# Patient Record
Sex: MALE | Race: WHITE | NOT HISPANIC OR LATINO | Employment: OTHER | URBAN - METROPOLITAN AREA
[De-identification: names, ages, dates, MRNs, and addresses within clinical notes are randomized per-mention and may not be internally consistent; named-entity substitution may affect disease eponyms.]

---

## 2017-01-17 ENCOUNTER — OFFICE VISIT (OUTPATIENT)
Dept: CARDIOLOGY | Facility: MEDICAL CENTER | Age: 67
End: 2017-01-17
Payer: COMMERCIAL

## 2017-01-17 VITALS
SYSTOLIC BLOOD PRESSURE: 120 MMHG | WEIGHT: 278 LBS | HEART RATE: 58 BPM | OXYGEN SATURATION: 96 % | HEIGHT: 73 IN | DIASTOLIC BLOOD PRESSURE: 68 MMHG | BODY MASS INDEX: 36.84 KG/M2

## 2017-01-17 DIAGNOSIS — I48.0 PAROXYSMAL ATRIAL FIBRILLATION (HCC): ICD-10-CM

## 2017-01-17 DIAGNOSIS — I11.9 BENIGN HYPERTENSIVE HEART DISEASE WITHOUT HEART FAILURE: ICD-10-CM

## 2017-01-17 DIAGNOSIS — Z79.01 CHRONIC ANTICOAGULATION: ICD-10-CM

## 2017-01-17 PROCEDURE — 99213 OFFICE O/P EST LOW 20 MIN: CPT | Performed by: INTERNAL MEDICINE

## 2017-01-17 ASSESSMENT — ENCOUNTER SYMPTOMS
WHEEZING: 0
PND: 0
BRUISES/BLEEDS EASILY: 0
COUGH: 0
MYALGIAS: 0
FALLS: 0
ORTHOPNEA: 0

## 2017-01-17 NOTE — MR AVS SNAPSHOT
"Warren Templeton   2017 8:40 AM   Office Visit   MRN: [id number]    Department:  [facility]   Dept Phone:  [phone]    Description:  Male : 1950   Provider:  Sonu Rodriguez M.D.           Allergies as of 2017     No Known Allergies      You were diagnosed with     Paroxysmal atrial fibrillation (CMS-[facility])   [493605]       Chronic anticoagulation   [610342]       Benign hypertensive heart disease without heart failure   [402.10.ICD-9-CM]         Vital Signs     Blood Pressure Pulse Height Weight Body Mass Index Oxygen Saturation    120/68 mmHg 58 1.854 m (6' 1\") 126.1 kg (278 lb) 36.69 kg/m2 96%    Smoking Status                   Never Smoker            Basic Information     Date Of Birth Sex Race Ethnicity Preferred Language    1950 Male White Non- English      Your appointments     Mar 29, 2017  2:40 PM   FOLLOW UP with Sonu Reddy M.D.   [facility] (--)    [address] [facility] [address]  [facility] [address] [phone]              Problem List              ICD-10-CM Priority Class Noted - Resolved    Benign hypertensive heart disease without heart failure I11.9   2007 - Present    Diastolic dysfunction I51.9   2012 - Present    Hypercholesterolemia E78.00   2007 - Present    MR (mitral regurgitation) I34.0   2012 - Present    Osteoarthritis M19.90   2012 - Present    RLS (restless legs syndrome) G25.81   2012 - Present    Other chest pain R07.89   2007 - Present    Atrial fibrillation (CMS-[facility]) I48.91   2016 - Present    Sleep apnea G47.30   2016 - Present    High risk medication use Z79.899   2016 - Present    Chronic anticoagulation Z79.01   2016 - Present      Health Maintenance        Date Due Completion Dates    IMM DTaP/Tdap/Td Vaccine (1 - Tdap) 3/1/1971 ---    COLONOSCOPY 3/1/2002 ---    IMM ZOSTER VACCINE 3/1/2012 ---    IMM INFLUENZA (1) 2016 ---         "   Current Immunizations     Pneumococcal Vaccine (UF)Historical Data 10/20/2015      Below and/or attached are the medications your provider expects you to take. Review all of your home medications and newly ordered medications with your provider and/or pharmacist. Follow medication instructions as directed by your provider and/or pharmacist. Please keep your medication list with you and share with your provider. Update the information when medications are discontinued, doses are changed, or new medications (including over-the-counter products) are added; and carry medication information at all times in the event of emergency situations     Allergies:  No Known Allergies          Medications  Valid as of: January 17, 2017 -  9:11 AM    Generic Name Brand Name Tablet Size Instructions for use    AmLODIPine Besylate (Tab) NORVASC 10 MG Take 10 mg by mouth every day.        Amoxicillin (Cap) AMOXIL 500 MG Take 2,000 mg by mouth Once. Prior to dental work.  Per pt, did not end up getting dental work, has to wait.        Apixaban (Tab) ELIQUIS 5mg Take 1 Tab by mouth 2 Times a Day.        Ascorbic Acid (Tab) ascorbic acid 500 MG Take 500 mg by mouth 2 Times a Day.        Baclofen (Tab) LIORESAL 10 MG Take 10 mg by mouth 4 times a day as needed (pain).        Calcium Citrate   Take 2 Tabs by mouth 2 Times a Day.        Cyanocobalamin (Tab) VITAMIN B-12 500 MCG Take 500 mcg by mouth every day.        Metoprolol Succinate (TABLET SR 24 HR) TOPROL  MG Take 1 Tab by mouth every day.        Naproxen (Tab) NAPROSYN 500 MG Take 500 mg by mouth 2 times a day, with meals.        Omega-3 Fatty Acids (Cap) fish oil 1000 MG Take 1,000 mg by mouth 2 Times a Day.        Omeprazole (CAPSULE DELAYED RELEASE) PRILOSEC 20 MG Take 20 mg by mouth 2 times a day as needed (with naproxen or prn).        Prenatal Vit-Fe Fumarate-FA   Take 1 tablet by mouth 2 Times a Day.        TraMADol HCl (Tab) ULTRAM 50 MG Take  mg by mouth every  four hours as needed for Severe Pain.        Valsartan (Tab) DIOVAN 160 MG Take 160 mg by mouth 2 times a day.        .                 Medicines prescribed today were sent to:     [facility]/PH[facility]Y #[id number] [place], [place] - [address] [address]    Phone: [phone] Fax: [phone]    Open 24 Hours?: No      Medication refill instructions:       If your prescription bottle indicates you have medication refills left, it is not necessary to call your provider’s office. Please contact your pharmacy and they will refill your medication.    If your prescription bottle indicates you do not have any refills left, you may request refills at any time through one of the following ways: The online [employer] system (except Urgent Care), by calling your provider’s office, or by asking your pharmacy to contact your provider’s office with a refill request. Medication refills are processed only during regular business hours and may not be available until the next business day. Your provider may request additional information or to have a follow-up visit with you prior to refilling your medication.   *Please Note: Medication refills are assigned a new Rx number when refilled electronically. Your pharmacy may indicate that no refills were authorized even though a new prescription for the same medication is available at the pharmacy. Please request the medicine by name with the pharmacy before contacting your provider for a refill.        Your To Do List     Future Labs/Procedures Complete By Expires    HOLTER MONITOR STUDY  2/1/2017 1/17/2018         [employer] Status: Patient Declined

## 2017-01-17 NOTE — Clinical Note
[facility]   [address]  [place] [address]  Phone: [phone]  Fax: [phone]              Warren Templeton  1950    Encounter Date: 1/17/2017    Sonu Rodriguez M.D.          PROGRESS NOTE:  Subjective:   Warren Templeton is a 66 y.o. male who presents today for follow-up of his hypertensive cardiovascular disease and arrhythmia. He's been doing very well since his last visit with Dr. Reddy. He is due to set up the Holter.    Past Medical History   Diagnosis Date   • Benign hypertensive heart disease without heart failure      Disabling from his career as a , occupation related.    • Pure hypercholesterolemia      [facility]   • [facility] pain, unspecified      [facility]   • Diastolic dysfunction    • MR (mitral regurgitation)    • Osteoarthritis    • RLS (restless legs syndrome)      Past Surgical History   Procedure Laterality Date   • Other orthopedic surgery  2007 note;     Bilateral, with musculoskelatal limitations   • Gastric bypass laparoscopic       With Daron-en-Y     Family History   Problem Relation Age of Onset   • Hypertension Mother    • Cancer Father      Esophageal   • Cancer Brother      Brain   • Hypertension Brother      History   Smoking status   • Never Smoker    Smokeless tobacco   • Never Used     No Known Allergies  Outpatient Encounter Prescriptions as of 1/17/2017   Medication Sig Dispense Refill   • metoprolol SR (TOPROL XL) 100 MG TABLET SR 24 HR Take 1 Tab by mouth every day. 30 Tab 5   • baclofen (LIORESAL) 10 MG Tab Take 10 mg by mouth 4 times a day as needed (pain).     • amoxicillin (AMOXIL) 500 MG Cap Take 2,000 mg by mouth Once. Prior to dental work.  Per pt, did not end up getting dental work, has to wait.     • apixaban (ELIQUIS) 5mg Tab Take 1 Tab by mouth 2 Times a Day. 42 Tab 0   • Prenatal Vit-Fe Fumarate-FA (RE PRENATAL MULTIVITAMIN/IRON  "PO) Take 1 tablet by mouth 2 Times a Day.     • Omega-3 Fatty Acids (FISH OIL) 1000 MG Cap capsule Take 1,000 mg by mouth 2 Times a Day.     • cyanocobalamin (VITAMIN B-12) 500 MCG TABS Take 500 mcg by mouth every day.     • omeprazole (PRILOSEC) 20 MG CPDR Take 20 mg by mouth 2 times a day as needed (with naproxen or prn).     • naproxen (NAPROSYN) 500 MG TABS Take 500 mg by mouth 2 times a day, with meals.     • tramadol (ULTRAM) 50 MG TABS Take  mg by mouth every four hours as needed for Severe Pain.     • ascorbic acid (ASCORBIC ACID) 500 MG TABS Take 500 mg by mouth 2 Times a Day.     • Calcium Citrate (CITRACAL PO) Take 2 Tabs by mouth 2 Times a Day.     • valsartan (DIOVAN) 160 MG TABS Take 160 mg by mouth 2 times a day.     • amlodipine (NORVASC) 10 MG TABS Take 10 mg by mouth every day.       No facility-administered encounter medications on file as of 1/17/2017.     Review of Systems   HENT: Negative for nosebleeds.    Respiratory: Negative for cough and wheezing.    Cardiovascular: Negative for orthopnea and PND.   Musculoskeletal: Negative for myalgias and falls.   Endo/Heme/Allergies: Does not bruise/bleed easily.        Objective:   /68 mmHg  Pulse 58  Ht 1.854 m (6' 1\")  Wt 126.1 kg (278 lb)  BMI 36.69 kg/m2  SpO2 96%    Physical Exam   Constitutional: He is oriented to person, place, and time. He appears well-developed and well-nourished.   Eyes: No scleral icterus.   Neck: No JVD present.   Cardiovascular: Normal rate, regular rhythm, normal heart sounds and intact distal pulses.  Exam reveals no gallop.    No murmur heard.  Pulmonary/Chest: Effort normal and breath sounds normal.   Musculoskeletal: He exhibits no edema.   Neurological: He is alert and oriented to person, place, and time.   Psychiatric: He has a normal mood and affect. Thought content normal.       Assessment:     1. Paroxysmal atrial fibrillation (CMS-[facility])  HOLTER MONITOR STUDY   2. Chronic anticoagulation     3. " Benign hypertensive heart disease without heart failure       The above assessed cardiovascular problems are clinically stable.  Medical Decision Making:  Today's Assessment / Status / Plan:     Obtain the Holter before his next visit with Dr. Reddy. Follow with me after that visit. [facility] and [facility]'ll [facility] us copies.  Continued primary follow-up with Dr. Argueta.      No Recipients

## 2017-01-17 NOTE — PROGRESS NOTES
Subjective:   Warren Templeton is a 66 y.o. male who presents today for follow-up of his hypertensive cardiovascular disease and arrhythmia. He's been doing very well since his last visit with Dr. Reddy. He is due to set up the Holter.    Past Medical History   Diagnosis Date   • Benign hypertensive heart disease without heart failure      Disabling from his career as a , occupation related.    • Pure hypercholesterolemia      [facility]   • Chest pain, unspecified      ~2000 with negative cardiac cath by [facility]   • Diastolic dysfunction    • MR (mitral regurgitation)    • Osteoarthritis    • RLS (restless legs syndrome)      Past Surgical History   Procedure Laterality Date   • Other orthopedic surgery  2007 note;     Bilateral, with musculoskelatal limitations   • Gastric bypass laparoscopic       With Daron-en-Y     Family History   Problem Relation Age of Onset   • Hypertension Mother    • Cancer Father      Esophageal   • Cancer Brother      Brain   • Hypertension Brother      History   Smoking status   • Never Smoker    Smokeless tobacco   • Never Used     No Known Allergies  Outpatient Encounter Prescriptions as of 1/17/2017   Medication Sig Dispense Refill   • metoprolol SR (TOPROL XL) 100 MG TABLET SR 24 HR Take 1 Tab by mouth every day. 30 Tab 5   • baclofen (LIORESAL) 10 MG Tab Take 10 mg by mouth 4 times a day as needed (pain).     • amoxicillin (AMOXIL) 500 MG Cap Take 2,000 mg by mouth Once. Prior to dental work.  Per pt, did not end up getting dental work, has to wait.     • apixaban (ELIQUIS) 5mg Tab Take 1 Tab by mouth 2 Times a Day. 42 Tab 0   • Prenatal Vit-Fe Fumarate-FA (RE PRENATAL MULTIVITAMIN/IRON PO) Take 1 tablet by mouth 2 Times a Day.     • Omega-3 Fatty Acids (FISH OIL) 1000 MG Cap capsule Take 1,000 mg by mouth 2 Times a Day.     • cyanocobalamin (VITAMIN B-12) 500 MCG TABS Take 500 mcg by mouth every day.     • omeprazole (PRILOSEC) 20 MG CPDR Take 20 mg by mouth 2  "times a day as needed (with naproxen or prn).     • naproxen (NAPROSYN) 500 MG TABS Take 500 mg by mouth 2 times a day, with meals.     • tramadol (ULTRAM) 50 MG TABS Take  mg by mouth every four hours as needed for Severe Pain.     • ascorbic acid (ASCORBIC ACID) 500 MG TABS Take 500 mg by mouth 2 Times a Day.     • Calcium Citrate (CITRACAL PO) Take 2 Tabs by mouth 2 Times a Day.     • valsartan (DIOVAN) 160 MG TABS Take 160 mg by mouth 2 times a day.     • amlodipine (NORVASC) 10 MG TABS Take 10 mg by mouth every day.       No facility-administered encounter medications on file as of 1/17/2017.     Review of Systems   HENT: Negative for nosebleeds.    Respiratory: Negative for cough and wheezing.    Cardiovascular: Negative for orthopnea and PND.   Musculoskeletal: Negative for myalgias and falls.   Endo/Heme/Allergies: Does not bruise/bleed easily.        Objective:   /68 mmHg  Pulse 58  Ht 1.854 m (6' 1\")  Wt 126.1 kg (278 lb)  BMI 36.69 kg/m2  SpO2 96%    Physical Exam   Constitutional: He is oriented to person, place, and time. He appears well-developed and well-nourished.   Eyes: No scleral icterus.   Neck: No JVD present.   Cardiovascular: Normal rate, regular rhythm, normal heart sounds and intact distal pulses.  Exam reveals no gallop.    No murmur heard.  Pulmonary/Chest: Effort normal and breath sounds normal.   Musculoskeletal: He exhibits no edema.   Neurological: He is alert and oriented to person, place, and time.   Psychiatric: He has a normal mood and affect. Thought content normal.       Assessment:     1. Paroxysmal atrial fibrillation (CMS-[facility])  HOLTER MONITOR STUDY   2. Chronic anticoagulation     3. Benign hypertensive heart disease without heart failure       The above assessed cardiovascular problems are clinically stable.  Medical Decision Making:  Today's Assessment / Status / Plan:     Obtain the Holter before his next visit with Dr. Reddy. Follow with me after that " visit. [facility]'ll [facility] us copies.  Continued primary follow-up with Dr. Argueta.

## 2017-02-27 DIAGNOSIS — I48.0 PAF (PAROXYSMAL ATRIAL FIBRILLATION) (HCC): ICD-10-CM

## 2017-02-28 DIAGNOSIS — I48.0 PAROXYSMAL ATRIAL FIBRILLATION (HCC): ICD-10-CM

## 2017-02-28 RX ORDER — APIXABAN 5 MG/1
TABLET, FILM COATED ORAL
Qty: 60 TAB | Refills: 11 | Status: SHIPPED | OUTPATIENT
Start: 2017-02-28 | End: 2017-03-28

## 2017-03-28 DIAGNOSIS — I10 ESSENTIAL HYPERTENSION: ICD-10-CM

## 2017-03-28 DIAGNOSIS — I48.0 PAF (PAROXYSMAL ATRIAL FIBRILLATION) (HCC): ICD-10-CM

## 2017-03-28 RX ORDER — METOPROLOL SUCCINATE 100 MG/1
100 TABLET, EXTENDED RELEASE ORAL DAILY
Qty: 30 TAB | Refills: 11 | Status: CANCELLED | OUTPATIENT
Start: 2017-03-28

## 2017-03-29 ENCOUNTER — OFFICE VISIT (OUTPATIENT)
Dept: CARDIOLOGY | Facility: MEDICAL CENTER | Age: 67
End: 2017-03-29
Payer: COMMERCIAL

## 2017-03-29 VITALS
BODY MASS INDEX: 36.18 KG/M2 | HEART RATE: 63 BPM | WEIGHT: 273 LBS | DIASTOLIC BLOOD PRESSURE: 70 MMHG | OXYGEN SATURATION: 94 % | HEIGHT: 73 IN | SYSTOLIC BLOOD PRESSURE: 124 MMHG

## 2017-03-29 DIAGNOSIS — I48.0 PAF (PAROXYSMAL ATRIAL FIBRILLATION) (HCC): ICD-10-CM

## 2017-03-29 LAB — EKG IMPRESSION: NORMAL

## 2017-03-29 PROCEDURE — 99214 OFFICE O/P EST MOD 30 MIN: CPT | Performed by: INTERNAL MEDICINE

## 2017-03-29 PROCEDURE — 93000 ELECTROCARDIOGRAM COMPLETE: CPT | Performed by: INTERNAL MEDICINE

## 2017-03-29 RX ORDER — POTASSIUM CHLORIDE 750 MG/1
TABLET, EXTENDED RELEASE ORAL
Refills: 11 | COMMUNITY
Start: 2017-03-01 | End: 2017-12-27 | Stop reason: SDUPTHER

## 2017-03-29 RX ORDER — PREDNISONE 20 MG/1
TABLET ORAL
Refills: 0 | COMMUNITY
Start: 2017-03-13 | End: 2017-12-27

## 2017-03-29 RX ORDER — PREDNISONE 10 MG/1
TABLET ORAL
Refills: 0 | COMMUNITY
Start: 2017-03-02 | End: 2017-12-27

## 2017-03-29 RX ORDER — DOFETILIDE 0.5 MG/1
500 CAPSULE ORAL EVERY 12 HOURS
Refills: 3 | COMMUNITY
Start: 2017-01-09 | End: 2017-12-12

## 2017-03-29 ASSESSMENT — ENCOUNTER SYMPTOMS: PALPITATIONS: 0

## 2017-03-29 NOTE — PROGRESS NOTES
Subjective:   Warren Templeton is a 67 y.o. male who presents today for follow up of a fib on rate control    He stopped potassium but now getting leg cramps.  He is not feeling any a fib.  Feeling pretty good.  Cleaning the lot next to his.  Dealing with poison oak but other than that he is ok    Past Medical History   Diagnosis Date   • Benign hypertensive heart disease without heart failure      Disabling from his career as a , occupation related.    • Pure hypercholesterolemia      [facility]   • Chest pain, unspecified      ~[facility]   • Diastolic dysfunction    • MR (mitral regurgitation)    • Osteoarthritis    • RLS (restless legs syndrome)      Past Surgical History   Procedure Laterality Date   • Other orthopedic surgery  2007 note;     Bilateral, with musculoskelatal limitations   • Gastric bypass laparoscopic       With Daron-en-Y     Family History   Problem Relation Age of Onset   • Hypertension Mother    • Cancer Father      Esophageal   • Cancer Brother      Brain   • Hypertension Brother      History   Smoking status   • Never Smoker    Smokeless tobacco   • Never Used     No Known Allergies  Outpatient Encounter Prescriptions as of 3/29/2017   Medication Sig Dispense Refill   • dofetilide (TIKOSYN) 500 MCG Cap Take 500 mcg by mouth every 12 hours.  3   • apixaban (ELIQUIS) 5mg Tab Take 1 Tab by mouth 2 Times a Day. 60 Tab 11   • [DISCONTINUED] metoprolol SR (TOPROL XL) 100 MG TABLET SR 24 HR Take 1 Tab by mouth every day. 30 Tab 5   • baclofen (LIORESAL) 10 MG Tab Take 10 mg by mouth 4 times a day as needed (pain).     • amoxicillin (AMOXIL) 500 MG Cap Take 2,000 mg by mouth Once. Prior to dental work.  Per pt, did not end up getting dental work, has to wait.     • Prenatal Vit-Fe Fumarate-FA (RE PRENATAL MULTIVITAMIN/IRON PO) Take 1 tablet by mouth 2 Times a Day.     • Omega-3 Fatty Acids (FISH OIL) 1000 MG Cap capsule Take 1,000 mg by mouth 2  "Times a Day.     • cyanocobalamin (VITAMIN B-12) 500 MCG TABS Take 500 mcg by mouth every day.     • omeprazole (PRILOSEC) 20 MG CPDR Take 20 mg by mouth 2 times a day as needed (with naproxen or prn).     • naproxen (NAPROSYN) 500 MG TABS Take 500 mg by mouth 2 times a day, with meals.     • tramadol (ULTRAM) 50 MG TABS Take  mg by mouth every four hours as needed for Severe Pain.     • ascorbic acid (ASCORBIC ACID) 500 MG TABS Take 500 mg by mouth 2 Times a Day.     • Calcium Citrate (CITRACAL PO) Take 2 Tabs by mouth 2 Times a Day.     • valsartan (DIOVAN) 160 MG TABS Take 160 mg by mouth 2 times a day.     • amlodipine (NORVASC) 10 MG TABS Take 10 mg by mouth every day.     • potassium chloride SA (K-DUR) 10 MEQ Tab CR TAKE 1 TABLET BY MOUTH TWICE A DAY AS DIRECTED  11   • predniSONE (DELTASONE) 20 MG Tab TAKE 2 TABS DAILY X3 DAYS THEN 1 TAB X3 DAYS  0   • predniSONE (DELTASONE) 10 MG Tab TAKE 5 TABLETS DAILY X2 DAYS, THEN DECREASE BY 1 TABLET EVERY 2 DAYS AS DIRECTED (CHECK INS)  0     No facility-administered encounter medications on file as of 3/29/2017.     Review of Systems   Cardiovascular: Negative for palpitations.        Objective:   /70 mmHg  Pulse 63  Ht 1.854 m (6' 0.99\")  Wt 123.832 kg (273 lb)  BMI 36.03 kg/m2  SpO2 94%    Physical Exam   Constitutional: He is oriented to person, place, and time. He appears well-developed and well-nourished. No distress.   HENT:   Head: Normocephalic and atraumatic.   Right Ear: External ear normal.   Left Ear: External ear normal.   Nose: Nose normal.   Mouth/Throat: Oropharynx is clear and moist.   Eyes: Conjunctivae and EOM are normal. Pupils are equal, round, and reactive to light. Right eye exhibits no discharge. Left eye exhibits no discharge. No scleral icterus.   Neck: Normal range of motion. Neck supple. No JVD present. No tracheal deviation present.   Cardiovascular: Normal rate, regular rhythm, normal heart sounds and intact distal " pulses.  Exam reveals no gallop and no friction rub.    No murmur heard.  Pulmonary/Chest: Effort normal and breath sounds normal. No stridor. No respiratory distress. He has no wheezes. He has no rales. He exhibits no tenderness.   Abdominal: Soft. He exhibits no distension. There is no tenderness.   Musculoskeletal: He exhibits no edema or tenderness.   Neurological: He is alert and oriented to person, place, and time. No cranial nerve deficit. Coordination normal.   Skin: Skin is warm and dry. No rash noted. He is not diaphoretic. No erythema. No pallor.   Psychiatric: He has a normal mood and affect. His behavior is normal. Judgment and thought content normal.   Vitals reviewed.      Assessment:     1. PAF (paroxysmal atrial fibrillation) (CMS-[facility])  EKG       Medical Decision Making:  Today's Assessment / Status / Plan:     Doing very well on tikosyn and oac.  Cr 1.1 and qtc 414

## 2017-03-29 NOTE — MR AVS SNAPSHOT
"        Warren Castrohilario   3/29/2017 2:40 PM   Office Visit   MRN: [id number]    Department:  [facility]   Dept Phone:  [phone]    Description:  Male : 1950   Provider:  Sonu Reddy M.D.           Reason for Visit     Follow-Up           Allergies as of 3/29/2017     No Known Allergies      You were diagnosed with     PAF (paroxysmal atrial fibrillation) (CMS-HCC)   [033271]         Vital Signs     Blood Pressure Pulse Height Weight Body Mass Index Oxygen Saturation    124/70 mmHg 63 1.854 m (6' 0.99\") 123.832 kg (273 lb) 36.03 kg/m2 94%    Smoking Status                   Never Smoker            Basic Information     Date Of Birth Sex Race Ethnicity Preferred Language    1950 Male White Non- English      Problem List              ICD-10-CM Priority Class Noted - Resolved    Benign hypertensive heart disease without heart failure I11.9   2007 - Present    Diastolic dysfunction I51.9   2012 - Present    Hypercholesterolemia E78.00   2007 - Present    MR (mitral regurgitation) I34.0   2012 - Present    Osteoarthritis M19.90   2012 - Present    RLS (restless legs syndrome) G25.81   2012 - Present    Other chest pain R07.89   2007 - Present    Sleep apnea G47.30   2016 - Present    High risk medication use Z79.899   2016 - Present    Chronic anticoagulation Z79.01   2016 - Present    Paroxysmal atrial fibrillation (CMS-HCC) I48.0   2017 - Present      Health Maintenance        Date Due Completion Dates    IMM DTaP/Tdap/Td Vaccine (1 - Tdap) 3/1/1971 ---    COLONOSCOPY 3/1/2002 ---    IMM ZOSTER VACCINE 3/1/2012 ---    IMM INFLUENZA (1) 2016 ---    IMM PNEUMOCOCCAL 65+ (ADULT) LOW/MEDIUM RISK SERIES (1 of 2 - PCV13) 3/1/2017 ---            Results       Current Immunizations     Pneumococcal Vaccine (UF)Historical Data 10/20/2015      Below and/or attached are the medications your provider expects you to take. Review all of your home " medications and newly ordered medications with your provider and/or pharmacist. Follow medication instructions as directed by your provider and/or pharmacist. Please keep your medication list with you and share with your provider. Update the information when medications are discontinued, doses are changed, or new medications (including over-the-counter products) are added; and carry medication information at all times in the event of emergency situations     Allergies:  No Known Allergies          Medications  Valid as of: March 29, 2017 -  3:15 PM    Generic Name Brand Name Tablet Size Instructions for use    AmLODIPine Besylate (Tab) NORVASC 10 MG Take 10 mg by mouth every day.        Amoxicillin (Cap) AMOXIL 500 MG Take 2,000 mg by mouth Once. Prior to dental work.  Per pt, did not end up getting dental work, has to wait.        Apixaban (Tab) ELIQUIS 5mg Take 1 Tab by mouth 2 Times a Day.        Ascorbic Acid (Tab) ascorbic acid 500 MG Take 500 mg by mouth 2 Times a Day.        Baclofen (Tab) LIORESAL 10 MG Take 10 mg by mouth 4 times a day as needed (pain).        Calcium Citrate   Take 2 Tabs by mouth 2 Times a Day.        Cyanocobalamin (Tab) VITAMIN B-12 500 MCG Take 500 mcg by mouth every day.        Dofetilide (Cap) TIKOSYN 500 MCG Take 500 mcg by mouth every 12 hours.        Metoprolol Succinate (TABLET SR 24 HR) TOPROL  MG Take 1 Tab by mouth every day.        Naproxen (Tab) NAPROSYN 500 MG Take 500 mg by mouth 2 times a day, with meals.        Omega-3 Fatty Acids (Cap) fish oil 1000 MG Take 1,000 mg by mouth 2 Times a Day.        Omeprazole (CAPSULE DELAYED RELEASE) PRILOSEC 20 MG Take 20 mg by mouth 2 times a day as needed (with naproxen or prn).        Potassium Chloride Stephanie CR (Tab CR) K-DUR 10 MEQ TAKE 1 TABLET BY MOUTH TWICE A DAY AS DIRECTED        PredniSONE (Tab) DELTASONE 20 MG TAKE 2 TABS DAILY X3 DAYS THEN 1 TAB X3 DAYS        PredniSONE (Tab) DELTASONE 10 MG TAKE 5 TABLETS DAILY X2  DAYS, THEN DECREASE BY 1 TABLET EVERY 2 DAYS AS DIRECTED (CHECK INS)        Prenatal Vit-Fe Fumarate-FA   Take 1 tablet by mouth 2 Times a Day.        TraMADol HCl (Tab) ULTRAM 50 MG Take  mg by mouth every four hours as needed for Severe Pain.        Valsartan (Tab) DIOVAN 160 MG Take 160 mg by mouth 2 times a day.        .                 Medicines prescribed today were sent to:     [id number] [place], [place] - [address] [address]    Phone: [phone] Fax: [phone]    Open 24 Hours?: No      Medication refill instructions:       If your prescription bottle indicates you have medication refills left, it is not necessary to call your provider’s office. Please contact your pharmacy and they will refill your medication.    If your prescription bottle indicates you do not have any refills left, you may request refills at any time through one of the following ways: The online [employer] system (except Urgent Care), by calling your provider’s office, or by asking your pharmacy to contact your provider’s office with a refill request. Medication refills are processed only during regular business hours and may not be available until the next business day. Your provider may request additional information or to have a follow-up visit with you prior to refilling your medication.   *Please Note: Medication refills are assigned a new Rx number when refilled electronically. Your pharmacy may indicate that no refills were authorized even though a new prescription for the same medication is available at the pharmacy. Please request the medicine by name with the pharmacy before contacting your provider for a refill.           MyChart Status: Patient Declined

## 2017-04-02 DIAGNOSIS — I10 ESSENTIAL HYPERTENSION: ICD-10-CM

## 2017-04-02 RX ORDER — METOPROLOL SUCCINATE 100 MG/1
100 TABLET, EXTENDED RELEASE ORAL DAILY
Qty: 30 TAB | Refills: 11 | Status: ON HOLD | OUTPATIENT
Start: 2017-04-02 | End: 2018-01-17

## 2017-10-05 ENCOUNTER — TELEPHONE (OUTPATIENT)
Dept: CARDIOLOGY | Facility: MEDICAL CENTER | Age: 67
End: 2017-10-05

## 2017-10-05 NOTE — TELEPHONE ENCOUNTER
----- Message from Cate Coates sent at 10/5/2017  2:10 PM PDT -----  Regarding: Question about donating blood  JOSE/Ludy    Patient wants to find out if it's okay for him to donate blood and can be reached at [phone].

## 2017-12-11 ENCOUNTER — TELEPHONE (OUTPATIENT)
Dept: CARDIOLOGY | Facility: MEDICAL CENTER | Age: 67
End: 2017-12-11

## 2017-12-11 NOTE — TELEPHONE ENCOUNTER
Lives in [place]. Much more fatigue x 2 weeks. HR is irregular, doesn't know rate. Taking Eliquis as prescribed. Pt. has [facility] w/ Dr. Rodriguez 12/18. Can't come sooner. Wife having surgery this week in [place]. On metoprolol. Tikosyn on med list. Pt states no, Dr. Reddy stopped it at last visit. Right now, he'll monitor HR. Triggers discussed. If he has sustained fast HR & he becomes lightheaded, near syncopal, etc., he'll use ER. Will keep [facility] next Mon. with Dr. Rodriguez. Forwarded to Dr. Rodriguez & Dr. Reddy

## 2017-12-11 NOTE — TELEPHONE ENCOUNTER
----- Message from Breana Ward, Med Ass't sent at 12/11/2017 11:54 AM PST -----  Regarding: Symptoms  Contact: [phone]  JOSE Elkins,    Pt says he has been feeling fatigued. He says he thinks he may be possibly in Afib.    He would like a call back at: [phone].    Thank you so much,    M

## 2017-12-12 NOTE — TELEPHONE ENCOUNTER
I called & offered him 12/27 - only day when both MD's in before Dr. Rodriguez retires. Pt wants to just keep [facility] w/ Dr. Rodriguez & arrange [facility] w/ Dr. Reddy after seen. He confirmed with his daughter who was at appt. that he was to stop Tikosyn. I updated MAR.

## 2017-12-12 NOTE — TELEPHONE ENCOUNTER
I did not know he was off Tikosyn.  I don't know if we can just start it again without repeating the wash in phase with hospital stay.  I am happy to see him and will ask Dr. Reddy.

## 2017-12-12 NOTE — TELEPHONE ENCOUNTER
I am off on 12/18.  Is there another day that week when we could both see him.  I did not think we stopped the tikosyn

## 2017-12-18 ENCOUNTER — OFFICE VISIT (OUTPATIENT)
Dept: CARDIOLOGY | Facility: MEDICAL CENTER | Age: 67
End: 2017-12-18
Payer: MEDICARE

## 2017-12-18 VITALS
WEIGHT: 285 LBS | BODY MASS INDEX: 37.77 KG/M2 | OXYGEN SATURATION: 94 % | HEIGHT: 73 IN | DIASTOLIC BLOOD PRESSURE: 80 MMHG | HEART RATE: 62 BPM | SYSTOLIC BLOOD PRESSURE: 130 MMHG

## 2017-12-18 DIAGNOSIS — Z79.899 HIGH RISK MEDICATION USE: ICD-10-CM

## 2017-12-18 DIAGNOSIS — E78.00 HYPERCHOLESTEROLEMIA: ICD-10-CM

## 2017-12-18 DIAGNOSIS — Z79.01 CHRONIC ANTICOAGULATION: ICD-10-CM

## 2017-12-18 DIAGNOSIS — I48.0 PAROXYSMAL ATRIAL FIBRILLATION (HCC): ICD-10-CM

## 2017-12-18 DIAGNOSIS — I11.9 BENIGN HYPERTENSIVE HEART DISEASE WITHOUT HEART FAILURE: ICD-10-CM

## 2017-12-18 PROCEDURE — 99213 OFFICE O/P EST LOW 20 MIN: CPT | Performed by: INTERNAL MEDICINE

## 2017-12-18 ASSESSMENT — ENCOUNTER SYMPTOMS
FALLS: 0
COUGH: 0
PND: 0
BRUISES/BLEEDS EASILY: 0
ORTHOPNEA: 0
WHEEZING: 0
MYALGIAS: 0

## 2017-12-18 ASSESSMENT — LIFESTYLE VARIABLES: SUBSTANCE_ABUSE: 0

## 2017-12-18 NOTE — LETTER
[facility]   [address]   [address]  [place] [address]  Phone: [phone]  Fax: [phone]              Warren Templeton  1950    Encounter Date: 12/18/2017    Sonu Rodriguez M.D.          PROGRESS NOTE:  Subjective:   Warren Templeton is a 67 y.o. male who presents today for follow-up of atrial fibrillation.  He's noticed an irregular rhythm for several weeks with increased easy fatigue. There was some confusion about his last visit in that Dr. Reddy thought he was to stay on the dofetilide but Mr. Templeton and his daughter were confident that he was supposed to come off and in fact he has been off it since that visit.  He's had no other cardiac symptoms.  Past Medical History:   Diagnosis Date   • Benign hypertensive heart disease without heart failure     Disabling from his career as a , occupation related.    • Chest pain, unspecified     ~[facility]   • Chronic anticoagulation 11/11/2016   • Diastolic dysfunction    • MR (mitral regurgitation)    • Osteoarthritis    • Paroxysmal atrial fibrillation (CMS-HCC) 2/28/2017   • Pure hypercholesterolemia     [facility]   • RLS (restless legs syndrome)      Past Surgical History:   Procedure Laterality Date   • GASTRIC BYPASS LAPAROSCOPIC      With Daron-en-Y   • OTHER ORTHOPEDIC SURGERY  2007 note;    Bilateral, with musculoskelatal limitations     Family History   Problem Relation Age of Onset   • Hypertension Mother    • Cancer Father      Esophageal   • Cancer Brother      Brain   • Hypertension Brother      History   Smoking Status   • Never Smoker   Smokeless Tobacco   • Never Used     No Known Allergies  Outpatient Encounter Prescriptions as of 12/18/2017   Medication Sig Dispense Refill   • metoprolol SR (TOPROL XL) 100 MG TABLET SR 24 HR Take 1 Tab by mouth every day. 30 Tab 11   • potassium chloride SA (K-DUR) 10 MEQ Tab CR  "TAKE 1 TABLET BY MOUTH TWICE A DAY AS DIRECTED  11   • apixaban (ELIQUIS) 5mg Tab Take 1 Tab by mouth 2 Times a Day. 60 Tab 11   • baclofen (LIORESAL) 10 MG Tab Take 10 mg by mouth 4 times a day as needed (pain).     • Prenatal Vit-Fe Fumarate-FA (RE PRENATAL MULTIVITAMIN/IRON PO) Take 1 tablet by mouth 2 Times a Day.     • Omega-3 Fatty Acids (FISH OIL) 1000 MG Cap capsule Take 1,000 mg by mouth 2 Times a Day.     • cyanocobalamin (VITAMIN B-12) 500 MCG TABS Take 500 mcg by mouth every day.     • omeprazole (PRILOSEC) 20 MG CPDR Take 20 mg by mouth 2 times a day as needed (with naproxen or prn).     • naproxen (NAPROSYN) 500 MG TABS Take 500 mg by mouth 2 times a day, with meals.     • tramadol (ULTRAM) 50 MG TABS Take  mg by mouth every four hours as needed for Severe Pain.     • ascorbic acid (ASCORBIC ACID) 500 MG TABS Take 500 mg by mouth 2 Times a Day.     • Calcium Citrate (CITRACAL PO) Take 2 Tabs by mouth 2 Times a Day.     • valsartan (DIOVAN) 160 MG TABS Take 160 mg by mouth 2 times a day.     • amlodipine (NORVASC) 10 MG TABS Take 10 mg by mouth every day.     • predniSONE (DELTASONE) 20 MG Tab TAKE 2 TABS DAILY X3 DAYS THEN 1 TAB X3 DAYS  0   • predniSONE (DELTASONE) 10 MG Tab TAKE 5 TABLETS DAILY X2 DAYS, THEN DECREASE BY 1 TABLET EVERY 2 DAYS AS DIRECTED (CHECK INS)  0   • amoxicillin (AMOXIL) 500 MG Cap Take 2,000 mg by mouth Once. Prior to dental work.  Per pt, did not end up getting dental work, has to wait.       No facility-administered encounter medications on file as of 12/18/2017.      Review of Systems   HENT: Negative for nosebleeds.    Respiratory: Negative for cough and wheezing.    Cardiovascular: Negative for orthopnea and PND.   Musculoskeletal: Negative for falls and myalgias.   Endo/Heme/Allergies: Does not bruise/bleed easily.   Psychiatric/Behavioral: Negative for substance abuse.        Objective:   /80   Pulse 62   Ht 1.854 m (6' 1\")   Wt (!) 129.3 kg (285 lb)   " SpO2 94%   BMI 37.60 kg/m²      Physical Exam   Constitutional: He is oriented to person, place, and time. He appears well-developed and well-nourished.   Eyes: No scleral icterus.   Neck: No JVD present.   Cardiovascular: Normal rate, S2 normal, normal heart sounds and intact distal pulses.  An irregularly irregular rhythm present. Exam reveals no gallop.    No murmur heard.  Normal variation of 1st sound   Pulmonary/Chest: Effort normal and breath sounds normal.   Musculoskeletal: He exhibits no edema.   Neurological: He is alert and oriented to person, place, and time.   Psychiatric: He has a normal mood and affect. Thought content normal.       Assessment:     1. Paroxysmal atrial fibrillation (CMS-HCC)  [facility] EPIPHANY EKG (Clinic Performed)    TSH   2. Chronic anticoagulation  CBC WITH DIFFERENTIAL   3. Benign hypertensive heart disease without heart failure  COMP METABOLIC PANEL   4. Hypercholesterolemia  LIPID PROFILE    COMP METABOLIC PANEL    TSH   5. High risk medication use  MAGNESIUM     EKG confirms that he is back in atrial fibrillation.  Heart rate and blood pressure well controlled.  Medical Decision Making:  Today's Assessment / Status / Plan:     Follow-up with Dr. Reddy and obtain lab prior to that visit. Decision then regarding reinduction of dofetilide or continuation with current anticoagulation and medical therapy strategy. The argument against that is that he is more fatigued in atrial fibrillation.      Sonu Argueta M.D.  [address] [place] [address]  VIA Facsimile: [phone]     Sonu Reddy M.D.  [address]  [address]  [facility] [address]  VIA In Basket

## 2017-12-19 NOTE — PROGRESS NOTES
Subjective:   Warren Templeton is a 67 y.o. male who presents today for follow-up of atrial fibrillation.  He's noticed an irregular rhythm for several weeks with increased easy fatigue. There was some confusion about his last visit in that Dr. Reddy thought he was to stay on the dofetilide but Mr. Templeton and his daughter were confident that he was supposed to come off and in fact he has been off it since that visit.  He's had no other cardiac symptoms.  Past Medical History:   Diagnosis Date   • Benign hypertensive heart disease without heart failure     Disabling from his career as a , occupation related.    • Chest pain, unspecified     ~2000 with negative cardiac cath by [facility]   • Chronic anticoagulation 11/11/2016   • Diastolic dysfunction    • MR (mitral regurgitation)    • Osteoarthritis    • Paroxysmal atrial fibrillation (CMS-HCC) 2/28/2017   • Pure hypercholesterolemia     [facility]   • RLS (restless legs syndrome)      Past Surgical History:   Procedure Laterality Date   • GASTRIC BYPASS LAPAROSCOPIC      With Daron-en-Y   • OTHER ORTHOPEDIC SURGERY  2007 note;    Bilateral, with musculoskelatal limitations     Family History   Problem Relation Age of Onset   • Hypertension Mother    • Cancer Father      Esophageal   • Cancer Brother      Brain   • Hypertension Brother      History   Smoking Status   • Never Smoker   Smokeless Tobacco   • Never Used     No Known Allergies  Outpatient Encounter Prescriptions as of 12/18/2017   Medication Sig Dispense Refill   • metoprolol SR (TOPROL XL) 100 MG TABLET SR 24 HR Take 1 Tab by mouth every day. 30 Tab 11   • potassium chloride SA (K-DUR) 10 MEQ Tab CR TAKE 1 TABLET BY MOUTH TWICE A DAY AS DIRECTED  11   • apixaban (ELIQUIS) 5mg Tab Take 1 Tab by mouth 2 Times a Day. 60 Tab 11   • baclofen (LIORESAL) 10 MG Tab Take 10 mg by mouth 4 times a day as needed (pain).     • Prenatal Vit-Fe Fumarate-FA (RE PRENATAL MULTIVITAMIN/IRON PO) Take 1  "tablet by mouth 2 Times a Day.     • Omega-3 Fatty Acids (FISH OIL) 1000 MG Cap capsule Take 1,000 mg by mouth 2 Times a Day.     • cyanocobalamin (VITAMIN B-12) 500 MCG TABS Take 500 mcg by mouth every day.     • omeprazole (PRILOSEC) 20 MG CPDR Take 20 mg by mouth 2 times a day as needed (with naproxen or prn).     • naproxen (NAPROSYN) 500 MG TABS Take 500 mg by mouth 2 times a day, with meals.     • tramadol (ULTRAM) 50 MG TABS Take  mg by mouth every four hours as needed for Severe Pain.     • ascorbic acid (ASCORBIC ACID) 500 MG TABS Take 500 mg by mouth 2 Times a Day.     • Calcium Citrate (CITRACAL PO) Take 2 Tabs by mouth 2 Times a Day.     • valsartan (DIOVAN) 160 MG TABS Take 160 mg by mouth 2 times a day.     • amlodipine (NORVASC) 10 MG TABS Take 10 mg by mouth every day.     • predniSONE (DELTASONE) 20 MG Tab TAKE 2 TABS DAILY X3 DAYS THEN 1 TAB X3 DAYS  0   • predniSONE (DELTASONE) 10 MG Tab TAKE 5 TABLETS DAILY X2 DAYS, THEN DECREASE BY 1 TABLET EVERY 2 DAYS AS DIRECTED (CHECK INS)  0   • amoxicillin (AMOXIL) 500 MG Cap Take 2,000 mg by mouth Once. Prior to dental work.  Per pt, did not end up getting dental work, has to wait.       No facility-administered encounter medications on file as of 12/18/2017.      Review of Systems   HENT: Negative for nosebleeds.    Respiratory: Negative for cough and wheezing.    Cardiovascular: Negative for orthopnea and PND.   Musculoskeletal: Negative for falls and myalgias.   Endo/Heme/Allergies: Does not bruise/bleed easily.   Psychiatric/Behavioral: Negative for substance abuse.        Objective:   /80   Pulse 62   Ht 1.854 m (6' 1\")   Wt (!) 129.3 kg (285 lb)   SpO2 94%   BMI 37.60 kg/m²     Physical Exam   Constitutional: He is oriented to person, place, and time. He appears well-developed and well-nourished.   Eyes: No scleral icterus.   Neck: No JVD present.   Cardiovascular: Normal rate, S2 normal, normal heart sounds and intact distal " pulses.  An irregularly irregular rhythm present. Exam reveals no gallop.    No murmur heard.  Normal variation of 1st sound   Pulmonary/Chest: Effort normal and breath sounds normal.   Musculoskeletal: He exhibits no edema.   Neurological: He is alert and oriented to person, place, and time.   Psychiatric: He has a normal mood and affect. Thought content normal.       Assessment:     1. Paroxysmal atrial fibrillation (CMS-HCC)  [facility] EPIPHANY EKG (Clinic Performed)    TSH   2. Chronic anticoagulation  CBC WITH DIFFERENTIAL   3. Benign hypertensive heart disease without heart failure  COMP METABOLIC PANEL   4. Hypercholesterolemia  LIPID PROFILE    COMP METABOLIC PANEL    TSH   5. High risk medication use  MAGNESIUM     EKG confirms that he is back in atrial fibrillation.  Heart rate and blood pressure well controlled.  Medical Decision Making:  Today's Assessment / Status / Plan:     Follow-up with Dr. Reddy and obtain lab prior to that visit. Decision then regarding reinduction of dofetilide or continuation with current anticoagulation and medical therapy strategy. The argument against that is that he is more fatigued in atrial fibrillation.

## 2017-12-20 NOTE — TELEPHONE ENCOUNTER
Spoke with pt, can move appt up from end of Jan if likes or can wait per DAVID, will need to be seen to discuss readmission for tikosyn. appt made 12/27 with NEIL HERNANDEZ to [facility]

## 2017-12-20 NOTE — TELEPHONE ENCOUNTER
I reviewed the previous notes from my visits.  Last decmeber, he noticed no difference in sinus from what he felt in a fib so it was stopped.  If nothing else is different and now notes that the a fib is symptomatic, would readmit to restart the tikosyn.

## 2017-12-27 ENCOUNTER — OFFICE VISIT (OUTPATIENT)
Dept: CARDIOLOGY | Facility: MEDICAL CENTER | Age: 67
End: 2017-12-27
Payer: COMMERCIAL

## 2017-12-27 ENCOUNTER — TELEPHONE (OUTPATIENT)
Dept: CARDIOLOGY | Facility: MEDICAL CENTER | Age: 67
End: 2017-12-27

## 2017-12-27 VITALS
OXYGEN SATURATION: 92 % | DIASTOLIC BLOOD PRESSURE: 84 MMHG | WEIGHT: 286 LBS | SYSTOLIC BLOOD PRESSURE: 124 MMHG | BODY MASS INDEX: 37.91 KG/M2 | HEIGHT: 73 IN | HEART RATE: 70 BPM

## 2017-12-27 DIAGNOSIS — I48.19 PERSISTENT ATRIAL FIBRILLATION (HCC): ICD-10-CM

## 2017-12-27 LAB — EKG IMPRESSION: NORMAL

## 2017-12-27 PROCEDURE — 93000 ELECTROCARDIOGRAM COMPLETE: CPT | Performed by: INTERNAL MEDICINE

## 2017-12-27 PROCEDURE — 99214 OFFICE O/P EST MOD 30 MIN: CPT | Performed by: INTERNAL MEDICINE

## 2017-12-27 RX ORDER — POTASSIUM CHLORIDE 750 MG/1
20 TABLET, EXTENDED RELEASE ORAL 2 TIMES DAILY
Qty: 90 TAB | Refills: 11 | Status: SHIPPED | OUTPATIENT
Start: 2017-12-27 | End: 2017-12-29 | Stop reason: SDUPTHER

## 2017-12-27 RX ORDER — FUROSEMIDE 20 MG/1
20 TABLET ORAL DAILY
Qty: 30 TAB | Refills: 0 | Status: ON HOLD | OUTPATIENT
Start: 2017-12-27 | End: 2018-01-17

## 2017-12-27 ASSESSMENT — ENCOUNTER SYMPTOMS
PALPITATIONS: 0
SHORTNESS OF BREATH: 1

## 2017-12-27 NOTE — PROGRESS NOTES
Subjective:   Warren Templeton is a 67 y.o. male who presents today for follow up of a fib.    About a month ago got a cold.  Energy was zapped.  Afterward found out he was back in a fib.  Was feeling good till then.      Past Medical History:   Diagnosis Date   • Benign hypertensive heart disease without heart failure     Disabling from his career as a , occupation related.    • Chest pain, unspecified     ~2000 with negative cardiac cath by [facility]   • Chronic anticoagulation 11/11/2016   • Diastolic dysfunction    • MR (mitral regurgitation)    • Osteoarthritis    • Paroxysmal atrial fibrillation (CMS-HCC) 2/28/2017   • Pure hypercholesterolemia     [facility]   • RLS (restless legs syndrome)      Past Surgical History:   Procedure Laterality Date   • GASTRIC BYPASS LAPAROSCOPIC      With Daron-en-Y   • OTHER ORTHOPEDIC SURGERY  2007 note;    Bilateral, with musculoskelatal limitations     Family History   Problem Relation Age of Onset   • Hypertension Mother    • Cancer Father      Esophageal   • Cancer Brother      Brain   • Hypertension Brother      History   Smoking Status   • Never Smoker   Smokeless Tobacco   • Never Used     No Known Allergies  Outpatient Encounter Prescriptions as of 12/27/2017   Medication Sig Dispense Refill   • metoprolol SR (TOPROL XL) 100 MG TABLET SR 24 HR Take 1 Tab by mouth every day. 30 Tab 11   • potassium chloride SA (K-DUR) 10 MEQ Tab CR TAKE 1 TABLET BY MOUTH TWICE A DAY AS DIRECTED  11   • apixaban (ELIQUIS) 5mg Tab Take 1 Tab by mouth 2 Times a Day. 60 Tab 11   • baclofen (LIORESAL) 10 MG Tab Take 10 mg by mouth 4 times a day as needed (pain).     • amoxicillin (AMOXIL) 500 MG Cap Take 2,000 mg by mouth Once. Prior to dental work.  Per pt, did not end up getting dental work, has to wait.     • Prenatal Vit-Fe Fumarate-FA (RE PRENATAL MULTIVITAMIN/IRON PO) Take 1 tablet by mouth 2 Times a Day.     • Omega-3 Fatty Acids (FISH OIL) 1000 MG Cap capsule Take  "1,000 mg by mouth 2 Times a Day.     • cyanocobalamin (VITAMIN B-12) 500 MCG TABS Take 500 mcg by mouth every day.     • omeprazole (PRILOSEC) 20 MG CPDR Take 20 mg by mouth 2 times a day as needed (with naproxen or prn).     • naproxen (NAPROSYN) 500 MG TABS Take 500 mg by mouth 2 times a day, with meals.     • tramadol (ULTRAM) 50 MG TABS Take  mg by mouth every four hours as needed for Severe Pain.     • ascorbic acid (ASCORBIC ACID) 500 MG TABS Take 500 mg by mouth 2 Times a Day.     • Calcium Citrate (CITRACAL PO) Take 2 Tabs by mouth 2 Times a Day.     • valsartan (DIOVAN) 160 MG TABS Take 160 mg by mouth 2 times a day.     • amlodipine (NORVASC) 10 MG TABS Take 10 mg by mouth every day.     • predniSONE (DELTASONE) 20 MG Tab TAKE 2 TABS DAILY X3 DAYS THEN 1 TAB X3 DAYS  0   • predniSONE (DELTASONE) 10 MG Tab TAKE 5 TABLETS DAILY X2 DAYS, THEN DECREASE BY 1 TABLET EVERY 2 DAYS AS DIRECTED (CHECK INS)  0     No facility-administered encounter medications on file as of 12/27/2017.      Review of Systems   Constitutional: Positive for malaise/fatigue.   Respiratory: Positive for shortness of breath.    Cardiovascular: Positive for leg swelling. Negative for palpitations.        Objective:   /84   Pulse 70   Ht 1.854 m (6' 1\")   Wt (!) 129.7 kg (286 lb)   SpO2 92%   BMI 37.73 kg/m²     Physical Exam   Constitutional: He is oriented to person, place, and time. He appears well-developed and well-nourished. No distress.   HENT:   Head: Normocephalic and atraumatic.   Right Ear: External ear normal.   Left Ear: External ear normal.   Nose: Nose normal.   Mouth/Throat: Oropharynx is clear and moist.   Eyes: Conjunctivae and EOM are normal. Pupils are equal, round, and reactive to light. Right eye exhibits no discharge. Left eye exhibits no discharge. No scleral icterus.   Neck: Normal range of motion. Neck supple. No JVD present. No tracheal deviation present.   Cardiovascular: Normal rate, normal " heart sounds and intact distal pulses.  An irregularly irregular rhythm present. Exam reveals no gallop and no friction rub.    No murmur heard.  Pulmonary/Chest: Effort normal and breath sounds normal. No stridor. No respiratory distress. He has no wheezes. He has no rales. He exhibits no tenderness.   Abdominal: Soft. He exhibits no distension. There is no tenderness.   Musculoskeletal: He exhibits edema. He exhibits no tenderness.   Neurological: He is alert and oriented to person, place, and time. No cranial nerve deficit. Coordination normal.   Skin: Skin is warm and dry. No rash noted. He is not diaphoretic. No erythema. No pallor.   Psychiatric: He has a normal mood and affect. His behavior is normal. Judgment and thought content normal.   Vitals reviewed.      Assessment:     1. Persistent atrial fibrillation (CMS-HCC)         Medical Decision Making:  Today's Assessment / Status / Plan:   He has symptoms.  Would like to get back on tikosyn.  Was well tolerated before.  With edema and being a bit winded, will try low dose lasix with some k

## 2017-12-28 ENCOUNTER — TELEPHONE (OUTPATIENT)
Dept: CARDIOLOGY | Facility: MEDICAL CENTER | Age: 67
End: 2017-12-28

## 2017-12-28 NOTE — TELEPHONE ENCOUNTER
Advised pt to take lasix and potassium until symptoms resolve or improve, and to call and let us know.

## 2017-12-28 NOTE — TELEPHONE ENCOUNTER
----- Message from Jackie Crespo sent at 12/28/2017  2:25 PM PST -----  Regarding: lasix clarification  Contact: [phone]  DAVID/payal  Pt calling for clarification of furosemide (LASIX) 20 MG instructions, needs to know how long he must take it.  Please call pt at [phone]

## 2017-12-29 ENCOUNTER — TELEPHONE (OUTPATIENT)
Dept: CARDIOLOGY | Facility: MEDICAL CENTER | Age: 67
End: 2017-12-29

## 2017-12-29 DIAGNOSIS — R60.0 LOCALIZED EDEMA: ICD-10-CM

## 2017-12-29 RX ORDER — POTASSIUM CHLORIDE 750 MG/1
20 TABLET, EXTENDED RELEASE ORAL 2 TIMES DAILY
Qty: 120 TAB | Refills: 11 | Status: SHIPPED | OUTPATIENT
Start: 2017-12-29 | End: 2018-05-09 | Stop reason: SDUPTHER

## 2017-12-30 NOTE — TELEPHONE ENCOUNTER
----- Message from Shanita Mcfarlane sent at 12/29/2017  4:02 PM PST -----  Regarding: pharmacy wants to increase quantity of potassium prescription  Contact: [phone]  DAVID/Lisbeth Motta @ [facility] is calling about the potassium prescription. He said the script is for 90 days, and since the patient is supposed to take 4 tabs daily, can they change the script to 120. He can be reached at [phone].

## 2018-01-16 ENCOUNTER — HOSPITAL ENCOUNTER (INPATIENT)
Facility: MEDICAL CENTER | Age: 68
LOS: 3 days | DRG: 310 | End: 2018-01-19
Attending: INTERNAL MEDICINE | Admitting: INTERNAL MEDICINE
Payer: COMMERCIAL

## 2018-01-16 PROCEDURE — 770020 HCHG ROOM/CARE - TELE (206)

## 2018-01-16 NOTE — PROGRESS NOTES
Patient is a direct admit from home.   Dr Reddy is admitting the patient.   Written orders faxed to floor and scanned into the media tab in the chart.   ADT signed and held, needs to be released when the patient arrives on the unit.   Patient will be arriving by private vehicle.

## 2018-01-17 ENCOUNTER — APPOINTMENT (OUTPATIENT)
Dept: RADIOLOGY | Facility: MEDICAL CENTER | Age: 68
DRG: 310 | End: 2018-01-17
Attending: NURSE PRACTITIONER
Payer: COMMERCIAL

## 2018-01-17 ENCOUNTER — HOSPITAL ENCOUNTER (OUTPATIENT)
Facility: MEDICAL CENTER | Age: 68
DRG: 310 | End: 2018-01-17
Payer: COMMERCIAL

## 2018-01-17 PROBLEM — I48.91 ATRIAL FIBRILLATION (HCC): Status: ACTIVE | Noted: 2018-01-17

## 2018-01-17 LAB
ALBUMIN SERPL BCP-MCNC: 4.3 G/DL (ref 3.2–4.9)
ALBUMIN/GLOB SERPL: 1.7 G/DL
ALP SERPL-CCNC: 47 U/L (ref 30–99)
ALT SERPL-CCNC: 17 U/L (ref 2–50)
ANION GAP SERPL CALC-SCNC: 8 MMOL/L (ref 0–11.9)
APTT PPP: 34.2 SEC (ref 24.7–36)
AST SERPL-CCNC: 22 U/L (ref 12–45)
BILIRUB SERPL-MCNC: 0.5 MG/DL (ref 0.1–1.5)
BUN SERPL-MCNC: 17 MG/DL (ref 8–22)
CALCIUM SERPL-MCNC: 9.5 MG/DL (ref 8.5–10.5)
CHLORIDE SERPL-SCNC: 104 MMOL/L (ref 96–112)
CO2 SERPL-SCNC: 25 MMOL/L (ref 20–33)
CREAT SERPL-MCNC: 1.05 MG/DL (ref 0.5–1.4)
EKG IMPRESSION: NORMAL
EKG IMPRESSION: NORMAL
ERYTHROCYTE [DISTWIDTH] IN BLOOD BY AUTOMATED COUNT: 45.3 FL (ref 35.9–50)
GLOBULIN SER CALC-MCNC: 2.5 G/DL (ref 1.9–3.5)
GLUCOSE SERPL-MCNC: 100 MG/DL (ref 65–99)
HCT VFR BLD AUTO: 40.1 % (ref 42–52)
HGB BLD-MCNC: 13.1 G/DL (ref 14–18)
INR PPP: 1.09 (ref 0.87–1.13)
MAGNESIUM SERPL-MCNC: 1.9 MG/DL (ref 1.5–2.5)
MCH RBC QN AUTO: 28.7 PG (ref 27–33)
MCHC RBC AUTO-ENTMCNC: 32.7 G/DL (ref 33.7–35.3)
MCV RBC AUTO: 87.9 FL (ref 81.4–97.8)
PLATELET # BLD AUTO: 222 K/UL (ref 164–446)
PMV BLD AUTO: 10.3 FL (ref 9–12.9)
POTASSIUM SERPL-SCNC: 4.4 MMOL/L (ref 3.6–5.5)
PROT SERPL-MCNC: 6.8 G/DL (ref 6–8.2)
PROTHROMBIN TIME: 13.8 SEC (ref 12–14.6)
RBC # BLD AUTO: 4.56 M/UL (ref 4.7–6.1)
SODIUM SERPL-SCNC: 137 MMOL/L (ref 135–145)
TSH SERPL DL<=0.005 MIU/L-ACNC: 2.41 UIU/ML (ref 0.38–5.33)
WBC # BLD AUTO: 7.5 K/UL (ref 4.8–10.8)

## 2018-01-17 PROCEDURE — 90662 IIV NO PRSV INCREASED AG IM: CPT | Performed by: INTERNAL MEDICINE

## 2018-01-17 PROCEDURE — 71045 X-RAY EXAM CHEST 1 VIEW: CPT

## 2018-01-17 PROCEDURE — 83735 ASSAY OF MAGNESIUM: CPT

## 2018-01-17 PROCEDURE — A9270 NON-COVERED ITEM OR SERVICE: HCPCS | Performed by: INTERNAL MEDICINE

## 2018-01-17 PROCEDURE — 700102 HCHG RX REV CODE 250 W/ 637 OVERRIDE(OP): Performed by: NURSE PRACTITIONER

## 2018-01-17 PROCEDURE — 80053 COMPREHEN METABOLIC PANEL: CPT

## 2018-01-17 PROCEDURE — 85610 PROTHROMBIN TIME: CPT

## 2018-01-17 PROCEDURE — 770020 HCHG ROOM/CARE - TELE (206)

## 2018-01-17 PROCEDURE — 93010 ELECTROCARDIOGRAM REPORT: CPT | Mod: 76 | Performed by: INTERNAL MEDICINE

## 2018-01-17 PROCEDURE — 700111 HCHG RX REV CODE 636 W/ 250 OVERRIDE (IP): Performed by: INTERNAL MEDICINE

## 2018-01-17 PROCEDURE — 85730 THROMBOPLASTIN TIME PARTIAL: CPT

## 2018-01-17 PROCEDURE — 90471 IMMUNIZATION ADMIN: CPT

## 2018-01-17 PROCEDURE — 36415 COLL VENOUS BLD VENIPUNCTURE: CPT

## 2018-01-17 PROCEDURE — A9270 NON-COVERED ITEM OR SERVICE: HCPCS | Performed by: NURSE PRACTITIONER

## 2018-01-17 PROCEDURE — 700102 HCHG RX REV CODE 250 W/ 637 OVERRIDE(OP): Performed by: INTERNAL MEDICINE

## 2018-01-17 PROCEDURE — 3E0234Z INTRODUCTION OF SERUM, TOXOID AND VACCINE INTO MUSCLE, PERCUTANEOUS APPROACH: ICD-10-PCS | Performed by: INTERNAL MEDICINE

## 2018-01-17 PROCEDURE — 93005 ELECTROCARDIOGRAM TRACING: CPT | Performed by: NURSE PRACTITIONER

## 2018-01-17 PROCEDURE — 85027 COMPLETE CBC AUTOMATED: CPT

## 2018-01-17 PROCEDURE — 84443 ASSAY THYROID STIM HORMONE: CPT

## 2018-01-17 RX ORDER — AMLODIPINE BESYLATE 10 MG/1
10 TABLET ORAL DAILY
Status: DISCONTINUED | OUTPATIENT
Start: 2018-01-17 | End: 2018-01-19 | Stop reason: HOSPADM

## 2018-01-17 RX ORDER — NAPROXEN 500 MG/1
500 TABLET ORAL 2 TIMES DAILY WITH MEALS
Status: ON HOLD | COMMUNITY
End: 2018-01-19

## 2018-01-17 RX ORDER — CHOLECALCIFEROL (VITAMIN D3) 125 MCG
500 CAPSULE ORAL DAILY
Status: DISCONTINUED | OUTPATIENT
Start: 2018-01-17 | End: 2018-01-19 | Stop reason: HOSPADM

## 2018-01-17 RX ORDER — CHLORAL HYDRATE 500 MG
1000 CAPSULE ORAL 2 TIMES DAILY
Status: DISCONTINUED | OUTPATIENT
Start: 2018-01-17 | End: 2018-01-19 | Stop reason: HOSPADM

## 2018-01-17 RX ORDER — POTASSIUM CHLORIDE 20 MEQ/1
40 TABLET, EXTENDED RELEASE ORAL
Status: ACTIVE | OUTPATIENT
Start: 2018-01-17 | End: 2018-01-17

## 2018-01-17 RX ORDER — TRAMADOL HYDROCHLORIDE 50 MG/1
50 TABLET ORAL EVERY 4 HOURS PRN
Status: DISCONTINUED | OUTPATIENT
Start: 2018-01-17 | End: 2018-01-19 | Stop reason: HOSPADM

## 2018-01-17 RX ORDER — FUROSEMIDE 20 MG/1
20 TABLET ORAL DAILY
Status: DISCONTINUED | OUTPATIENT
Start: 2018-01-17 | End: 2018-01-19 | Stop reason: HOSPADM

## 2018-01-17 RX ORDER — TRAMADOL HYDROCHLORIDE 50 MG/1
100 TABLET ORAL EVERY 4 HOURS PRN
Status: DISCONTINUED | OUTPATIENT
Start: 2018-01-17 | End: 2018-01-19 | Stop reason: HOSPADM

## 2018-01-17 RX ORDER — BACLOFEN 10 MG/1
10 TABLET ORAL 4 TIMES DAILY PRN
Status: DISCONTINUED | OUTPATIENT
Start: 2018-01-17 | End: 2018-01-19 | Stop reason: HOSPADM

## 2018-01-17 RX ORDER — METOPROLOL SUCCINATE 100 MG/1
50 TABLET, EXTENDED RELEASE ORAL 2 TIMES DAILY
COMMUNITY
End: 2018-04-16 | Stop reason: SDUPTHER

## 2018-01-17 RX ORDER — POTASSIUM CHLORIDE 20 MEQ/1
20 TABLET, EXTENDED RELEASE ORAL 2 TIMES DAILY
Status: DISCONTINUED | OUTPATIENT
Start: 2018-01-17 | End: 2018-01-19 | Stop reason: HOSPADM

## 2018-01-17 RX ORDER — TRAMADOL HYDROCHLORIDE 50 MG/1
50-100 TABLET ORAL EVERY 4 HOURS PRN
Status: DISCONTINUED | OUTPATIENT
Start: 2018-01-17 | End: 2018-01-17

## 2018-01-17 RX ORDER — VALSARTAN 80 MG/1
160 TABLET ORAL 2 TIMES DAILY
Status: DISCONTINUED | OUTPATIENT
Start: 2018-01-17 | End: 2018-01-19 | Stop reason: HOSPADM

## 2018-01-17 RX ORDER — DOFETILIDE 0.5 MG/1
500 CAPSULE ORAL EVERY 12 HOURS
Status: DISCONTINUED | OUTPATIENT
Start: 2018-01-17 | End: 2018-01-19 | Stop reason: HOSPADM

## 2018-01-17 RX ORDER — ASCORBIC ACID 500 MG
500 TABLET ORAL 2 TIMES DAILY
Status: DISCONTINUED | OUTPATIENT
Start: 2018-01-17 | End: 2018-01-19 | Stop reason: HOSPADM

## 2018-01-17 RX ORDER — MAGNESIUM SULFATE HEPTAHYDRATE 40 MG/ML
2 INJECTION, SOLUTION INTRAVENOUS ONCE
Status: COMPLETED | OUTPATIENT
Start: 2018-01-17 | End: 2018-01-17

## 2018-01-17 RX ORDER — DOFETILIDE 0.5 MG/1
500 CAPSULE ORAL EVERY 12 HOURS
Qty: 60 CAP | Refills: 3 | Status: SHIPPED | OUTPATIENT
Start: 2018-01-17 | End: 2018-03-06

## 2018-01-17 RX ORDER — METOPROLOL SUCCINATE 50 MG/1
100 TABLET, EXTENDED RELEASE ORAL DAILY
Status: DISCONTINUED | OUTPATIENT
Start: 2018-01-17 | End: 2018-01-19 | Stop reason: HOSPADM

## 2018-01-17 RX ORDER — FUROSEMIDE 20 MG/1
20 TABLET ORAL 2 TIMES DAILY
COMMUNITY
End: 2018-01-31 | Stop reason: SDUPTHER

## 2018-01-17 RX ORDER — OMEPRAZOLE 20 MG/1
20 CAPSULE, DELAYED RELEASE ORAL 2 TIMES DAILY PRN
Status: DISCONTINUED | OUTPATIENT
Start: 2018-01-17 | End: 2018-01-17

## 2018-01-17 RX ADMIN — MAGNESIUM SULFATE IN WATER 2 G: 40 INJECTION, SOLUTION INTRAVENOUS at 10:38

## 2018-01-17 RX ADMIN — DOFETILIDE 500 MCG: 0.5 CAPSULE ORAL at 11:38

## 2018-01-17 RX ADMIN — DOFETILIDE 500 MCG: 0.5 CAPSULE ORAL at 22:16

## 2018-01-17 RX ADMIN — OMEGA-3 FATTY ACIDS CAP 1000 MG 1000 MG: 1000 CAP at 20:24

## 2018-01-17 RX ADMIN — OXYCODONE HYDROCHLORIDE AND ACETAMINOPHEN 500 MG: 500 TABLET ORAL at 20:24

## 2018-01-17 RX ADMIN — APIXABAN 5 MG: 5 TABLET, FILM COATED ORAL at 20:24

## 2018-01-17 RX ADMIN — POTASSIUM CHLORIDE 20 MEQ: 1500 TABLET, EXTENDED RELEASE ORAL at 20:24

## 2018-01-17 RX ADMIN — INFLUENZA A VIRUSA/MICHIGAN/45/2015 X-275 (H1N1) ANTIGEN (FORMALDEHYDE INACTIVATED), INFLUENZA A VIRUS A/HONG KONG/4801/2014 X-263B (H3N2) ANTIGEN (FORMALDEHYDE INACTIVATED), AND INFLUENZA B VIRUS B/BRISBANE/60/2008 ANTIGEN (FORMALDEHYDE INACTIVATED) 0.5 ML: 60; 60; 60 INJECTION, SUSPENSION INTRAMUSCULAR at 10:38

## 2018-01-17 ASSESSMENT — ENCOUNTER SYMPTOMS
HEMOPTYSIS: 0
CONSTIPATION: 0
CHILLS: 0
FALLS: 0
FOCAL WEAKNESS: 0
SHORTNESS OF BREATH: 0
PALPITATIONS: 0
PND: 0
INSOMNIA: 0
SORE THROAT: 0
BLOOD IN STOOL: 0
DEPRESSION: 0
TINGLING: 0
DIARRHEA: 0
FEVER: 0
WHEEZING: 0
HEADACHES: 0
DIAPHORESIS: 0
BRUISES/BLEEDS EASILY: 0
DIZZINESS: 0
SINUS PAIN: 0
SPUTUM PRODUCTION: 0
COUGH: 0
ABDOMINAL PAIN: 0
WEAKNESS: 0
NAUSEA: 0
SENSORY CHANGE: 0
ORTHOPNEA: 0
VOMITING: 0
NERVOUS/ANXIOUS: 0
SPEECH CHANGE: 0

## 2018-01-17 ASSESSMENT — PAIN SCALES - GENERAL
PAINLEVEL_OUTOF10: 0

## 2018-01-17 ASSESSMENT — LIFESTYLE VARIABLES
ALCOHOL_USE: NO
EVER_SMOKED: NEVER

## 2018-01-17 ASSESSMENT — COGNITIVE AND FUNCTIONAL STATUS - GENERAL
SUGGESTED CMS G CODE MODIFIER MOBILITY: CH
SUGGESTED CMS G CODE MODIFIER DAILY ACTIVITY: CH
MOBILITY SCORE: 24
DAILY ACTIVITIY SCORE: 24

## 2018-01-17 NOTE — CARE PLAN
Problem: Safety  Goal: Will remain free from injury  Outcome: PROGRESSING AS EXPECTED  Fall precautions in place. Bed in lowest position. Non-skid socks in place. Personal possessions within reach. Mobility sign on door.  Call light within reach. Pt educated regarding fall prevention and states understanding.     Problem: Knowledge Deficit  Goal: Knowledge of disease process/condition, treatment plan, diagnostic tests, and medications will improve  Outcome: PROGRESSING AS EXPECTED  Pt educated regarding plan of care and medications. All questions answered.

## 2018-01-17 NOTE — PROGRESS NOTES
Direct admit to T832-1 at 0830. Pt oriented to room, unit, and plan of care. Tele-monitor placed and monitor room notified. All questions answered at this time. Call light within reach; fall precautions in place.
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
## 2018-01-18 LAB
ANION GAP SERPL CALC-SCNC: 8 MMOL/L (ref 0–11.9)
BUN SERPL-MCNC: 19 MG/DL (ref 8–22)
CALCIUM SERPL-MCNC: 8.9 MG/DL (ref 8.5–10.5)
CHLORIDE SERPL-SCNC: 103 MMOL/L (ref 96–112)
CO2 SERPL-SCNC: 28 MMOL/L (ref 20–33)
CREAT SERPL-MCNC: 1.01 MG/DL (ref 0.5–1.4)
EKG IMPRESSION: NORMAL
EKG IMPRESSION: NORMAL
GLUCOSE SERPL-MCNC: 103 MG/DL (ref 65–99)
MAGNESIUM SERPL-MCNC: 2 MG/DL (ref 1.5–2.5)
POTASSIUM SERPL-SCNC: 3.9 MMOL/L (ref 3.6–5.5)
SODIUM SERPL-SCNC: 139 MMOL/L (ref 135–145)

## 2018-01-18 PROCEDURE — 36415 COLL VENOUS BLD VENIPUNCTURE: CPT

## 2018-01-18 PROCEDURE — 93010 ELECTROCARDIOGRAM REPORT: CPT | Mod: 77 | Performed by: INTERNAL MEDICINE

## 2018-01-18 PROCEDURE — A9270 NON-COVERED ITEM OR SERVICE: HCPCS | Performed by: NURSE PRACTITIONER

## 2018-01-18 PROCEDURE — B24BZZ4 ULTRASONOGRAPHY OF HEART WITH AORTA, TRANSESOPHAGEAL: ICD-10-PCS | Performed by: INTERNAL MEDICINE

## 2018-01-18 PROCEDURE — 700111 HCHG RX REV CODE 636 W/ 250 OVERRIDE (IP)

## 2018-01-18 PROCEDURE — 80048 BASIC METABOLIC PNL TOTAL CA: CPT

## 2018-01-18 PROCEDURE — 83735 ASSAY OF MAGNESIUM: CPT

## 2018-01-18 PROCEDURE — 5A2204Z RESTORATION OF CARDIAC RHYTHM, SINGLE: ICD-10-PCS | Performed by: INTERNAL MEDICINE

## 2018-01-18 PROCEDURE — 700102 HCHG RX REV CODE 250 W/ 637 OVERRIDE(OP): Performed by: INTERNAL MEDICINE

## 2018-01-18 PROCEDURE — 93010 ELECTROCARDIOGRAM REPORT: CPT | Performed by: INTERNAL MEDICINE

## 2018-01-18 PROCEDURE — 700102 HCHG RX REV CODE 250 W/ 637 OVERRIDE(OP): Performed by: NURSE PRACTITIONER

## 2018-01-18 PROCEDURE — A9270 NON-COVERED ITEM OR SERVICE: HCPCS | Performed by: INTERNAL MEDICINE

## 2018-01-18 PROCEDURE — 93005 ELECTROCARDIOGRAM TRACING: CPT | Performed by: INTERNAL MEDICINE

## 2018-01-18 PROCEDURE — 770020 HCHG ROOM/CARE - TELE (206)

## 2018-01-18 RX ORDER — ALPRAZOLAM 0.25 MG/1
0.25 TABLET ORAL 3 TIMES DAILY PRN
Status: DISCONTINUED | OUTPATIENT
Start: 2018-01-18 | End: 2018-01-19 | Stop reason: HOSPADM

## 2018-01-18 RX ORDER — MIDAZOLAM HYDROCHLORIDE 1 MG/ML
INJECTION INTRAMUSCULAR; INTRAVENOUS
Status: COMPLETED
Start: 2018-01-18 | End: 2018-01-18

## 2018-01-18 RX ORDER — ACETAMINOPHEN 325 MG/1
650 TABLET ORAL EVERY 6 HOURS PRN
Status: DISCONTINUED | OUTPATIENT
Start: 2018-01-18 | End: 2018-01-19 | Stop reason: HOSPADM

## 2018-01-18 RX ORDER — POTASSIUM CHLORIDE 20 MEQ/1
20 TABLET, EXTENDED RELEASE ORAL ONCE
Status: COMPLETED | OUTPATIENT
Start: 2018-01-18 | End: 2018-01-18

## 2018-01-18 RX ADMIN — METOPROLOL SUCCINATE 100 MG: 50 TABLET, EXTENDED RELEASE ORAL at 09:00

## 2018-01-18 RX ADMIN — OXYCODONE HYDROCHLORIDE AND ACETAMINOPHEN 500 MG: 500 TABLET ORAL at 09:01

## 2018-01-18 RX ADMIN — FENTANYL CITRATE 75 MCG: 50 INJECTION, SOLUTION INTRAMUSCULAR; INTRAVENOUS at 09:45

## 2018-01-18 RX ADMIN — FUROSEMIDE 20 MG: 20 TABLET ORAL at 09:00

## 2018-01-18 RX ADMIN — DOFETILIDE 500 MCG: 0.5 CAPSULE ORAL at 09:01

## 2018-01-18 RX ADMIN — APIXABAN 5 MG: 5 TABLET, FILM COATED ORAL at 09:01

## 2018-01-18 RX ADMIN — VALSARTAN 160 MG: 80 TABLET ORAL at 09:00

## 2018-01-18 RX ADMIN — APIXABAN 5 MG: 5 TABLET, FILM COATED ORAL at 20:09

## 2018-01-18 RX ADMIN — OMEGA-3 FATTY ACIDS CAP 1000 MG 1000 MG: 1000 CAP at 09:01

## 2018-01-18 RX ADMIN — ALPRAZOLAM 0.25 MG: 0.25 TABLET ORAL at 10:07

## 2018-01-18 RX ADMIN — MIDAZOLAM 3 MG: 1 INJECTION INTRAMUSCULAR; INTRAVENOUS at 09:45

## 2018-01-18 RX ADMIN — TRAMADOL HYDROCHLORIDE 50 MG: 50 TABLET, COATED ORAL at 18:20

## 2018-01-18 RX ADMIN — ACETAMINOPHEN 650 MG: 325 TABLET, FILM COATED ORAL at 15:29

## 2018-01-18 RX ADMIN — POTASSIUM CHLORIDE 20 MEQ: 1500 TABLET, EXTENDED RELEASE ORAL at 09:01

## 2018-01-18 RX ADMIN — VALSARTAN 160 MG: 80 TABLET ORAL at 20:09

## 2018-01-18 RX ADMIN — OXYCODONE HYDROCHLORIDE AND ACETAMINOPHEN 500 MG: 500 TABLET ORAL at 20:09

## 2018-01-18 RX ADMIN — AMLODIPINE BESYLATE 10 MG: 10 TABLET ORAL at 09:01

## 2018-01-18 RX ADMIN — CYANOCOBALAMIN TAB 500 MCG 500 MCG: 500 TAB at 09:01

## 2018-01-18 RX ADMIN — OMEGA-3 FATTY ACIDS CAP 1000 MG 1000 MG: 1000 CAP at 20:09

## 2018-01-18 RX ADMIN — DOFETILIDE 500 MCG: 0.5 CAPSULE ORAL at 20:10

## 2018-01-18 RX ADMIN — MAGNESIUM GLUCONATE 500 MG ORAL TABLET 400 MG: 500 TABLET ORAL at 09:00

## 2018-01-18 RX ADMIN — POTASSIUM CHLORIDE 20 MEQ: 1500 TABLET, EXTENDED RELEASE ORAL at 20:10

## 2018-01-18 ASSESSMENT — ENCOUNTER SYMPTOMS
ORTHOPNEA: 0
SHORTNESS OF BREATH: 0
INSOMNIA: 0
HEMOPTYSIS: 0
NAUSEA: 0
DEPRESSION: 0
FEVER: 0
HEADACHES: 0
SORE THROAT: 0
FOCAL WEAKNESS: 0
NERVOUS/ANXIOUS: 1
DIZZINESS: 0
DIAPHORESIS: 0
WHEEZING: 0
CONSTIPATION: 0
SPEECH CHANGE: 0
TINGLING: 0
COUGH: 0
DIARRHEA: 0
ABDOMINAL PAIN: 0
WEAKNESS: 0
BLOOD IN STOOL: 0
FALLS: 0
CHILLS: 0
SINUS PAIN: 0
VOMITING: 0
SENSORY CHANGE: 0
PND: 0
BRUISES/BLEEDS EASILY: 0
SPUTUM PRODUCTION: 0
PALPITATIONS: 0

## 2018-01-18 ASSESSMENT — PAIN SCALES - GENERAL
PAINLEVEL_OUTOF10: 5
PAINLEVEL_OUTOF10: 5
PAINLEVEL_OUTOF10: 0
PAINLEVEL_OUTOF10: 0
PAINLEVEL_OUTOF10: 8
PAINLEVEL_OUTOF10: 6

## 2018-01-18 NOTE — PROGRESS NOTES
Tikosyn given at this time. Pt scheduled for cardioversion at 1030. Per Cardiology DORI Richmond, no need for 11am tikosyn ekg, since it will be done post procedure

## 2018-01-18 NOTE — PROCEDURES
Procedure performed: External DC Cardioversion    : Jose Mancini MD    Assistant: None    Anesthesia: Moderate sedation    Indication: Persistent atrial fibrillation    Description of procedure:    Patient was brought to the echo lab 3. Informed consent was obtained. Defibrillator pads were placed in the anterior and posterior position.  Adequate sedation was obtained with moderate sedation. Patient was successfully cardioverted with 200J synchronized biphasic energy into sinus rhythm. He was monitored in the recovery area until criteria met and will continue his tikosyn load on the floor.    Conclusion: Successful DC cardioversion    EBL: None    Complications: None    Specimens: None

## 2018-01-18 NOTE — PROGRESS NOTES
DORI Rankin notified via phone that pt's QTc increased greater than 15% from baseline. QTc 2 hrs post 2215 is 494. States she will be on the unit this morning and will review his chart before his 1000 scheduled dose is due to be given. No new orders at this time
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
## 2018-01-18 NOTE — DISCHARGE PLANNING
Medical Social Work     JONATHAN received a call from Kinjal with [facility] stating that the is still pending insurance authorization for the medication (Tikosyn). Kinjal stated that as soon as authorization goes through they will call JONATHAN. They are waiting for the provider to authorize it for Workman comp. JONATHAN advised the bedside RN that we are waiting for the authorization to go through Workman comp.     Plan: JONATHAN will follow up with [facility] at [phone] on medication authorization.

## 2018-01-18 NOTE — DISCHARGE PLANNING
Upon utilization review, patient noted to be on the following medications that could potentially require prior authorization if prescribed at discharge: Tikosyn.  If it is anticipated that patient will require these medications at discharge, beginning the prescription prior auth process in advance to anticipated discharge could assist in preventing delays when patient is medically cleared to be discharged from the hospital.
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
## 2018-01-18 NOTE — PROGRESS NOTES
Bedside report received from Ailyn JENSEN. Patient's chart and MAR reviewed. 12 hour chart check complete. Pt is awake in bed. Pt is AOx4. Patient was updated on plan of care for the day. Questions answered and concerns addressed.  Pt denies any additional needs at this time. White board updated. Call light and personal belongings within reach, bed in lowest position.

## 2018-01-18 NOTE — PROGRESS NOTES
Patient given Tikosyn per MAR. EKG ordered for two hours post Tikosyn. EKG tech notified and aware of scheduled EKG.

## 2018-01-18 NOTE — CARE PLAN
Problem: Knowledge Deficit  Goal: Knowledge of disease process/condition, treatment plan, diagnostic tests, and medications will improve  Outcome: PROGRESSING AS EXPECTED  Pt educated regarding plan of care and medications. All questions answered.     Problem: Discharge Barriers/Planning  Goal: Patient's continuum of care needs will be met  Outcome: PROGRESSING AS EXPECTED  Anticipated d/c tomorrow 1/19. SW to f/u with tikosyn copay

## 2018-01-18 NOTE — DISCHARGE PLANNING
Medical Social Work     SW received a call from [facility] with [facility] stating that they got a call form [facility] stating that the provider for workers comp is not a covered provider. Kinjal provided SW with the phone number for [facility] of [phone]. JONATHAN called [facility] and they transferred SW to another team member and provided SW with the phone number of [phone]. SW spoke with Jarett who stated that the medication is authorized and is able to be filled with [facility]. Jarett called [facility] and provided them with the authorization numbers for the medication.     1500-SW received a call from [facility] with [facility] and stated that the medication is authorized and they are filling the prescription. SW notified the APN and the RN that the medication is authorized and the pt is able to  the medication at the [facility] down stairs.     Plan: SW will remain available for pt and family support.

## 2018-01-18 NOTE — PROGRESS NOTES
Received report from day shift RN. Twelve hour chart check completed. Patient assessed. Patient A and 0 X 4.   Patient states that pain is a 0 out of 10.  Patient educated on plan of care for the evening including medications per MAR, EKG 2 hours post Tikosyn administration, monitoring vital signs, pain control, and rest. Patient educated to call for assistance.

## 2018-01-18 NOTE — PROGRESS NOTES
RN called stat EKG tech to inquire about 0015 [facility] EKG. States he is completing 2 stat EKG's and then will be here to complete this pt's EKG.

## 2018-01-18 NOTE — CARE PLAN
Problem: Communication  Goal: The ability to communicate needs accurately and effectively will improve    Intervention: [place] patient and significant other/support system to call light to alert staff of needs  Completed upon start of shift.       Problem: Safety  Goal: Will remain free from injury  Outcome: PROGRESSING AS EXPECTED  Bed in low position. Call light within reach.  Saline locked.  Bedrails closest to bathroom down.  Patient educated to call for assistance.

## 2018-01-19 VITALS
HEART RATE: 88 BPM | SYSTOLIC BLOOD PRESSURE: 131 MMHG | DIASTOLIC BLOOD PRESSURE: 80 MMHG | TEMPERATURE: 98.9 F | OXYGEN SATURATION: 94 % | WEIGHT: 274.03 LBS | RESPIRATION RATE: 16 BRPM | BODY MASS INDEX: 36.15 KG/M2

## 2018-01-19 LAB
ANION GAP SERPL CALC-SCNC: 8 MMOL/L (ref 0–11.9)
BUN SERPL-MCNC: 18 MG/DL (ref 8–22)
CALCIUM SERPL-MCNC: 8.7 MG/DL (ref 8.5–10.5)
CHLORIDE SERPL-SCNC: 100 MMOL/L (ref 96–112)
CO2 SERPL-SCNC: 24 MMOL/L (ref 20–33)
CREAT SERPL-MCNC: 1.11 MG/DL (ref 0.5–1.4)
EKG IMPRESSION: NORMAL
EKG IMPRESSION: NORMAL
GLUCOSE SERPL-MCNC: 117 MG/DL (ref 65–99)
MAGNESIUM SERPL-MCNC: 1.9 MG/DL (ref 1.5–2.5)
POTASSIUM SERPL-SCNC: 4.1 MMOL/L (ref 3.6–5.5)
SODIUM SERPL-SCNC: 132 MMOL/L (ref 135–145)

## 2018-01-19 PROCEDURE — A9270 NON-COVERED ITEM OR SERVICE: HCPCS | Performed by: INTERNAL MEDICINE

## 2018-01-19 PROCEDURE — 83735 ASSAY OF MAGNESIUM: CPT

## 2018-01-19 PROCEDURE — 36415 COLL VENOUS BLD VENIPUNCTURE: CPT

## 2018-01-19 PROCEDURE — 700102 HCHG RX REV CODE 250 W/ 637 OVERRIDE(OP): Performed by: INTERNAL MEDICINE

## 2018-01-19 PROCEDURE — 80048 BASIC METABOLIC PNL TOTAL CA: CPT

## 2018-01-19 PROCEDURE — 93005 ELECTROCARDIOGRAM TRACING: CPT | Performed by: INTERNAL MEDICINE

## 2018-01-19 PROCEDURE — 700102 HCHG RX REV CODE 250 W/ 637 OVERRIDE(OP): Performed by: NURSE PRACTITIONER

## 2018-01-19 PROCEDURE — 93010 ELECTROCARDIOGRAM REPORT: CPT | Performed by: INTERNAL MEDICINE

## 2018-01-19 PROCEDURE — A9270 NON-COVERED ITEM OR SERVICE: HCPCS | Performed by: NURSE PRACTITIONER

## 2018-01-19 RX ORDER — NAPROXEN 500 MG/1
500 TABLET ORAL PRN
COMMUNITY
Start: 2018-01-19 | End: 2020-06-18 | Stop reason: SDUPTHER

## 2018-01-19 RX ADMIN — OXYCODONE HYDROCHLORIDE AND ACETAMINOPHEN 500 MG: 500 TABLET ORAL at 09:20

## 2018-01-19 RX ADMIN — METOPROLOL SUCCINATE 100 MG: 50 TABLET, EXTENDED RELEASE ORAL at 09:20

## 2018-01-19 RX ADMIN — ACETAMINOPHEN 650 MG: 325 TABLET, FILM COATED ORAL at 09:21

## 2018-01-19 RX ADMIN — ACETAMINOPHEN 650 MG: 325 TABLET, FILM COATED ORAL at 01:10

## 2018-01-19 RX ADMIN — AMLODIPINE BESYLATE 10 MG: 10 TABLET ORAL at 09:20

## 2018-01-19 RX ADMIN — APIXABAN 5 MG: 5 TABLET, FILM COATED ORAL at 09:20

## 2018-01-19 RX ADMIN — POTASSIUM CHLORIDE 20 MEQ: 1500 TABLET, EXTENDED RELEASE ORAL at 09:21

## 2018-01-19 RX ADMIN — OMEGA-3 FATTY ACIDS CAP 1000 MG 1000 MG: 1000 CAP at 09:20

## 2018-01-19 RX ADMIN — VALSARTAN 160 MG: 80 TABLET ORAL at 09:20

## 2018-01-19 RX ADMIN — CYANOCOBALAMIN TAB 500 MCG 500 MCG: 500 TAB at 09:20

## 2018-01-19 RX ADMIN — DOFETILIDE 500 MCG: 0.5 CAPSULE ORAL at 10:35

## 2018-01-19 RX ADMIN — FUROSEMIDE 20 MG: 20 TABLET ORAL at 09:20

## 2018-01-19 RX ADMIN — MAGNESIUM GLUCONATE 500 MG ORAL TABLET 400 MG: 500 TABLET ORAL at 09:20

## 2018-01-19 ASSESSMENT — PAIN SCALES - GENERAL
PAINLEVEL_OUTOF10: 0
PAINLEVEL_OUTOF10: 3
PAINLEVEL_OUTOF10: 5
PAINLEVEL_OUTOF10: 0
PAINLEVEL_OUTOF10: 3

## 2018-01-19 NOTE — DISCHARGE INSTRUCTIONS
Discharge Instructions    Discharged to home by car with relative. Discharged via walking, hospital escort: Refused.  Special equipment needed: Not Applicable    Be sure to schedule a follow-up appointment with your primary care doctor or any specialists as instructed.     Discharge Plan:   Diet Plan: Discussed  Activity Level: Discussed  Confirmed Follow up Appointment: Appointment Scheduled  Confirmed Symptoms Management: Discussed  Medication Reconciliation Updated: Yes  Influenza Vaccine Indication: Indicated: 65 years and older  Influenza Vaccine Given - only chart on this line when given: Influenza Vaccine Given (See MAR)    I understand that a diet low in cholesterol, fat, and sodium is recommended for good health. Unless I have been given specific instructions below for another diet, I accept this instruction as my diet prescription.   Other diet: Cardiac Heart Healthy    Special Instructions: None    · Is patient discharged on Warfarin / Coumadin?   No     Depression / Suicide Risk    As you are discharged from this [facility] facility, it is important to learn how to keep safe from harming yourself.    Recognize the warning signs:  · Abrupt changes in personality, positive or negative- including increase in energy   · Giving away possessions  · Change in eating patterns- significant weight changes-  positive or negative  · Change in sleeping patterns- unable to sleep or sleeping all the time   · Unwillingness or inability to communicate  · Depression  · Unusual sadness, discouragement and loneliness  · Talk of wanting to die  · Neglect of personal appearance   · Rebelliousness- reckless behavior  · Withdrawal from people/activities they love  · Confusion- inability to concentrate     If you or a loved one observes any of these behaviors or has concerns about self-harm, here's what you can do:  · Talk about it- your feelings and reasons for harming yourself  · Remove any means that you might use to hurt  yourself (examples: pills, rope, extension cords, firearm)  · Get professional help from the community (Mental Health, Substance Abuse, psychological counseling)  · Do not be alone:Call your Safe Contact- someone whom you trust who will be there for you.  · Call your local CRISIS HOTLINE 537-5396 or 775-233-6361  · Call your local Children's Mobile Crisis Response Team [place] (804) 736-6192 or www.NationalField  · Call the toll free National Suicide Prevention Hotlines   · National Suicide Prevention Lifeline 140-840-WQAO (0334)  · National Hope Line Network 800-SUICIDE (169-5461)    Atrial Fibrillation  Atrial fibrillation is a type of irregular heart rhythm (arrhythmia). During atrial fibrillation, the upper chambers of the heart (atria) quiver continuously in a chaotic pattern. This causes an irregular and often rapid heart rate.   Atrial fibrillation is the result of the heart becoming overloaded with disorganized signals that tell it to beat. These signals are normally released one at a time by a part of the right atrium called the sinoatrial node. They then travel from the atria to the lower chambers of the heart (ventricles), causing the atria and ventricles to contract and pump blood as they pass. In atrial fibrillation, parts of the atria outside of the sinoatrial node also release these signals. This results in two problems. First, the atria receive so many signals that they do not have time to fully contract. Second, the ventricles, which can only receive one signal at a time, beat irregularly and out of rhythm with the atria.   There are three types of atrial fibrillation:   · Paroxysmal. Paroxysmal atrial fibrillation starts suddenly and stops on its own within a week.  · Persistent. Persistent atrial fibrillation lasts for more than a week. It may stop on its own or with treatment.  · Permanent. Permanent atrial fibrillation does not go away. Episodes of atrial fibrillation may lead to  permanent atrial fibrillation.  Atrial fibrillation can prevent your heart from pumping blood normally. It increases your risk of stroke and can lead to heart failure.   CAUSES   · Heart conditions, including a heart attack, heart failure, coronary artery disease, and heart valve conditions.    · Inflammation of the sac that surrounds the heart (pericarditis).  · Blockage of an artery in the lungs (pulmonary embolism).  · Pneumonia or other infections.  · Chronic lung disease.  · Thyroid problems, especially if the thyroid is overactive (hyperthyroidism).  · Caffeine, excessive alcohol use, and use of some illegal drugs.    · Use of some medicines, including certain decongestants and diet pills.  · Heart surgery.    · Birth defects.    Sometimes, no cause can be found. When this happens, the atrial fibrillation is called lone atrial fibrillation. The risk of complications from atrial fibrillation increases if you have lone atrial fibrillation and you are age 60 years or older.  RISK FACTORS  · Heart failure.  · Coronary artery disease.  · Diabetes mellitus.    · High blood pressure (hypertension).    · Obesity.    · Other arrhythmias.    · Increased age.  SIGNS AND SYMPTOMS   · A feeling that your heart is beating rapidly or irregularly.    · A feeling of discomfort or pain in your chest.    · Shortness of breath.    · Sudden light-headedness or weakness.    · Getting tired easily when exercising.    · Urinating more often than normal (mainly when atrial fibrillation first begins).    In paroxysmal atrial fibrillation, symptoms may start and suddenly stop.  DIAGNOSIS   Your health care provider may be able to detect atrial fibrillation when taking your pulse. Your health care provider may have you take a test called an ambulatory electrocardiogram (ECG). An ECG records your heartbeat patterns over a 24-hour period. You may also have other tests, such as:  · Transthoracic echocardiogram (TTE). During  echocardiography, sound waves are used to evaluate how blood flows through your heart.  · Transesophageal echocardiogram (JENNIFER).  · Stress test. There is more than one type of stress test. If a stress test is needed, ask your health care provider about which type is best for you.  · Chest X-ray exam.  · Blood tests.  · Computed tomography (CT).  TREATMENT   Treatment may include:  · Treating any underlying conditions. For example, if you have an overactive thyroid, treating the condition may correct atrial fibrillation.  · Taking medicine. Medicines may be given to control a rapid heart rate or to prevent blood clots, heart failure, or a stroke.  · Having a procedure to correct the rhythm of the heart:  ¨ Electrical cardioversion. During electrical cardioversion, a controlled, low-energy shock is delivered to the heart through your skin. If you have chest pain, very low blood pressure, or sudden heart failure, this procedure may need to be done as an emergency.  ¨ Catheter ablation. During this procedure, heart tissues that send the signals that cause atrial fibrillation are destroyed.  ¨ Surgical ablation. During this surgery, thin lines of heart tissue that carry the abnormal signals are destroyed. This procedure can either be an open-heart surgery or a minimally invasive surgery. With the minimally invasive surgery, small cuts are made to access the heart instead of a large opening.  ¨ Pulmonary venous isolation. During this surgery, tissue around the veins that carry blood from the lungs (pulmonary veins) is destroyed. This tissue is thought to carry the abnormal signals.  HOME CARE INSTRUCTIONS   · Take medicines only as directed by your health care provider. Some medicines can make atrial fibrillation worse or recur.  · If blood thinners were prescribed by your health care provider, take them exactly as directed. Too much blood-thinning medicine can cause bleeding. If you take too little, you will not have the  needed protection against stroke and other problems.  · Perform blood tests at home if directed by your health care provider. Perform blood tests exactly as directed.  · Quit smoking if you smoke.  · Do not drink alcohol.  · Do not drink caffeinated beverages such as coffee, soda, and some teas. You may drink decaffeinated coffee, soda, or tea.    · Maintain a healthy weight. Do not use diet pills unless your health care provider approves. They may make heart problems worse.    · Follow diet instructions as directed by your health care provider.  · Exercise regularly as directed by your health care provider.  · Keep all follow-up visits as directed by your health care provider. This is important.  PREVENTION   The following substances can cause atrial fibrillation to recur:   · Caffeinated beverages.  · Alcohol.  · Certain medicines, especially those used for breathing problems.  · Certain herbs and herbal medicines, such as those containing ephedra or ginseng.  · Illegal drugs, such as cocaine and amphetamines.  Sometimes medicines are given to prevent atrial fibrillation from recurring. Proper treatment of any underlying condition is also important in helping prevent recurrence.   SEEK MEDICAL CARE IF:  · You notice a change in the rate, rhythm, or strength of your heartbeat.  · You suddenly begin urinating more frequently.  · You tire more easily when exerting yourself or exercising.  SEEK IMMEDIATE MEDICAL CARE IF:   · You have chest pain, abdominal pain, sweating, or weakness.  · You feel nauseous.  · You have shortness of breath.  · You suddenly have swollen feet and ankles.  · You feel dizzy.  · Your face or limbs feel numb or weak.  · You have a change in your vision or speech.  MAKE SURE YOU:   · Understand these instructions.  · Will watch your condition.  · Will get help right away if you are not doing well or get worse.     This information is not intended to replace advice given to you by your health  care provider. Make sure you discuss any questions you have with your health care provider.     Document Released: 12/18/2006 Document Revised: 01/08/2016 Document Reviewed: 04/13/2016  [employer] Interactive Patient Education ©2016 [employer]      Cardiac Ablation  Cardiac ablation is a procedure to disable a small amount of heart tissue in very specific places. The heart has many electrical connections. Sometimes these connections are abnormal and can cause the heart to beat very fast or irregularly. By disabling some of the problem areas, heart rhythm can be improved or made normal. Ablation is done for people who:   · Have Julieta-Parkinson-White syndrome.    · Have other fast heart rhythms (tachycardia).    · Have taken medicines for an abnormal heart rhythm (arrhythmia) that resulted in:    ¨ No success.    ¨ Side effects.    · May have a high-risk heartbeat that could result in death.    LET YOUR HEALTH CARE PROVIDER KNOW ABOUT:   · Any allergies you have or any previous reactions you have had to X-ray dye, food (such as seafood), medicine, or tape.    · All medicines you are taking, including vitamins, herbs, eye drops, creams, and over-the-counter medicines.    · Previous problems you or members of your family have had with the use of anesthetics.    · Any blood disorders you have.    · Previous surgeries or procedures (such as a kidney transplant) you have had.    · Medical conditions you have (such as kidney failure).    RISKS AND COMPLICATIONS  Generally, cardiac ablation is a safe procedure. However, problems can occur and include:   · Increased risk of cancer. Depending on how long it takes to do the ablation, the dose of radiation can be high.   · Bruising and bleeding where a thin, flexible tube (catheter) was inserted during the procedure.    · Bleeding into the chest, especially into the sac that surrounds the heart (serious).  · Need for a permanent pacemaker if the normal electrical system is  "damaged.    · The procedure may not be fully effective, and this may not be recognized for months. Repeat ablation procedures are sometimes required.  BEFORE THE PROCEDURE   · Follow any instructions from your health care provider regarding eating and drinking before the procedure.    · Take your medicines as directed at regular times with water, unless instructed otherwise by your health care provider. If you are taking diabetes medicine, including insulin, ask how you are to take it and if there are any special instructions you should follow. It is common to adjust insulin dosing the day of the ablation.    PROCEDURE  · An ablation is usually performed in a catheterization laboratory with the guidance of fluoroscopy. Fluoroscopy is a type of X-ray that helps your health care provider see images of your heart during the procedure.    · An ablation is a minimally invasive procedure. This means a small cut (incision) is made in either your neck or groin. Your health care provider will decide where to make the incision based on your medical history and physical exam.  · An IV tube will be started before the procedure begins. You will be given an anesthetic or medicine to help you relax (sedative).  · The skin on your neck or groin will be numbed. A needle will be inserted into a large vein in your neck or groin and catheters will be threaded to your heart.  · A special dye that shows up on fluoroscopy pictures may be injected through the catheter. The dye helps your health care provider see the area of the heart that needs treatment.  · The catheter has electrodes on the tip. When the area of heart tissue that is causing the arrhythmia is found, the catheter tip will send an electrical current to the area and \"scar\" the tissue.  Three types of energy can be used to ablate the heart tissue:    ¨ Heat (radiofrequency energy).    ¨ Laser energy.    ¨ Extreme cold (cryoablation).    · When the area of the heart has been " ablated, the catheter will be taken out. Pressure will be held on the insertion site. This will help the insertion site clot and keep it from bleeding. A bandage will be placed on the insertion site.    AFTER THE PROCEDURE   · After the procedure, you will be taken to a recovery area where your vital signs (blood pressure, heart rate, and breathing) will be monitored. The insertion site will also be monitored for bleeding.    · You will need to lie still for 4-6 hours. This is to ensure you do not bleed from the catheter insertion site.       This information is not intended to replace advice given to you by your health care provider. Make sure you discuss any questions you have with your health care provider.     Document Released: 05/06/2010 Document Revised: 01/08/2016 Document Reviewed: 05/12/2014  [employer] Interactive Patient Education ©2016 [employer]    Dofetilide capsules (Tikosyn)  What is this medicine?  DOFETILIDE (dudley FET il estelle) is an antiarrhythmic drug. It helps make your heart beat regularly. This medicine also helps to slow rapid heartbeats.  This medicine may be used for other purposes; ask your health care provider or pharmacist if you have questions.  COMMON BRAND NAME(S): Tikosyn  What should I tell my health care provider before I take this medicine?  They need to know if you have any of these conditions:  -heart disease or heart failure  -history of low levels of potassium or magnesium  -kidney disease  -liver disease  -an unusual or allergic reaction to dofetilide, other medicines, foods, dyes, or preservatives  -pregnant or trying to get pregnant  -breast-feeding  How should I use this medicine?  Take this medicine by mouth with a glass of water. Follow the directions on the prescription label. You can take this medicine with or without food. Do not drink grapefruit juice with this medicine. Take your doses at regular intervals. Do not take your medicine more often than directed. Do not  stop taking this medicine suddenly. This may cause serious, heart-related side effects. Your doctor will tell you how much medicine to take. If your doctor wants you to stop the medicine, the dose will be slowly lowered over time to avoid any side effects.  Talk to your pediatrician regarding the use of this medicine in children. Special care may be needed.  Overdosage: If you think you have taken too much of this medicine contact a poison control center or emergency room at once.  NOTE: This medicine is only for you. Do not share this medicine with others.  What if I miss a dose?  If you miss a dose, take it as soon as you can. If it is almost time for your next dose, take only that dose. Do not take double or extra doses.  What may interact with this medicine?  Do not take this medicine with any of the following medications:  -arsenic trioxide  -certain macrolide antibiotics  -certain medicines for fungal infections like ketoconazole and itraconazole  -certain quinolone antibiotics  -cimetidine  -cisapride  -conivaptan  -cyclobenzaprine  -droperidol  -grapefruit juice  -haloperidol  -hawthorn  -imatinib  -levomethadyl  -medicines for depression, anxiety, or psychotic disturbances  -medicines for HIV, AIDS called protease inhibitors  -medicines for malaria like chloroquine and halofantrine  -megestrol  -memantine  -metformin  -methadone  -mibefradil  -mifepristone  -other medicines to control heart rhythm  -pentamidine  -phenothiazines like chlorpromazine, mesoridazine, prochlorperazine, thioridazine  -ranolazine  -sertindole  -telithromycin  -thiazide diuretics  -trimethoprim  -trospium  -vardenafil  -verapamil  -ziprasidone  This medicine may also interact with the following medications:  -antidepressants called SSRIs  -certain medicines for fungal infections like fluconazole or voriconazole  -certain heart medicines like digoxin or diltiazem  -diuretics  -dronabinol,  THC  -norfloxacin  -quinine  -zafirlukast  This list may not describe all possible interactions. Give your health care provider a list of all the medicines, herbs, non-prescription drugs, or dietary supplements you use. Also tell them if you smoke, drink alcohol, or use illegal drugs. Some items may interact with your medicine.  What should I watch for while using this medicine?  Visit your doctor or health care professional for regular checks on your progress. Wear a medical ID bracelet or chain, and carry a card that describes your disease and details of your medicine and dosage times.  Check your heart rate and blood pressure regularly while you are taking this medicine. Ask your doctor or health care professional what your heart rate and blood pressure should be, and when you should contact him or her. Your doctor or health care professional also may schedule regular tests to check your progress.  You will be started on this medicine in a specialized facility for at least three days. You will be monitored to find the right dose of medicine for you. It is very important that you take your medicine exactly as prescribed when you leave the hospital. The correct dosing of this medicine is very important to treat your condition and prevent possible serious side effects.  What side effects may I notice from receiving this medicine?  Side effects that you should report to your doctor or health care professional as soon as possible:  -allergic reactions like skin rash, itching or hives, swelling of the face, lips, or tongue  -breathing problems  -dizziness  -fast or rapid beating of the heart  -feeling faint or lightheaded  -swelling of the ankles  -unusually weak or tired  -vomiting  Side effects that usually do not require medical attention (report to your doctor or health care professional if they continue or are bothersome):  -cough  -diarrhea  -difficulty sleeping  -headache  -nausea  -stomach pain  This list may  not describe all possible side effects. Call your doctor for medical advice about side effects. You may report side effects to FDA at 1-676-PKT-6807.  Where should I keep my medicine?  Keep out of the reach of children.  Store at room temperature between 15 and 30 degrees C (59 and 86 degrees F). Protect the medicine from moisture or humidity. Keep container tightly closed. Throw away any unused medicine after the expiration date.  NOTE: This sheet is a summary. It may not cover all possible information. If you have questions about this medicine, talk to your doctor, pharmacist, or health care provider.  © 2014, [employer]. (4/9/2009 2:49:58 PM)

## 2018-01-19 NOTE — CARE PLAN
Problem: Safety  Goal: Will remain free from falls  Outcome: PROGRESSING AS EXPECTED  Fall risk assessed every shift and fall precautions in place.  Pt educated to not get up without assistance.  Verbalized understanding.   Patient is up self appropriately    Problem: Knowledge Deficit  Goal: Knowledge of the prescribed therapeutic regimen will improve  Outcome: PROGRESSING AS EXPECTED  Pt educated regarding activity, diet, meds and plan of care. Verbalized understanding.   Discussed ekg and tikosyn correlation as well as QTC.

## 2018-01-19 NOTE — PROGRESS NOTES
Monitor summary:  A fib   Converted to sinus rhythm post cardioversion at 1045, 61-74  .16/.08/.44

## 2018-01-19 NOTE — PROGRESS NOTES
Pt being discharged home, leaving the unit independently with family. Pt verbally acknowledges all discharge instructions, medications, and medications regimen. Pt gathered all personal belongings, Tele box and IV have been removed. All questions and needs have been met at this time.

## 2018-01-19 NOTE — PROGRESS NOTES
Report received at 7:00 AM from MERCEDES Bee, pt is sitting up in bed, awake and alert with no reports of pain or discomfort, pt is eager to be discharged. Bed is locked in lowest position with call light, belongings within reach, white board updated, and POC discussed. All needs met at this time.

## 2018-01-19 NOTE — CARE PLAN
Problem: Infection  Goal: Will remain free from infection  Outcome: PROGRESSING AS EXPECTED  Pt educated on the importance of hand washing to reduce the risk of infection. Pt verbally understands and performs hand hygiene to reduce to risks of infection.     Problem: Venous Thromboembolism (VTW)/Deep Vein Thrombosis (DVT) Prevention:  Goal: Patient will participate in Venous Thrombosis (VTE)/Deep Vein Thrombosis (DVT)Prevention Measures  Outcome: PROGRESSING AS EXPECTED  Pt under stand the use of the Eliquis to prevent DVTs in the lower extremities.

## 2018-01-19 NOTE — CARE PLAN
Problem: Bowel/Gastric:  Goal: Normal bowel function is maintained or improved  Outcome: PROGRESSING AS EXPECTED  Pt educated on the use of stool softeners to aide in bowel maintenance and the importance of regular bowel movements

## 2018-01-20 NOTE — DISCHARGE SUMMARY
HISTORY OF PRESENT ILLNESS:  Patient is a 67-year-old  male followed   longitudinally by Dr. Mancini.  The patient was admitted for Tikosyn for   persistent atrial fibrillation until he is followed by Dr. Reddy.  He was   admitted for Tikosyn initiation, which he has done well with, with QTCs   remaining between 470 and 480 milliseconds.  He did require cardioversion on   01/16/2018 with reversion of his rhythm to sinus rhythm.  He has maintained   sinus rhythm.  Patient has a history of benign hypertension as well as   diastolic dysfunction.  He has been on chronic anticoagulation.  His   electrolytes have remained stable throughout the hospital stay with sodium   132, potassium 4.1, chloride 100, CO2 of 24, BUN 18, and creatinine 1.11 with   EGFR greater than 60.  Magnesium level on 400 mg daily has ranged between 1.9   and 2.0.    DISCHARGE MEDICATIONS:  Will include Norvasc 10 mg daily, Eliquis 5 mg twice   daily, ascorbic acid 500 mg twice daily, B12 500 mcg daily, dofetilide 500 mcg   b.i.d., fish oil 1000 mg twice daily, Lasix 20 mg daily, Mag-Ox 400 mg daily,   metoprolol  mg daily, potassium 20 mEq twice daily, and Diovan 160 mg   twice daily.    QTC on last EKG was 478 milliseconds.    IMPRESSION:  1.  Atrial fibrillation.  2.  Admission for Tikosyn and cardioversion with success by Dr. Sonu Reddy.  3.  History of hypertension.  4.  Chronic anticoagulation.    PLAN:  The patient will be discharged home after final EKG has remained   stable.  He will be seen in the office as planned in a couple of weeks with   Dr. Reddy.  Certainly any syncope or near syncope, he will report to the   emergency room, any recurrence of atrial fibrillation of 2 hours or greater,   he will call our office.       ____________________________________     DORI Sterling / NANCI    DD:  01/19/2018 10:34:01  DT:  01/19/2018 17:15:21    D#:  [id number]  Job#:  [id number]

## 2018-01-22 ENCOUNTER — OFFICE VISIT (OUTPATIENT)
Dept: CARDIOLOGY | Facility: MEDICAL CENTER | Age: 68
End: 2018-01-22
Payer: COMMERCIAL

## 2018-01-22 VITALS
HEIGHT: 73 IN | OXYGEN SATURATION: 95 % | WEIGHT: 270 LBS | SYSTOLIC BLOOD PRESSURE: 122 MMHG | DIASTOLIC BLOOD PRESSURE: 70 MMHG | BODY MASS INDEX: 35.78 KG/M2 | HEART RATE: 67 BPM | TEMPERATURE: 97.3 F

## 2018-01-22 DIAGNOSIS — I48.19 PERSISTENT ATRIAL FIBRILLATION (HCC): ICD-10-CM

## 2018-01-22 LAB — EKG IMPRESSION: NORMAL

## 2018-01-22 PROCEDURE — 99215 OFFICE O/P EST HI 40 MIN: CPT | Mod: 25 | Performed by: INTERNAL MEDICINE

## 2018-01-22 PROCEDURE — 93000 ELECTROCARDIOGRAM COMPLETE: CPT | Performed by: INTERNAL MEDICINE

## 2018-01-22 ASSESSMENT — ENCOUNTER SYMPTOMS: PALPITATIONS: 0

## 2018-01-23 NOTE — PROGRESS NOTES
Subjective:   Warren Templeton is a 67 y.o. male who presents today for follow up of a fib    Chief Complaint: a fib on tikosyn    Feeling better from a heart standpoint doing well.  Feels better in nsr.  Did get recent cold though                Past Medical History:   Diagnosis Date   • Benign hypertensive heart disease without heart failure     Disabling from his career as a , occupation related.    • Chest pain, unspecified     ~2000 with negative cardiac cath by [facility]   • Chronic anticoagulation 11/11/2016   • Diastolic dysfunction    • MR (mitral regurgitation)    • Osteoarthritis    • Paroxysmal atrial fibrillation (CMS-HCC) 2/28/2017   • Pure hypercholesterolemia     [facility]   • RLS (restless legs syndrome)      Past Surgical History:   Procedure Laterality Date   • GASTRIC BYPASS LAPAROSCOPIC      With Daron-en-Y   • OTHER ORTHOPEDIC SURGERY  2007 note;    Bilateral, with musculoskelatal limitations     Family History   Problem Relation Age of Onset   • Hypertension Mother    • Cancer Father      Esophageal   • Cancer Brother      Brain   • Hypertension Brother      History   Smoking Status   • Never Smoker   Smokeless Tobacco   • Never Used     No Known Allergies  Outpatient Encounter Prescriptions as of 1/22/2018   Medication Sig Dispense Refill   • naproxen (NAPROSYN) 500 MG Tab as needed. Caution when using while taking Eliquis.     • magnesium oxide (MAG-OX) 400 (241.3 Mg) MG Tab tablet Take 1 Tab by mouth every day. 30 Tab 3   • dofetilide (TIKOSYN) 500 MCG Cap Take 1 Cap by mouth every 12 hours. 60 Cap 3   • furosemide (LASIX) 20 MG Tab Take 20 mg by mouth 2 times a day.     • metoprolol SR (TOPROL XL) 100 MG TABLET SR 24 HR Take 50 mg by mouth 2 Times a Day.     • potassium chloride SA (K-DUR) 10 MEQ Tab CR Take 2 Tabs by mouth 2 times a day. 120 Tab 11   • apixaban (ELIQUIS) 5mg Tab Take 1 Tab by mouth 2 Times a Day. 60 Tab 11   • Prenatal Vit-Fe Fumarate-FA (RE PRENATAL  "MULTIVITAMIN/IRON PO) Take 1 tablet by mouth 2 Times a Day.     • Omega-3 Fatty Acids (FISH OIL) 1000 MG Cap capsule Take 2,000 mg by mouth 2 Times a Day.     • cyanocobalamin (VITAMIN B-12) 500 MCG TABS Take 500 mcg by mouth every day.     • omeprazole (PRILOSEC) 20 MG CPDR Take 20 mg by mouth 2 times a day.     • tramadol (ULTRAM) 50 MG TABS Take  mg by mouth every four hours as needed for Severe Pain.     • ascorbic acid (ASCORBIC ACID) 500 MG TABS Take 500 mg by mouth 2 Times a Day.     • Calcium Citrate (CITRACAL PO) Take 2 Tabs by mouth 2 Times a Day.     • valsartan (DIOVAN) 160 MG TABS Take 160 mg by mouth 2 times a day.     • amlodipine (NORVASC) 10 MG TABS Take 10 mg by mouth every day.     • baclofen (LIORESAL) 10 MG Tab Take 10 mg by mouth 4 times a day as needed (pain).     • amoxicillin (AMOXIL) 500 MG Cap Take 2,000 mg by mouth Once. Prior to dental work.  Per pt, did not end up getting dental work, has to wait.       No facility-administered encounter medications on file as of 1/22/2018.      Review of Systems   Constitutional: Negative for malaise/fatigue.   Cardiovascular: Negative for palpitations.        Objective:   /70   Pulse 67   Temp 36.3 °C (97.3 °F)   Ht 1.854 m (6' 0.99\")   Wt 122.5 kg (270 lb)   SpO2 95%   BMI 35.63 kg/m²     Physical Exam   Constitutional: He is oriented to person, place, and time. He appears well-developed and well-nourished. No distress.   HENT:   Head: Normocephalic and atraumatic.   Right Ear: External ear normal.   Left Ear: External ear normal.   Nose: Nose normal.   Mouth/Throat: Oropharynx is clear and moist.   Eyes: Conjunctivae and EOM are normal. Pupils are equal, round, and reactive to light. Right eye exhibits no discharge. Left eye exhibits no discharge. No scleral icterus.   Neck: Normal range of motion. Neck supple. No JVD present. No tracheal deviation present.   Cardiovascular: Normal rate, regular rhythm, normal heart sounds and " intact distal pulses.  Exam reveals no gallop and no friction rub.    No murmur heard.  Pulmonary/Chest: Effort normal and breath sounds normal. No stridor. No respiratory distress. He has no wheezes. He has no rales. He exhibits no tenderness.   Abdominal: Soft. He exhibits no distension. There is no tenderness.   Musculoskeletal: He exhibits no edema or tenderness.   Neurological: He is alert and oriented to person, place, and time. No cranial nerve deficit. Coordination normal.   Skin: Skin is warm and dry. No rash noted. He is not diaphoretic. No erythema. No pallor.   Psychiatric: He has a normal mood and affect. His behavior is normal. Judgment and thought content normal.   Vitals reviewed.      Assessment:     1. Persistent atrial fibrillation (CMS-[facility])       ecg today with sinus  preop labs ordered  Reviewed old ecg and do not see any evidence of flutter  Medical Decision Making:  Today's Assessment / Status / Plan:   Offered continued dofetilide vs ablation as he has had recurrence of a fib and he prefers ablation.  Understands risk and benefits and would like to proceed.  Will isolate pulmonary veins and likely posterior wall and cti line with his history of persistent fib.

## 2018-01-31 ENCOUNTER — TELEPHONE (OUTPATIENT)
Dept: CARDIOLOGY | Facility: MEDICAL CENTER | Age: 68
End: 2018-01-31

## 2018-01-31 DIAGNOSIS — I50.9 CHF (CONGESTIVE HEART FAILURE), NYHA CLASS I, UNSPECIFIED FAILURE CHRONICITY, UNSPECIFIED TYPE (HCC): ICD-10-CM

## 2018-01-31 RX ORDER — FUROSEMIDE 20 MG/1
20 TABLET ORAL DAILY
Qty: 90 TAB | Refills: 0 | Status: SHIPPED | OUTPATIENT
Start: 2018-01-31 | End: 2018-04-28 | Stop reason: SDUPTHER

## 2018-01-31 NOTE — TELEPHONE ENCOUNTER
----- Message from Shanita Mcfarlane sent at 1/31/2018 11:15 AM PST -----  Regarding: question about Lasix  PB/Lisbeth      Patient is almost out of his Lasix, and wants to know if he is supposed to continue taking the medication or if he can stop. He can be reached at [phone].

## 2018-01-31 NOTE — TELEPHONE ENCOUNTER
Pt said he has been waiting about a week for his lasix Rx.  Rx sent via [employer] to [facility].

## 2018-02-09 ENCOUNTER — APPOINTMENT (OUTPATIENT)
Dept: ADMISSIONS | Facility: MEDICAL CENTER | Age: 68
End: 2018-02-09
Attending: INTERNAL MEDICINE
Payer: COMMERCIAL

## 2018-02-12 ENCOUNTER — HOSPITAL ENCOUNTER (OUTPATIENT)
Facility: MEDICAL CENTER | Age: 68
End: 2018-02-13
Attending: INTERNAL MEDICINE | Admitting: INTERNAL MEDICINE
Payer: COMMERCIAL

## 2018-02-12 LAB
ACT BLD: 230 SEC (ref 74–137)
ACT BLD: 246 SEC (ref 74–137)
ACT BLD: 279 SEC (ref 74–137)
ALBUMIN SERPL BCP-MCNC: 3.8 G/DL (ref 3.2–4.9)
ALBUMIN/GLOB SERPL: 1.2 G/DL
ALP SERPL-CCNC: 55 U/L (ref 30–99)
ALT SERPL-CCNC: 16 U/L (ref 2–50)
ANION GAP SERPL CALC-SCNC: 7 MMOL/L (ref 0–11.9)
AST SERPL-CCNC: 21 U/L (ref 12–45)
BASOPHILS # BLD AUTO: 0.5 % (ref 0–1.8)
BASOPHILS # BLD: 0.03 K/UL (ref 0–0.12)
BILIRUB SERPL-MCNC: 0.6 MG/DL (ref 0.1–1.5)
BUN SERPL-MCNC: 21 MG/DL (ref 8–22)
CALCIUM SERPL-MCNC: 9.7 MG/DL (ref 8.5–10.5)
CHLORIDE SERPL-SCNC: 105 MMOL/L (ref 96–112)
CO2 SERPL-SCNC: 26 MMOL/L (ref 20–33)
CREAT SERPL-MCNC: 0.96 MG/DL (ref 0.5–1.4)
EKG IMPRESSION: NORMAL
EOSINOPHIL # BLD AUTO: 0.13 K/UL (ref 0–0.51)
EOSINOPHIL NFR BLD: 2.4 % (ref 0–6.9)
ERYTHROCYTE [DISTWIDTH] IN BLOOD BY AUTOMATED COUNT: 43.7 FL (ref 35.9–50)
GLOBULIN SER CALC-MCNC: 3.2 G/DL (ref 1.9–3.5)
GLUCOSE SERPL-MCNC: 93 MG/DL (ref 65–99)
HCT VFR BLD AUTO: 37.4 % (ref 42–52)
HGB BLD-MCNC: 12.4 G/DL (ref 14–18)
IMM GRANULOCYTES # BLD AUTO: 0.02 K/UL (ref 0–0.11)
IMM GRANULOCYTES NFR BLD AUTO: 0.4 % (ref 0–0.9)
INR PPP: 1.21 (ref 0.87–1.13)
LYMPHOCYTES # BLD AUTO: 1.72 K/UL (ref 1–4.8)
LYMPHOCYTES NFR BLD: 31.4 % (ref 22–41)
MCH RBC QN AUTO: 28.9 PG (ref 27–33)
MCHC RBC AUTO-ENTMCNC: 33.2 G/DL (ref 33.7–35.3)
MCV RBC AUTO: 87.2 FL (ref 81.4–97.8)
MONOCYTES # BLD AUTO: 0.56 K/UL (ref 0–0.85)
MONOCYTES NFR BLD AUTO: 10.2 % (ref 0–13.4)
NEUTROPHILS # BLD AUTO: 3.02 K/UL (ref 1.82–7.42)
NEUTROPHILS NFR BLD: 55.1 % (ref 44–72)
NRBC # BLD AUTO: 0 K/UL
NRBC BLD-RTO: 0 /100 WBC
PLATELET # BLD AUTO: 283 K/UL (ref 164–446)
PMV BLD AUTO: 9.9 FL (ref 9–12.9)
POTASSIUM SERPL-SCNC: 4.3 MMOL/L (ref 3.6–5.5)
PROT SERPL-MCNC: 7 G/DL (ref 6–8.2)
PROTHROMBIN TIME: 15 SEC (ref 12–14.6)
RBC # BLD AUTO: 4.29 M/UL (ref 4.7–6.1)
SODIUM SERPL-SCNC: 138 MMOL/L (ref 135–145)
WBC # BLD AUTO: 5.5 K/UL (ref 4.8–10.8)

## 2018-02-12 PROCEDURE — 700102 HCHG RX REV CODE 250 W/ 637 OVERRIDE(OP)

## 2018-02-12 PROCEDURE — C1894 INTRO/SHEATH, NON-LASER: HCPCS

## 2018-02-12 PROCEDURE — G0378 HOSPITAL OBSERVATION PER HR: HCPCS

## 2018-02-12 PROCEDURE — 360995 HCHG BAYLIS TRANSSEPTAL NEEDLE

## 2018-02-12 PROCEDURE — 700111 HCHG RX REV CODE 636 W/ 250 OVERRIDE (IP)

## 2018-02-12 PROCEDURE — 305387 HCHG SUTURES

## 2018-02-12 PROCEDURE — 304952 HCHG R 2 PADS

## 2018-02-12 PROCEDURE — 160002 HCHG RECOVERY MINUTES (STAT)

## 2018-02-12 PROCEDURE — C1732 CATH, EP, DIAG/ABL, 3D/VECT: HCPCS

## 2018-02-12 PROCEDURE — 93010 ELECTROCARDIOGRAM REPORT: CPT | Mod: 76 | Performed by: INTERNAL MEDICINE

## 2018-02-12 PROCEDURE — 700101 HCHG RX REV CODE 250

## 2018-02-12 PROCEDURE — 85025 COMPLETE CBC W/AUTO DIFF WBC: CPT

## 2018-02-12 PROCEDURE — 93623 PRGRMD STIMJ&PACG IV RX NFS: CPT

## 2018-02-12 PROCEDURE — A9270 NON-COVERED ITEM OR SERVICE: HCPCS

## 2018-02-12 PROCEDURE — 93613 INTRACARDIAC EPHYS 3D MAPG: CPT

## 2018-02-12 PROCEDURE — 305383 HCHG CARTO3 REF PATCH

## 2018-02-12 PROCEDURE — 93662 INTRACARDIAC ECG (ICE): CPT

## 2018-02-12 PROCEDURE — C1893 INTRO/SHEATH, FIXED,NON-PEEL: HCPCS

## 2018-02-12 PROCEDURE — 700102 HCHG RX REV CODE 250 W/ 637 OVERRIDE(OP): Performed by: INTERNAL MEDICINE

## 2018-02-12 PROCEDURE — C1759 CATH, INTRA ECHOCARDIOGRAPHY: HCPCS

## 2018-02-12 PROCEDURE — 360980 HCHG SINGLE TRANSDUCER

## 2018-02-12 PROCEDURE — 93656 COMPRE EP EVAL ABLTJ ATR FIB: CPT

## 2018-02-12 PROCEDURE — C1731 CATH, EP, 20 OR MORE ELEC: HCPCS

## 2018-02-12 PROCEDURE — 305545 HCHG DOUBLE TRANSDUCER

## 2018-02-12 PROCEDURE — 00537 ANES CARDIAC EP PROCEDURES: CPT

## 2018-02-12 PROCEDURE — 93657 TX L/R ATRIAL FIB ADDL: CPT

## 2018-02-12 PROCEDURE — 85347 COAGULATION TIME ACTIVATED: CPT

## 2018-02-12 PROCEDURE — 93005 ELECTROCARDIOGRAM TRACING: CPT | Performed by: INTERNAL MEDICINE

## 2018-02-12 PROCEDURE — 80053 COMPREHEN METABOLIC PANEL: CPT

## 2018-02-12 PROCEDURE — 85610 PROTHROMBIN TIME: CPT

## 2018-02-12 PROCEDURE — 83735 ASSAY OF MAGNESIUM: CPT

## 2018-02-12 PROCEDURE — A9270 NON-COVERED ITEM OR SERVICE: HCPCS | Performed by: INTERNAL MEDICINE

## 2018-02-12 RX ORDER — TRAMADOL HYDROCHLORIDE 50 MG/1
50 TABLET ORAL ONCE
Status: COMPLETED | OUTPATIENT
Start: 2018-02-12 | End: 2018-02-12

## 2018-02-12 RX ORDER — METOPROLOL SUCCINATE 50 MG/1
50 TABLET, EXTENDED RELEASE ORAL 2 TIMES DAILY
Status: DISCONTINUED | OUTPATIENT
Start: 2018-02-12 | End: 2018-02-13 | Stop reason: HOSPADM

## 2018-02-12 RX ORDER — TRAMADOL HYDROCHLORIDE 50 MG/1
TABLET ORAL
Status: COMPLETED
Start: 2018-02-12 | End: 2018-02-12

## 2018-02-12 RX ORDER — TRAMADOL HYDROCHLORIDE 50 MG/1
50-100 TABLET ORAL EVERY 4 HOURS PRN
Status: DISCONTINUED | OUTPATIENT
Start: 2018-02-12 | End: 2018-02-12

## 2018-02-12 RX ORDER — SODIUM CHLORIDE, SODIUM LACTATE, POTASSIUM CHLORIDE, CALCIUM CHLORIDE 600; 310; 30; 20 MG/100ML; MG/100ML; MG/100ML; MG/100ML
INJECTION, SOLUTION INTRAVENOUS CONTINUOUS
Status: DISCONTINUED | OUTPATIENT
Start: 2018-02-12 | End: 2018-02-13

## 2018-02-12 RX ORDER — ASCORBIC ACID 500 MG
500 TABLET ORAL 2 TIMES DAILY
Status: DISCONTINUED | OUTPATIENT
Start: 2018-02-12 | End: 2018-02-13 | Stop reason: HOSPADM

## 2018-02-12 RX ORDER — ONDANSETRON 2 MG/ML
4 INJECTION INTRAMUSCULAR; INTRAVENOUS EVERY 6 HOURS PRN
Status: DISCONTINUED | OUTPATIENT
Start: 2018-02-12 | End: 2018-02-13 | Stop reason: HOSPADM

## 2018-02-12 RX ORDER — OMEPRAZOLE 20 MG/1
20 CAPSULE, DELAYED RELEASE ORAL 2 TIMES DAILY
Status: DISCONTINUED | OUTPATIENT
Start: 2018-02-12 | End: 2018-02-13 | Stop reason: HOSPADM

## 2018-02-12 RX ORDER — CHOLECALCIFEROL (VITAMIN D3) 125 MCG
500 CAPSULE ORAL DAILY
Status: DISCONTINUED | OUTPATIENT
Start: 2018-02-13 | End: 2018-02-13 | Stop reason: HOSPADM

## 2018-02-12 RX ORDER — BACLOFEN 10 MG/1
10 TABLET ORAL 4 TIMES DAILY PRN
Status: DISCONTINUED | OUTPATIENT
Start: 2018-02-12 | End: 2018-02-13 | Stop reason: HOSPADM

## 2018-02-12 RX ORDER — MIDAZOLAM HYDROCHLORIDE 1 MG/ML
INJECTION INTRAMUSCULAR; INTRAVENOUS
Status: DISPENSED
Start: 2018-02-12 | End: 2018-02-13

## 2018-02-12 RX ORDER — CHLORAL HYDRATE 500 MG
2000 CAPSULE ORAL 2 TIMES DAILY
Status: DISCONTINUED | OUTPATIENT
Start: 2018-02-12 | End: 2018-02-13 | Stop reason: HOSPADM

## 2018-02-12 RX ORDER — VALSARTAN 80 MG/1
160 TABLET ORAL 2 TIMES DAILY
Status: DISCONTINUED | OUTPATIENT
Start: 2018-02-12 | End: 2018-02-13 | Stop reason: HOSPADM

## 2018-02-12 RX ORDER — ADENOSINE 3 MG/ML
INJECTION, SOLUTION INTRAVENOUS
Status: COMPLETED
Start: 2018-02-12 | End: 2018-02-12

## 2018-02-12 RX ORDER — LIDOCAINE HYDROCHLORIDE 40 MG/ML
SOLUTION TOPICAL
Status: DISPENSED
Start: 2018-02-12 | End: 2018-02-13

## 2018-02-12 RX ORDER — TRAMADOL HYDROCHLORIDE 50 MG/1
100 TABLET ORAL EVERY 4 HOURS PRN
Status: DISCONTINUED | OUTPATIENT
Start: 2018-02-12 | End: 2018-02-13 | Stop reason: HOSPADM

## 2018-02-12 RX ORDER — POTASSIUM CHLORIDE 20 MEQ/1
20 TABLET, EXTENDED RELEASE ORAL 2 TIMES DAILY
Status: DISCONTINUED | OUTPATIENT
Start: 2018-02-12 | End: 2018-02-13 | Stop reason: HOSPADM

## 2018-02-12 RX ORDER — HEPARIN SODIUM,PORCINE 1000/ML
VIAL (ML) INJECTION
Status: DISPENSED
Start: 2018-02-12 | End: 2018-02-13

## 2018-02-12 RX ORDER — OXYCODONE HYDROCHLORIDE 5 MG/1
2.5 TABLET ORAL
Status: DISCONTINUED | OUTPATIENT
Start: 2018-02-12 | End: 2018-02-13 | Stop reason: HOSPADM

## 2018-02-12 RX ORDER — DOFETILIDE 0.5 MG/1
500 CAPSULE ORAL EVERY 12 HOURS
Status: DISCONTINUED | OUTPATIENT
Start: 2018-02-12 | End: 2018-02-13 | Stop reason: HOSPADM

## 2018-02-12 RX ORDER — OMEPRAZOLE 20 MG/1
20 CAPSULE, DELAYED RELEASE ORAL
Status: DISCONTINUED | OUTPATIENT
Start: 2018-02-12 | End: 2018-02-12

## 2018-02-12 RX ORDER — AMLODIPINE BESYLATE 10 MG/1
10 TABLET ORAL DAILY
Status: DISCONTINUED | OUTPATIENT
Start: 2018-02-13 | End: 2018-02-13 | Stop reason: HOSPADM

## 2018-02-12 RX ORDER — TRAMADOL HYDROCHLORIDE 50 MG/1
50 TABLET ORAL EVERY 4 HOURS PRN
Status: DISCONTINUED | OUTPATIENT
Start: 2018-02-12 | End: 2018-02-13 | Stop reason: HOSPADM

## 2018-02-12 RX ORDER — HEPARIN SODIUM,PORCINE 1000/ML
VIAL (ML) INJECTION
Status: COMPLETED
Start: 2018-02-12 | End: 2018-02-12

## 2018-02-12 RX ORDER — PROTAMINE SULFATE 10 MG/ML
INJECTION, SOLUTION INTRAVENOUS
Status: DISPENSED
Start: 2018-02-12 | End: 2018-02-13

## 2018-02-12 RX ORDER — SUCRALFATE 1 G/1
1 TABLET ORAL
Status: DISCONTINUED | OUTPATIENT
Start: 2018-02-12 | End: 2018-02-13 | Stop reason: HOSPADM

## 2018-02-12 RX ORDER — FUROSEMIDE 20 MG/1
20 TABLET ORAL DAILY
Status: DISCONTINUED | OUTPATIENT
Start: 2018-02-12 | End: 2018-02-13 | Stop reason: HOSPADM

## 2018-02-12 RX ORDER — LIDOCAINE HYDROCHLORIDE 20 MG/ML
INJECTION, SOLUTION INFILTRATION; PERINEURAL
Status: COMPLETED
Start: 2018-02-12 | End: 2018-02-12

## 2018-02-12 RX ADMIN — DOFETILIDE 500 MCG: 0.5 CAPSULE ORAL at 20:41

## 2018-02-12 RX ADMIN — POTASSIUM CHLORIDE 20 MEQ: 1500 TABLET, EXTENDED RELEASE ORAL at 20:41

## 2018-02-12 RX ADMIN — SUCRALFATE 1 G: 1 TABLET ORAL at 20:41

## 2018-02-12 RX ADMIN — VALSARTAN 160 MG: 80 TABLET ORAL at 20:41

## 2018-02-12 RX ADMIN — OMEGA-3 FATTY ACIDS CAP 1000 MG 2000 MG: 1000 CAP at 20:41

## 2018-02-12 RX ADMIN — OMEPRAZOLE 20 MG: 20 CAPSULE, DELAYED RELEASE ORAL at 20:40

## 2018-02-12 RX ADMIN — HEPARIN SODIUM 25000 UNITS: 5000 INJECTION, SOLUTION INTRAVENOUS at 13:44

## 2018-02-12 RX ADMIN — SODIUM CHLORIDE, SODIUM LACTATE, POTASSIUM CHLORIDE, CALCIUM CHLORIDE: 600; 310; 30; 20 INJECTION, SOLUTION INTRAVENOUS at 12:09

## 2018-02-12 RX ADMIN — ADENOSINE 12 MG: 3 INJECTION, SOLUTION INTRAVENOUS at 15:49

## 2018-02-12 RX ADMIN — LIDOCAINE HYDROCHLORIDE: 20 INJECTION, SOLUTION INFILTRATION; PERINEURAL at 13:43

## 2018-02-12 RX ADMIN — APIXABAN 5 MG: 5 TABLET, FILM COATED ORAL at 20:40

## 2018-02-12 RX ADMIN — TRAMADOL HYDROCHLORIDE 50 MG: 50 TABLET ORAL at 16:46

## 2018-02-12 RX ADMIN — OXYCODONE HYDROCHLORIDE AND ACETAMINOPHEN 500 MG: 500 TABLET ORAL at 20:42

## 2018-02-12 RX ADMIN — METOPROLOL SUCCINATE 50 MG: 50 TABLET, EXTENDED RELEASE ORAL at 20:41

## 2018-02-12 RX ADMIN — HEPARIN SODIUM: 1000 INJECTION, SOLUTION INTRAVENOUS; SUBCUTANEOUS at 13:43

## 2018-02-12 ASSESSMENT — COGNITIVE AND FUNCTIONAL STATUS - GENERAL
DAILY ACTIVITIY SCORE: 24
SUGGESTED CMS G CODE MODIFIER MOBILITY: CH
MOBILITY SCORE: 24
SUGGESTED CMS G CODE MODIFIER DAILY ACTIVITY: CH

## 2018-02-12 ASSESSMENT — PAIN SCALES - GENERAL
PAINLEVEL_OUTOF10: 0

## 2018-02-12 ASSESSMENT — PATIENT HEALTH QUESTIONNAIRE - PHQ9
SUM OF ALL RESPONSES TO PHQ9 QUESTIONS 1 AND 2: 0
1. LITTLE INTEREST OR PLEASURE IN DOING THINGS: NOT AT ALL
SUM OF ALL RESPONSES TO PHQ QUESTIONS 1-9: 0
2. FEELING DOWN, DEPRESSED, IRRITABLE, OR HOPELESS: NOT AT ALL

## 2018-02-12 ASSESSMENT — LIFESTYLE VARIABLES
ALCOHOL_USE: NO
EVER_SMOKED: NEVER

## 2018-02-12 NOTE — OR SURGEON
Immediate Post-Operative Note      PreOp Diagnosis: persistent a fib    PostOp Diagnosis: same    Procedure(s) :  eps and a fib ablation  Surgeon(s):  Sonu Reddy M.D.    Type of Anesthesia: general    Specimen: None    Estimated Blood Loss: 20 cc's    Contrast Media:  0 cc's    Fluoro Time: 2.3 min        Findings: isolated veins.  Isolated posterior wall.  Went into af from ablation catheter anteriorly  Adenosine shows lasting block    Complications: none apparent      Sonu Reddy M.D.  2/12/2018 3:59 PM

## 2018-02-13 VITALS
WEIGHT: 278 LBS | BODY MASS INDEX: 36.84 KG/M2 | HEIGHT: 73 IN | SYSTOLIC BLOOD PRESSURE: 115 MMHG | TEMPERATURE: 99.6 F | HEART RATE: 84 BPM | DIASTOLIC BLOOD PRESSURE: 77 MMHG | OXYGEN SATURATION: 91 % | RESPIRATION RATE: 16 BRPM

## 2018-02-13 LAB
EKG IMPRESSION: NORMAL
MAGNESIUM SERPL-MCNC: 1.9 MG/DL (ref 1.5–2.5)

## 2018-02-13 PROCEDURE — 93010 ELECTROCARDIOGRAM REPORT: CPT | Performed by: INTERNAL MEDICINE

## 2018-02-13 PROCEDURE — 93308 TTE F-UP OR LMTD: CPT

## 2018-02-13 PROCEDURE — 96374 THER/PROPH/DIAG INJ IV PUSH: CPT

## 2018-02-13 PROCEDURE — G0378 HOSPITAL OBSERVATION PER HR: HCPCS

## 2018-02-13 PROCEDURE — A9270 NON-COVERED ITEM OR SERVICE: HCPCS | Performed by: NURSE PRACTITIONER

## 2018-02-13 PROCEDURE — 93308 TTE F-UP OR LMTD: CPT | Mod: 26 | Performed by: INTERNAL MEDICINE

## 2018-02-13 PROCEDURE — A9270 NON-COVERED ITEM OR SERVICE: HCPCS | Performed by: INTERNAL MEDICINE

## 2018-02-13 PROCEDURE — 700102 HCHG RX REV CODE 250 W/ 637 OVERRIDE(OP): Performed by: NURSE PRACTITIONER

## 2018-02-13 PROCEDURE — 93005 ELECTROCARDIOGRAM TRACING: CPT | Performed by: INTERNAL MEDICINE

## 2018-02-13 PROCEDURE — 700111 HCHG RX REV CODE 636 W/ 250 OVERRIDE (IP): Performed by: NURSE PRACTITIONER

## 2018-02-13 PROCEDURE — 700102 HCHG RX REV CODE 250 W/ 637 OVERRIDE(OP): Performed by: INTERNAL MEDICINE

## 2018-02-13 RX ORDER — SUCRALFATE 1 G/1
1 TABLET ORAL
Qty: 56 TAB | Refills: 0 | Status: SHIPPED | OUTPATIENT
Start: 2018-02-13 | End: 2018-02-27

## 2018-02-13 RX ORDER — COLCHICINE 0.6 MG/1
0.6 TABLET ORAL 2 TIMES DAILY
Status: DISCONTINUED | OUTPATIENT
Start: 2018-02-13 | End: 2018-02-13 | Stop reason: HOSPADM

## 2018-02-13 RX ORDER — KETOROLAC TROMETHAMINE 30 MG/ML
15 INJECTION, SOLUTION INTRAMUSCULAR; INTRAVENOUS ONCE
Status: COMPLETED | OUTPATIENT
Start: 2018-02-13 | End: 2018-02-13

## 2018-02-13 RX ORDER — SUCRALFATE 1 G/1
1 TABLET ORAL
Qty: 56 TAB | Refills: 0 | Status: SHIPPED | OUTPATIENT
Start: 2018-02-13 | End: 2018-02-13

## 2018-02-13 RX ORDER — COLCHICINE 0.6 MG/1
0.6 TABLET ORAL 2 TIMES DAILY
Qty: 28 TAB | Refills: 0 | Status: SHIPPED | OUTPATIENT
Start: 2018-02-13 | End: 2018-02-13

## 2018-02-13 RX ORDER — COLCHICINE 0.6 MG/1
0.6 TABLET ORAL 2 TIMES DAILY
Qty: 28 TAB | Refills: 0 | Status: SHIPPED | OUTPATIENT
Start: 2018-02-13 | End: 2018-02-27

## 2018-02-13 RX ADMIN — SUCRALFATE 1 G: 1 TABLET ORAL at 06:02

## 2018-02-13 RX ADMIN — DOFETILIDE 500 MCG: 0.5 CAPSULE ORAL at 09:57

## 2018-02-13 RX ADMIN — FUROSEMIDE 20 MG: 20 TABLET ORAL at 09:56

## 2018-02-13 RX ADMIN — OMEPRAZOLE 20 MG: 20 CAPSULE, DELAYED RELEASE ORAL at 09:56

## 2018-02-13 RX ADMIN — KETOROLAC TROMETHAMINE 15 MG: 30 INJECTION, SOLUTION INTRAMUSCULAR at 11:48

## 2018-02-13 RX ADMIN — COLCHICINE 0.6 MG: 0.6 TABLET, FILM COATED ORAL at 11:49

## 2018-02-13 RX ADMIN — Medication 500 MCG: at 11:50

## 2018-02-13 RX ADMIN — OMEGA-3 FATTY ACIDS CAP 1000 MG 2000 MG: 1000 CAP at 11:49

## 2018-02-13 RX ADMIN — AMLODIPINE BESYLATE 10 MG: 10 TABLET ORAL at 09:56

## 2018-02-13 RX ADMIN — POTASSIUM CHLORIDE 20 MEQ: 1500 TABLET, EXTENDED RELEASE ORAL at 09:56

## 2018-02-13 RX ADMIN — METOPROLOL SUCCINATE 50 MG: 50 TABLET, EXTENDED RELEASE ORAL at 09:56

## 2018-02-13 RX ADMIN — OXYCODONE HYDROCHLORIDE AND ACETAMINOPHEN 500 MG: 500 TABLET ORAL at 09:56

## 2018-02-13 RX ADMIN — APIXABAN 5 MG: 5 TABLET, FILM COATED ORAL at 09:56

## 2018-02-13 RX ADMIN — MAGNESIUM GLUCONATE 500 MG ORAL TABLET 400 MG: 500 TABLET ORAL at 09:57

## 2018-02-13 RX ADMIN — VALSARTAN 160 MG: 80 TABLET ORAL at 09:56

## 2018-02-13 ASSESSMENT — PAIN SCALES - GENERAL
PAINLEVEL_OUTOF10: 2
PAINLEVEL_OUTOF10: 0
PAINLEVEL_OUTOF10: 3
PAINLEVEL_OUTOF10: 3

## 2018-02-13 NOTE — PROCEDURES
DATE OF SERVICE:  02/12/2018    INDICATION FOR PROCEDURE:  Persistent atrial fibrillation.    PROCEDURAL COMPONENTS:  1.  Electrophysiology study with atrial fibrillation ablation.  2.  Guidance with 3-dimensional mapping.  3.  Guidance with intracardiac echocardiography.  4.  Additional ablation for atrial fibrillation isolating the posterior wall.  5.  Programmed stimulation after administration of adenosine to make sure that   the posterior wall remained blocked.    PROCEDURE IN DETAIL:  After obtaining informed consent, the patient was   brought to cardiac catheterization laboratory in a fasted state.  He was   prepped and draped after being placed under general anesthesia.  He had an   esophageal temperature probe and arterial blood pressure monitoring line   performed by the anesthesiologist.  Once he was prepped and draped in the   usual sterile fashion, I began by getting access using ultrasound guidance   placing two 8[place] sheaths in the right femoral vein, one 8[place] sheath in   the left femoral vein, and a 7[place] sheath in the right internal jugular   vein.  I used ultrasound guidance for all of these sheaths.  The 7[place]   sheath in the right internal jugular vein had a duodecapolar coronary sinus   catheter, it was used for sensing and pacing of both the right atrium and the   left atrium.  Once this was in place, I turned my attention to the 8[place]   sheath in the left side, and I placed intracardiac echocardiography catheter.    I used this to help refine the 3-dimensional map of the heart as well as to   guide transseptal.  I then exchanged the two 8-[place] sheaths for a long   preface sheaths with the [place] needle and performed transseptal   catheterization as soon as first transseptal was across.  Patient was   heparinized with weight-based heparin drip and bolus.  The first transseptal   had a force contact sensing irrigated mapping and ablation catheter and the   second had a  PentaRay mapping catheter.  I then used these 2 catheters to   finish making 3-dimensional shell of the left atrium.  At this point, I then   performed wide area circumferential ablation around the left veins and the   right veins.  I developed entrance and exit block from both sets of veins.  At   this point, with the veins isolated, I then attempted to isolate the   posterolateral wall.  I performed a line along the roof with careful motion to   avoid too much esophageal heating.  I never heated the esophagus above ____   degrees Celsius.  I then turned my attention to a floor line.  With completion   of this line, I was still not blocked, so I repeatedly performed activation   mapping of the box, and I was finding early spots both on the superior and   inferior lines with ablation of the superior line.  Patient got irritation   from the ablation catheter and went into atrial fibrillation.  I did further   ablation.  It appeared that I had, the patient, put the posterior wall   isolated.  I did perform inside this both the entrance and exit blocks from   the posterior wall with activation maps of both and found the earliest   activation into the box and out of the box.  Once he was cardioverted, I then   saw the posterior wall was isolated with pacing.  I could capture the   posterior wall, it would not get into the rest of the atrium.  I gave the   patient adenosine with 12 mg enough to cause AV block, and there still   remained entrance and exit block from the posterior wall showing that we   likely had durable lesions.  At this point, we performed an EP study after I   had delivered radiofrequency 17 times for a total of 3221 seconds.  With the   EP study, AV Wenckebach occurred at 370, atrial ERP was 220 pacing at 600.    There was no evidence of dual pricila physiology, no other inducible   tachycardia, no predominant APCs.  AH was 101, HV was 63, his NE was 167, QRS   duration was 107.    CONCLUSION:  1.   Successful pulmonary vein isolation.  2.  Successful posterior wall isolation.  3.  Patient did go into atrial fibrillation during the case, but appears to   have originated from the catheter ablating just anterior to our roof line.  4.  No other inducible tachycardia.  5.  Slightly prolonged HV, but otherwise normal intervals.  6.  No inducible tachycardia.    DISCUSSION:  Patient did very well with the procedure.  We will continue him   on his Tikosyn for now, but I am hopeful we will be able to wean this in   followup.  He will continue anticoagulation for 3 months at least, but   possibly long-term.  He will take esophageal protection medicines for the next   month.       ____________________________________     MD MURALI Solitario / NANCI    DD:  02/12/2018 19:42:54  DT:  02/13/2018 01:42:07    D#:  [id number]  Job#:  [id number]

## 2018-02-13 NOTE — PROGRESS NOTES
Discharge instructions given to patient.  Questions and concerns addressed and answered.  Heart monitor and IVs removed.  Pt has all belongings at discharge.  Down to car with RN and family.

## 2018-02-13 NOTE — OR NURSING
1600   REPORT RECEIVED FROM CATH LAB.  ALL SHEATHS ARE PULLED IN CATH LAB,  HEMOSTASIS ACHIEVED.   ART LINE PULLED IN CATH LAB BY ANESTHESIOLOGIST.    1615   ALL CATH SITES CHECKED AND CLEAR.    1630   ALL CATH SITES CHECKED AND CLEAR.  LOTS OF FAMILY AT BEDSIDE.    1700   REPORT GIVEN TO BINDU JENSEN ON TELE 8TH.  PATIENT TRANSPORTED TO [place] [address] VIA BED,  ON MONITOR,  NO OXYGEN,  ESCORTED BY RN AND CNA.

## 2018-02-13 NOTE — PROGRESS NOTES
2 RN skin check performed with Bella Nesbitt. All areas of skin without pressure related breakdown. Bilateral ears pink and blanching. Pt has right jugular and bilateral femoral puncture sites from procedure. All puncture sites dry, soft, without signs of swelling or bruising, and dressings intact.

## 2018-02-13 NOTE — PROGRESS NOTES
Patient arrived to floor from PPU s/p ablation, bedside report received from RN. Stefania. Flat bedrest until 2030. Pt verbalizes understanding. Right jugular puncture and bilateral groin punctures all dry, soft, with dressings intact. CMS intact to BLE, pedal pulse 2+ bilaterally. Pt oriented to room, on post op vitals. All questions answered. Pt care assumed.

## 2018-02-13 NOTE — DISCHARGE INSTRUCTIONS
Discharge Instructions    Discharged to home by car with relative. Discharged via walking, hospital escort: Refused.  Special equipment needed: Not Applicable    Be sure to schedule a follow-up appointment with your primary care doctor or any specialists as instructed.     Discharge Plan:   Diet Plan: Discussed  Activity Level: Discussed  Confirmed Follow up Appointment: Appointment Scheduled  Confirmed Symptoms Management: Discussed  Medication Reconciliation Updated: Yes  Influenza Vaccine Indication: Not indicated: Previously immunized this influenza season and > 8 years of age    I understand that a diet low in cholesterol, fat, and sodium is recommended for good health. Unless I have been given specific instructions below for another diet, I accept this instruction as my diet prescription.   Other diet: heart healthy    Special Instructions: None    · Is patient discharged on Warfarin / Coumadin?   No     Post ablation instructions: No lifting > 10 lbs x 1 week.  No baths or hot tubs x 1 week.  May shower on 2/14/18 and take off dressings.  Continue to monitor sites daily for warmth, redness, or discolored drainage.  Please walk and take deep breaths. After discharge if you experience chest pain, shortness of breath, coughing up blood, stroke symptoms, fever > 101F, passing out/near passing out, or trouble with the catheter sites, please be seen in the emergency dept. Please call our office at [phone] for atrial fibrillation lasting longer than 2 hours or if you develop signs or symptoms of a urinary tract infection (pain or burning during urination, frequency, urgency, drainage, fever).      Cardiac Ablation   Cardiac ablation is a procedure to stop some heart tissue from causing problems. The heart has many electrical connections. Sometimes these connections cause the heart to beat very fast or irregularly. Removing some of the problem areas can improve heart rhythm or make it normal. Ablation is done  for people who:   · Have Julieta-Parkinson-White syndrome.  · Have other fast heart rhythms (tachycardia).  · Have taken medicines for an abnormal heart rhythm (arrhythmia) and the medicines had:  ¨ No success.  ¨ Side effects.  · May have a type of heartbeat that could cause death.  BEFORE THE PROCEDURE   · Follow instructions from your doctor about eating and drinking before the procedure.  · Take your medicines as told by your doctor. Take them at regular times with water unless told differently by your doctor.  · If you are taking diabetes medicine, ask your doctor how to take it. Ask if there are any special instructions you should follow. Your doctor may change how much insulin you take the day of the procedure.  PROCEDURE   · A special type of X-ray will be used. The X-ray helps your doctor see images of your heart during the procedure.  · A small cut (incision) will be made in your neck or groin.  · An IV tube will be started before the procedure begins.  · You will be given a numbing medicine (anesthetic) or a medicine to help you relax (sedative).  · The skin on your neck or groin will be numbed.  · A needle will be put into a large vein in your neck or groin.  · A thin, flexible tube (catheter) will be put in to reach your heart.  · A dye will be put in the tube. The dye will show up on X-rays. It will help your doctor see the area of the heart that needs treatment.  · When the heart tissue that is causing problems is found, the tip of the tube will send an electrical current to it. This will stop it from causing problems.  · The tube will be taken out.  · Pressure will be put on the area where the tube was. This will keep it from bleeding. A bandage will be placed over the area.  AFTER THE PROCEDURE  · You will be taken to a recovery area. Your blood pressure, heart rate, and breathing will be watched. The area where the tube was will also be watched for bleeding.  · You will need to lie still for 4-6  hours. This keeps the area where the tube was from bleeding.     This information is not intended to replace advice given to you by your health care provider. Make sure you discuss any questions you have with your health care provider.     Document Released: 08/20/2014 Document Revised: 01/08/2016 Document Reviewed: 08/20/2014  [employer] Interactive Patient Education ©2016 [employer].      Colchicine tablets  What is this medicine?  COLCHICINE (KOL chi seen) is for joint pain and swelling due to attacks of acute gouty arthritis. The medicine is also used to treat familial Mediterranean fever.  This medicine may be used for other purposes; ask your health care provider or pharmacist if you have questions.  COMMON BRAND NAME(S): Colcrys   What should I tell my health care provider before I take this medicine?  They need to know if you have any of these conditions:  -anemia  -blood disorders like leukemia or lymphoma  -heart disease  -immune system problems  -intestinal disease  -kidney disease  -liver disease  -muscle pain or weakness  -take other medicines  -stomach problems  -an unusual or allergic reaction to colchicine, other medicines, lactose, foods, dyes, or preservatives  -pregnant or trying to get pregnant  -breast-feeding  How should I use this medicine?  Take this medicine by mouth with a full glass of water. Follow the directions on the prescription label. You can take it with or without food. If it upsets your stomach, take it with food. Take your medicine at regular intervals. Do not take your medicine more often than directed.  A special MedGuide will be given to you by the pharmacist with each prescription and refill. Be sure to read this information carefully each time.  Talk to your pediatrician regarding the use of this medicine in children. While this drug may be prescribed for children as young as 4 years old for selected conditions, precautions do apply.  Patients over 65 years old may have a  stronger reaction and need a smaller dose.  Overdosage: If you think you have taken too much of this medicine contact a poison control center or emergency room at once.  NOTE: This medicine is only for you. Do not share this medicine with others.  What if I miss a dose?  If you miss a dose, take it as soon as you can. If it is almost time for your next dose, take only that dose. Do not take double or extra doses.  What may interact with this medicine?  -alcohol  -certain medicines for cholesterol like atorvastatin  -certain medicines for coughs and colds  -certain medicines to help you breathe better  -cyclosporine  -digoxin  -epinephrine  -grapefruit or grapefruit juice  -methenamine  -sodium bicarbonate  -some antibiotics like clarithromycin, erythromycin, and telithromycin  -some medicines for an irregular heartbeat or other heart problems  -some medicines for cancer, like lapatinib and tamoxifen  -some medicines for fungal infection  -some medicines for HIV  This list may not describe all possible interactions. Give your health care provider a list of all the medicines, herbs, non-prescription drugs, or dietary supplements you use. Also tell them if you smoke, drink alcohol, or use illegal drugs. Some items may interact with your medicine.  What should I watch for while using this medicine?  Visit your doctor or health care professional for regular checks on your progress. You may need periodic blood checks.  Alcohol can increase the chance of getting stomach problems and gout attacks. Do not drink alcohol.  What side effects may I notice from receiving this medicine?  Side effects that you should report to your doctor or health care professional as soon as possible:  -allergic reactions like skin rash, itching or hives, swelling of the face, lips, or tongue  -fever, chills, or sore throat  -muscle tenderness, pain, or weakness  -numbness or tingling in hands or feet  -unusual bleeding or bruising  -unusually  weak or tired  -vomiting  Side effects that usually do not require medical attention (report to your doctor or health care professional if they continue or are bothersome):  -diarrhea  -hair loss  -loss of appetite  -stomach pain or nausea  This list may not describe all possible side effects. Call your doctor for medical advice about side effects. You may report side effects to FDA at 2-383-FAR-2073.  Where should I keep my medicine?  Keep out of the reach of children.  Store at room temperature between 15 and 30 degrees C (59 and 86 degrees F). Keep container tightly closed. Protect from light. Throw away any unused medicine after the expiration date.  NOTE: This sheet is a summary. It may not cover all possible information. If you have questions about this medicine, talk to your doctor, pharmacist, or health care provider.  © 2014, [employer]. (8/9/2011 12:57:48 PM)          Depression / Suicide Risk    As you are discharged from this [facility] facility, it is important to learn how to keep safe from harming yourself.    Recognize the warning signs:  · Abrupt changes in personality, positive or negative- including increase in energy   · Giving away possessions  · Change in eating patterns- significant weight changes-  positive or negative  · Change in sleeping patterns- unable to sleep or sleeping all the time   · Unwillingness or inability to communicate  · Depression  · Unusual sadness, discouragement and loneliness  · Talk of wanting to die  · Neglect of personal appearance   · Rebelliousness- reckless behavior  · Withdrawal from people/activities they love  · Confusion- inability to concentrate     If you or a loved one observes any of these behaviors or has concerns about self-harm, here's what you can do:  · Talk about it- your feelings and reasons for harming yourself  · Remove any means that you might use to hurt yourself (examples: pills, rope, extension cords, firearm)  · Get  professional help from the community (Mental Health, Substance Abuse, psychological counseling)  · Do not be alone:Call your Safe Contact- someone whom you trust who will be there for you.  · Call your local CRISIS HOTLINE 655-1771 or 073-825-5553  · Call your local Children's Mobile Crisis Response Team [place] (066) 407-1682 or www.2[employer] Watch  · Call the toll free National Suicide Prevention Hotlines   · National Suicide Prevention Lifeline 145-688-XSZI (9168)  · National Hope Line Network 800-SUICIDE (844-1325)

## 2018-02-14 NOTE — DISCHARGE SUMMARY
CHIEF COMPLAINT ON ADMISSION:  Persistent atrial fibrillation    CODE STATUS  Full code    Date of admission:  2/12/18  Date of discharge:  2/13/18    HPI & HOSPITAL COURSE  This is a 67 y.o. male who was electively admitted for atrial fib ablation, which was performed by Dr. Reddy on 2/12/18. In January he was initiated on tikosyn and underwent DCCV, but had recurrence of his AF.  He has tolerated the tikosyn well. QTc interval this morning was stable at 480 ms. EKG showed SR rate 77.  He did have some post-procedure CP, so a limited echo was obtained and was negative for pericardial effusion. The pain was relieved by a dose of toradol.  The patient was discharged on colchicine BID x 14 days in addition to esophageal protection meds.  No friction rub on exam.  Vital signs were stable overnight & prior to discharge.  He has maintained SR since the procedure. Bilateral groin & right neck catheter sites are soft & uncomplicated.  Post-op instructions were reviewed with the patient at the bedside.  No lifting > 10 lbs x 1 week.  No baths or hot tubs x 1 week.  May shower on 2/14/18 and take off dressings.  Continue to monitor sites daily for warmth, redness, or discolored drainage.  Please walk and take deep breaths. After discharge if you experience chest pain, shortness of breath, coughing up blood, stroke symptoms, fever > 101F, passing out/near passing out, or trouble with the catheter sites, please be seen in the emergency dept. Please call our office at [phone] for atrial fibrillation lasting longer than 2 hours or if you develop signs or symptoms of a urinary tract infection (pain or burning during urination, frequency, urgency, drainage, fever). He verbalized understanding and all questions were answered.    Recommendations:  #Continue tikosyn 500 mcg for now, but will try to wean off as outpatient.   #Continue eliquis for AC for at least 3 months, but may be needed long term.  #Colchicine BID x 2 weeks.   "Can lower to daily if experiences diarrhea.  #Continue K & Mg supplementation.  #Omeprazole x 1 month  #Carafate x 1 month  #Advised against continued use of naprosyn  #F/u in our office in appox 3 weeks (appt made prior to discharge)    Therefore, he is discharged in good and stable condition with close outpatient follow-up.    /77   Pulse 84   Temp 37.6 °C (99.6 °F)   Resp 16   Ht 1.854 m (6' 1\")   Wt (!) 126.1 kg (278 lb)   SpO2 91%   BMI 36.68 kg/m²     DISCHARGE PROBLEM LIST  Active Problems:    * No active hospital problems. *  Resolved Problems:    * No resolved hospital problems. *    FOLLOW UP  Future Appointments  Date Time Provider Department Center   3/6/2018 1:40 PM MAICO Ferrer None     MEDICATIONS ON DISCHARGE   GloriaWarren rich Pee   [facility] Medication Instructions DAVIN:[id number]    Printed on:02/13/18 [id number]   Medication Information                      amlodipine (NORVASC) 10 MG TABS  Take 10 mg by mouth every day.             apixaban (ELIQUIS) 5mg Tab  Take 1 Tab by mouth 2 Times a Day.             ascorbic acid (ASCORBIC ACID) 500 MG TABS  Take 500 mg by mouth 2 Times a Day.             baclofen (LIORESAL) 10 MG Tab  Take 10 mg by mouth 4 times a day as needed (pain).             Calcium Citrate (CITRACAL PO)  Take 2 Tabs by mouth 2 Times a Day.             colchicine (COLCRYS) 0.6 MG Tab  Take 1 Tab by mouth 2 Times a Day for 14 days.             cyanocobalamin (VITAMIN B-12) 500 MCG TABS  Take 500 mcg by mouth every day.             dofetilide (TIKOSYN) 500 MCG Cap  Take 1 Cap by mouth every 12 hours.             furosemide (LASIX) 20 MG Tab  Take 1 Tab by mouth every day.             magnesium oxide (MAG-OX) 400 (241.3 Mg) MG Tab tablet  Take 1 Tab by mouth every day.             metoprolol SR (TOPROL XL) 100 MG TABLET SR 24 HR  Take 50 mg by mouth 2 Times a Day.             naproxen (NAPROSYN) 500 MG Tab  as needed. Caution when using while taking Eliquis.       "       Omega-3 Fatty Acids (FISH OIL) 1000 MG Cap capsule  Take 2,000 mg by mouth 2 Times a Day.             omeprazole (PRILOSEC) 20 MG CPDR  Take 20 mg by mouth 2 times a day.             potassium chloride SA (K-DUR) 10 MEQ Tab CR  Take 2 Tabs by mouth 2 times a day.             Prenatal Vit-Fe Fumarate-FA (RE PRENATAL MULTIVITAMIN/IRON PO)  Take 1 tablet by mouth 2 Times a Day.             sucralfate (CARAFATE) 1 GM Tab  Take 1 Tab by mouth 4 Times a Day,Before Meals and at Bedtime for 14 days.             tramadol (ULTRAM) 50 MG TABS  Take  mg by mouth every four hours as needed for Severe Pain.             valsartan (DIOVAN) 160 MG TABS  Take 160 mg by mouth 2 times a day.               DIET: Cardiac    ACTIVITY  As tolerated.  Exercise encouraged.  5-10-lb lifting restriction for 1 week.    PROCEDURES  : Dr. Sonu Reddy    DATE OF SERVICE:  02/12/2018     INDICATION FOR PROCEDURE:  Persistent atrial fibrillation.     PROCEDURAL COMPONENTS:  1.  Electrophysiology study with atrial fibrillation ablation.  2.  Guidance with 3-dimensional mapping.  3.  Guidance with intracardiac echocardiography.  4.  Additional ablation for atrial fibrillation isolating the posterior wall.  5.  Programmed stimulation after administration of adenosine to make sure that the posterior wall remained blocked.      CONCLUSION:  1.  Successful pulmonary vein isolation.  2.  Successful posterior wall isolation.  3.  Patient did go into atrial fibrillation during the case, but appears to have originated from the catheter ablating just anterior to our roof line.  4.  No other inducible tachycardia.  5.  Slightly prolonged HV, but otherwise normal intervals.  6.  No inducible tachycardia.     DISCUSSION:  Patient did very well with the procedure.  We will continue him on his Tikosyn for now, but I am hopeful we will be able to wean this in follow-up. He will continue anticoagulation for 3 months at least, but possibly  long-term. He will take esophageal protection medicines for the next month.    LABORATORY  Lab Results   Component Value Date/Time    SODIUM 138 02/12/2018 12:05 PM    POTASSIUM 4.3 02/12/2018 12:05 PM    CHLORIDE 105 02/12/2018 12:05 PM    CO2 26 02/12/2018 12:05 PM    GLUCOSE 93 02/12/2018 12:05 PM    BUN 21 02/12/2018 12:05 PM    CREATININE 0.96 02/12/2018 12:05 PM      Lab Results   Component Value Date/Time    WBC 5.5 02/12/2018 12:05 PM    HEMOGLOBIN 12.4 (L) 02/12/2018 12:05 PM    HEMATOCRIT 37.4 (L) 02/12/2018 12:05 PM    PLATELETCT 283 02/12/2018 12:05 PM      Total time of the discharge process exceeds 40 minutes

## 2018-03-06 ENCOUNTER — OFFICE VISIT (OUTPATIENT)
Dept: CARDIOLOGY | Facility: MEDICAL CENTER | Age: 68
End: 2018-03-06
Payer: COMMERCIAL

## 2018-03-06 VITALS
HEIGHT: 73 IN | BODY MASS INDEX: 36.31 KG/M2 | HEART RATE: 82 BPM | DIASTOLIC BLOOD PRESSURE: 70 MMHG | WEIGHT: 274 LBS | OXYGEN SATURATION: 93 % | SYSTOLIC BLOOD PRESSURE: 110 MMHG

## 2018-03-06 DIAGNOSIS — Z79.899 HIGH RISK MEDICATION USE: ICD-10-CM

## 2018-03-06 DIAGNOSIS — Z79.01 CHRONIC ANTICOAGULATION: Chronic | ICD-10-CM

## 2018-03-06 DIAGNOSIS — I48.91 ATRIAL FIBRILLATION, UNSPECIFIED TYPE (HCC): ICD-10-CM

## 2018-03-06 PROCEDURE — 93000 ELECTROCARDIOGRAM COMPLETE: CPT | Mod: 29 | Performed by: NURSE PRACTITIONER

## 2018-03-06 PROCEDURE — 99214 OFFICE O/P EST MOD 30 MIN: CPT | Mod: 29 | Performed by: NURSE PRACTITIONER

## 2018-03-06 RX ORDER — DOFETILIDE 0.25 MG/1
250 CAPSULE ORAL 2 TIMES DAILY
Qty: 60 CAP | Refills: 1 | Status: SHIPPED | OUTPATIENT
Start: 2018-03-06 | End: 2018-05-07 | Stop reason: SDUPTHER

## 2018-03-06 ASSESSMENT — ENCOUNTER SYMPTOMS
FEVER: 0
PND: 0
DEPRESSION: 1
BRUISES/BLEEDS EASILY: 0
HEARTBURN: 0
DIZZINESS: 0
CHILLS: 0
DIARRHEA: 0
FOCAL WEAKNESS: 0
SENSORY CHANGE: 0
DIAPHORESIS: 0
SPEECH CHANGE: 0
SPUTUM PRODUCTION: 0
SHORTNESS OF BREATH: 0
WEAKNESS: 0
BLOOD IN STOOL: 0
PALPITATIONS: 0
NERVOUS/ANXIOUS: 1
HEMOPTYSIS: 0
WHEEZING: 0
NAUSEA: 0
CONSTIPATION: 0
COUGH: 0
ORTHOPNEA: 0
VOMITING: 0
ABDOMINAL PAIN: 0
INSOMNIA: 0
HEADACHES: 0

## 2018-03-06 NOTE — PROGRESS NOTES
"Subjective:     Chief Complaint: A-fib ablation f/u    Warren Templeton is a 68 y.o. retired  who presents today for follow up after having an a-fib ablation done 2/12/18 by Dr. Reddy. Was re-initiated on tikosyn appox 1 month prior to the procedure after going off of it in late 2017.  Has historically had anxiety associated with tikosyn.    Overall feels good since the ablation.  Has noticed that he's a bit more tired during the day, but thinks it may because he's been feeling more depressed recently.  No suicidal thoughts.  Still having anxiety on tikosyn but states it's better than when he was previously on it.    Having random, mild chest \"discomfort\" on the right side of his chest x 4 days that feels like it's in between his chest & back.  Not sharp or shooting.  Non-radiating, non-pleuritic. Not provoked by anything in particular. Noticed it started after he stopped taking the carafate.  Relieved by belching.  No other s/s associated with the pain. Denies SOB, dizziness, N/V.    SR 79 on EKG today. QTc 450 ms.  VSS today.  Afebrile at home.  No hemoptysis.  Bilateral groin sites are uncomplicated.  No bleeding diatheses on eliquis.  BP well controlled.    Past Medical History:   Diagnosis Date   • Arthritis     all over   • Benign hypertensive heart disease without heart failure     Disabling from his career as a , occupation related.    • Breath shortness    • Chest pain, unspecified     ~[place] [facility]   • Chronic anticoagulation 11/11/2016   • Cold 01/14/2018   • Diastolic dysfunction    • Headache, chronic daily    • Hemorrhagic disorder (CMS-HCC)     Eliquis   • High cholesterol    • Hypertension    • MR (mitral regurgitation)    • Osteoarthritis    • Paroxysmal atrial fibrillation (CMS-HCC) 2/28/2017   • Pure hypercholesterolemia     [facility]   • RLS (restless legs syndrome)      Past Surgical History:   Procedure Laterality Date   • " CERVICAL DISK AND FUSION ANTERIOR      2 level   • GASTRIC BYPASS LAPAROSCOPIC      With Daron-en-Y   • KNEE ARTHROPLASTY TOTAL Bilateral    • OTHER ORTHOPEDIC SURGERY  2007 note;    Bilateral, with musculoskelatal limitations     Family History   Problem Relation Age of Onset   • Hypertension Mother    • Cancer Father      Esophageal   • Cancer Brother      Brain   • Hypertension Brother      History   Smoking Status   • Never Smoker   Smokeless Tobacco   • Never Used     No Known Allergies  Outpatient Encounter Prescriptions as of 3/6/2018   Medication Sig Dispense Refill   • dofetilide (TIKOSYN) 250 MCG Cap Take 1 Cap by mouth 2 times a day. 60 Cap 1   • furosemide (LASIX) 20 MG Tab Take 1 Tab by mouth every day. 90 Tab 0   • naproxen (NAPROSYN) 500 MG Tab as needed. Caution when using while taking Eliquis.     • magnesium oxide (MAG-OX) 400 (241.3 Mg) MG Tab tablet Take 1 Tab by mouth every day. 30 Tab 3   • metoprolol SR (TOPROL XL) 100 MG TABLET SR 24 HR Take 50 mg by mouth 2 Times a Day.     • potassium chloride SA (K-DUR) 10 MEQ Tab CR Take 2 Tabs by mouth 2 times a day. 120 Tab 11   • apixaban (ELIQUIS) 5mg Tab Take 1 Tab by mouth 2 Times a Day. 60 Tab 11   • baclofen (LIORESAL) 10 MG Tab Take 10 mg by mouth 4 times a day as needed (pain).     • Prenatal Vit-Fe Fumarate-FA (RE PRENATAL MULTIVITAMIN/IRON PO) Take 1 tablet by mouth 2 Times a Day.     • Omega-3 Fatty Acids (FISH OIL) 1000 MG Cap capsule Take 2,000 mg by mouth 2 Times a Day.     • cyanocobalamin (VITAMIN B-12) 500 MCG TABS Take 500 mcg by mouth every day.     • omeprazole (PRILOSEC) 20 MG CPDR Take 20 mg by mouth 2 times a day.     • tramadol (ULTRAM) 50 MG TABS Take  mg by mouth every four hours as needed for Severe Pain.     • ascorbic acid (ASCORBIC ACID) 500 MG TABS Take 500 mg by mouth 2 Times a Day.     • Calcium Citrate (CITRACAL PO) Take 2 Tabs by mouth 2 Times a Day.     • valsartan (DIOVAN) 160 MG TABS Take 160 mg by mouth 2  "times a day.     • amlodipine (NORVASC) 10 MG TABS Take 10 mg by mouth every day.     • [DISCONTINUED] dofetilide (TIKOSYN) 500 MCG Cap Take 1 Cap by mouth every 12 hours. 60 Cap 3     No facility-administered encounter medications on file as of 3/6/2018.      Review of Systems   Constitutional: Positive for malaise/fatigue. Negative for chills, diaphoresis and fever.   HENT: Negative for nosebleeds.    Respiratory: Negative for cough, hemoptysis, sputum production, shortness of breath and wheezing.    Cardiovascular: Positive for chest pain (gas pains?). Negative for palpitations, orthopnea, leg swelling and PND.   Gastrointestinal: Negative for abdominal pain, blood in stool, constipation, diarrhea, heartburn, melena, nausea and vomiting.   Genitourinary: Negative for dysuria, frequency, hematuria and urgency.   Neurological: Negative for dizziness, sensory change, speech change, focal weakness, weakness and headaches.   Endo/Heme/Allergies: Does not bruise/bleed easily.   Psychiatric/Behavioral: Positive for depression. The patient is nervous/anxious. The patient does not have insomnia.    All other systems reviewed and are negative.     Objective:   /70   Pulse 82   Ht 1.854 m (6' 1\")   Wt 124.3 kg (274 lb)   SpO2 93%   BMI 36.15 kg/m²     Physical Exam   Constitutional: He is oriented to person, place, and time. He appears well-developed and well-nourished. No distress.   HENT:   Head: Normocephalic and atraumatic.   Eyes: EOM are normal. Pupils are equal, round, and reactive to light. Right eye exhibits no discharge. Left eye exhibits no discharge. No scleral icterus.   Neck: Normal range of motion. Neck supple. No JVD present.   Cardiovascular: Normal rate, regular rhythm, normal heart sounds and intact distal pulses.  Exam reveals no gallop and no friction rub.    No murmur heard.  SR 79   Pulmonary/Chest: Effort normal and breath sounds normal. No stridor. No respiratory distress. He has no " wheezes. He has no rales. He exhibits no tenderness.   Abdominal: Soft. Bowel sounds are normal. He exhibits no distension. There is no tenderness. There is no rebound and no guarding.   Musculoskeletal: Normal range of motion. He exhibits no edema.   Neurological: He is alert and oriented to person, place, and time.   Skin: Skin is warm and dry. No rash noted. He is not diaphoretic. No erythema. No pallor.   Psychiatric: He has a normal mood and affect. His behavior is normal. Judgment and thought content normal.   Nursing note and vitals reviewed.    Assessment:     1. Atrial fibrillation, unspecified type (CMS-[facility])  EKG   2. High risk medication use  MAGNESIUM    POTASSIUM SERUM (K)   3. Chronic anticoagulation       Medical Decision Making:  Today's Assessment / Status / Plan:     Will start weaning tikosyn per Dr. Reddy' previous notes to do so in the outpatient setting. Decreased tikosyn to 250 mcg BID today.    Continue eliquis for AC, will need for a min of 3 months post-procedure.    Recheck Mg & BMP since on supplementation.    BP well controlled.  Continue current antihypertensive regime.    For his chest discomfort, the patient would like to try taking the carafate for a few days longer to see if that helps.  If effective, I advised him to contact his PCP for further GI workup.    Offered a counseling referral for his depression/anxiety.  He has previously seen a counselor that he liked & would like to research if he is still in practice or not. He will let us know & we can provide a referral at that time.    RTC in 1 month.  Will continue to wean tikosyn as tolerated.      Collaborating MD:  Dr. Grey Ashley

## 2018-03-07 LAB — EKG IMPRESSION: NORMAL

## 2018-03-20 DIAGNOSIS — I48.0 PAF (PAROXYSMAL ATRIAL FIBRILLATION) (HCC): ICD-10-CM

## 2018-04-05 ENCOUNTER — TELEPHONE (OUTPATIENT)
Dept: CARDIOLOGY | Facility: MEDICAL CENTER | Age: 68
End: 2018-04-05

## 2018-04-05 NOTE — TELEPHONE ENCOUNTER
Patient has upcoming appointment on 4/12/2018 with RT called pt and he had labs done at the [facility], called and they have results will send to fax #[phone].

## 2018-04-19 RX ORDER — METOPROLOL SUCCINATE 100 MG/1
50 TABLET, EXTENDED RELEASE ORAL 2 TIMES DAILY
Qty: 90 TAB | Refills: 0 | Status: SHIPPED | OUTPATIENT
Start: 2018-04-19 | End: 2018-10-15 | Stop reason: SDUPTHER

## 2018-05-09 ENCOUNTER — OFFICE VISIT (OUTPATIENT)
Dept: CARDIOLOGY | Facility: MEDICAL CENTER | Age: 68
End: 2018-05-09
Payer: COMMERCIAL

## 2018-05-09 VITALS
SYSTOLIC BLOOD PRESSURE: 134 MMHG | HEART RATE: 72 BPM | WEIGHT: 274 LBS | OXYGEN SATURATION: 95 % | DIASTOLIC BLOOD PRESSURE: 92 MMHG | BODY MASS INDEX: 36.31 KG/M2 | HEIGHT: 73 IN

## 2018-05-09 DIAGNOSIS — I11.9 BENIGN HYPERTENSIVE HEART DISEASE WITHOUT HEART FAILURE: ICD-10-CM

## 2018-05-09 DIAGNOSIS — I34.0 MITRAL VALVE INSUFFICIENCY, UNSPECIFIED ETIOLOGY: ICD-10-CM

## 2018-05-09 DIAGNOSIS — Z79.899 HIGH RISK MEDICATION USE: Chronic | ICD-10-CM

## 2018-05-09 DIAGNOSIS — E78.00 HYPERCHOLESTEROLEMIA: ICD-10-CM

## 2018-05-09 DIAGNOSIS — I48.19 PERSISTENT ATRIAL FIBRILLATION (HCC): Chronic | ICD-10-CM

## 2018-05-09 DIAGNOSIS — R60.0 LOCALIZED EDEMA: ICD-10-CM

## 2018-05-09 LAB — EKG IMPRESSION: NORMAL

## 2018-05-09 PROCEDURE — 93000 ELECTROCARDIOGRAM COMPLETE: CPT | Mod: 29 | Performed by: NURSE PRACTITIONER

## 2018-05-09 PROCEDURE — 99214 OFFICE O/P EST MOD 30 MIN: CPT | Mod: 29 | Performed by: NURSE PRACTITIONER

## 2018-05-09 RX ORDER — POTASSIUM CHLORIDE 750 MG/1
20 TABLET, EXTENDED RELEASE ORAL DAILY
Qty: 30 TAB | Refills: 3 | Status: SHIPPED | OUTPATIENT
Start: 2018-05-09 | End: 2019-04-18 | Stop reason: SDUPTHER

## 2018-05-09 RX ORDER — DOFETILIDE 0.25 MG/1
250 CAPSULE ORAL 2 TIMES DAILY
Qty: 60 CAP | Refills: 3 | Status: SHIPPED | OUTPATIENT
Start: 2018-05-09 | End: 2018-11-05

## 2018-05-09 ASSESSMENT — ENCOUNTER SYMPTOMS
PALPITATIONS: 0
COUGH: 0
HEADACHES: 0
FALLS: 0
WEAKNESS: 0
ORTHOPNEA: 0
SPUTUM PRODUCTION: 0
DOUBLE VISION: 0
SHORTNESS OF BREATH: 0
BLURRED VISION: 0
WHEEZING: 0
FOCAL WEAKNESS: 0
LOSS OF CONSCIOUSNESS: 0
DIZZINESS: 0

## 2018-05-09 NOTE — PROGRESS NOTES
Chief Complaint   Patient presents with   • Atrial Fibrillation       Subjective:   Warren Templeton is a 68 y.o. male who presents today for follow atrial fibrillation.    He is patient of Dr. Magana in our clinic, HX: a-fib ablation done 2/12/18 by Dr. Reddy, hypertension, hyperlipidemia, and mitral regurgitation.    Patient has been doing well with no complaints.  He resides in [place], [place].    She has had no episodes of chest pain, palpitations, dizziness/lightheadedness, shortness of breath, orthopnea, or peripheral edema.     Past Medical History:   Diagnosis Date   • Arthritis     all over   • Benign hypertensive heart disease without heart failure     Disabling from his career as a , occupation related.    • Breath shortness    • Chest pain, unspecified     ~[facility]   • Chronic anticoagulation 11/11/2016   • Cold 01/14/2018   • Diastolic dysfunction    • Headache, chronic daily    • Hemorrhagic disorder (HCC)     Eliquis   • High cholesterol    • Hypertension    • MR (mitral regurgitation)    • Osteoarthritis    • Paroxysmal atrial fibrillation (HCC) 2/28/2017   • Pure hypercholesterolemia     [facility]   • RLS (restless legs syndrome)      Past Surgical History:   Procedure Laterality Date   • CERVICAL DISK AND FUSION ANTERIOR      2 level   • GASTRIC BYPASS LAPAROSCOPIC      With Daron-en-Y   • KNEE ARTHROPLASTY TOTAL Bilateral    • OTHER ORTHOPEDIC SURGERY  2007 note;    Bilateral, with musculoskelatal limitations     Family History   Problem Relation Age of Onset   • Hypertension Mother    • Cancer Father      Esophageal   • Cancer Brother      Brain   • Hypertension Brother      Social History     Social History   • Marital status:      Spouse name: N/A   • Number of children: N/A   • Years of education: N/A     Occupational History   • Not on file.     Social History Main Topics   • Smoking status: Never Smoker   • Smokeless  tobacco: Never Used   • Alcohol use Yes      Comment: 2-3X WEEK   • Drug use: No   • Sexual activity: Not on file     Other Topics Concern   • Not on file     Social History Narrative   • No narrative on file     Allergies   Allergen Reactions   • Magnesium Oxide Rash     Sores on scalp, rash on head&body, itchy anus     Outpatient Encounter Prescriptions as of 5/9/2018   Medication Sig Dispense Refill   • potassium chloride SA (K-DUR) 10 MEQ Tab CR Take 2 Tabs by mouth every day. 30 Tab 3   • dofetilide (TIKOSYN) 250 MCG Cap Take 1 Cap by mouth 2 times a day. 60 Cap 3   • furosemide (LASIX) 20 MG Tab TAKE 1 TAB BY MOUTH EVERY DAY. 30 Tab 1   • metoprolol SR (TOPROL XL) 100 MG TABLET SR 24 HR Take 0.5 Tabs by mouth 2 Times a Day. 90 Tab 0   • apixaban (ELIQUIS) 5mg Tab Take 1 Tab by mouth 2 Times a Day. 60 Tab 11   • Prenatal Vit-Fe Fumarate-FA (RE PRENATAL MULTIVITAMIN/IRON PO) Take 1 tablet by mouth 2 Times a Day.     • Omega-3 Fatty Acids (FISH OIL) 1000 MG Cap capsule Take 2,000 mg by mouth 2 Times a Day.     • cyanocobalamin (VITAMIN B-12) 500 MCG TABS Take 500 mcg by mouth every day.     • omeprazole (PRILOSEC) 20 MG CPDR Take 20 mg by mouth 2 times a day.     • ascorbic acid (ASCORBIC ACID) 500 MG TABS Take 500 mg by mouth 2 Times a Day.     • Calcium Citrate (CITRACAL PO) Take 2 Tabs by mouth 2 Times a Day.     • valsartan (DIOVAN) 160 MG TABS Take 160 mg by mouth 2 times a day.     • amlodipine (NORVASC) 10 MG TABS Take 10 mg by mouth every day.     • [DISCONTINUED] dofetilide (TIKOSYN) 250 MCG Cap TAKE 1 CAP BY MOUTH 2 TIMES A DAY. 60 Cap 1   • [DISCONTINUED] dofetilide (TIKOSYN) 250 MCG Cap Take 1 Cap by mouth 2 times a day. 60 Cap 1   • naproxen (NAPROSYN) 500 MG Tab as needed. Caution when using while taking Eliquis.     • [DISCONTINUED] magnesium oxide (MAG-OX) 400 (241.3 Mg) MG Tab tablet Take 1 Tab by mouth every day. 30 Tab 3   • [DISCONTINUED] potassium chloride SA (K-DUR) 10 MEQ Tab CR Take 2 Tabs  "by mouth 2 times a day. 120 Tab 11   • baclofen (LIORESAL) 10 MG Tab Take 10 mg by mouth 4 times a day as needed (pain).     • tramadol (ULTRAM) 50 MG TABS Take  mg by mouth every four hours as needed for Severe Pain.       No facility-administered encounter medications on file as of 5/9/2018.      Review of Systems   Constitutional: Negative for malaise/fatigue.   Eyes: Negative for blurred vision and double vision.   Respiratory: Negative for cough, sputum production, shortness of breath and wheezing.    Cardiovascular: Negative for chest pain, palpitations, orthopnea and leg swelling.   Musculoskeletal: Negative for falls.   Neurological: Negative for dizziness, focal weakness, loss of consciousness, weakness and headaches.   All other systems reviewed and are negative.       Objective:   /92   Pulse 72   Ht 1.854 m (6' 1\")   Wt 124.3 kg (274 lb)   SpO2 95%   BMI 36.15 kg/m²     Physical Exam   Constitutional: He is oriented to person, place, and time. He appears well-developed and well-nourished. No distress.   HENT:   Head: Normocephalic and atraumatic.   Eyes: Pupils are equal, round, and reactive to light.   Neck: No JVD present.   Cardiovascular: Normal rate, regular rhythm and normal heart sounds.    Pulmonary/Chest: Effort normal and breath sounds normal.   Abdominal: Soft. Bowel sounds are normal. He exhibits no distension.   Musculoskeletal: He exhibits no edema.   Neurological: He is alert and oriented to person, place, and time.   Skin: Skin is warm and dry. He is not diaphoretic.   Psychiatric: He has a normal mood and affect. His behavior is normal. Judgment and thought content normal.       Echocardiography Laboratory  7/11/2016  Normal left ventricular size and systolic function.  Mild concentric left ventricular hypertrophy.  Left ventricular ejection fraction is visually estimated to be 65%.  Severely dilated left atrium.  Mitral annular calcification.  Mild mitral " regurgitation.  Mild tricuspid regurgitation.  Estimated right ventricular systolic pressure  is 30 mmHg.  Moderately dilated right ventricle.    [facility]  Cholesterol 153  Triglyceride 111  HDL 38  LDL 97    Sodium 139  Potassium 4.6  Creatinine 1.1  GFR 67      Assessment:     1. Persistent atrial fibrillation (HCC)  [facility] EKG (Clinic Performed)    COMP METABOLIC PANEL    MAGNESIUM    dofetilide (TIKOSYN) 250 MCG Cap   2. High risk medication use  COMP METABOLIC PANEL    MAGNESIUM    dofetilide (TIKOSYN) 250 MCG Cap   3. Benign hypertensive heart disease without heart failure  [facility] EKG (Clinic Performed)   4. Hypercholesterolemia     5. Mitral valve insufficiency, unspecified etiology     6. Localized edema  potassium chloride SA (K-DUR) 10 MEQ Tab CR       Medical Decision Making:  Today's Assessment / Status / Plan:     1. Atrial fibrilation:  - EKG today showed NSR, QTc: 449  - creatinine 1.1 stable and k 4.6  - continue toprol XL 50mg BID and continue eliquis 5mg BID     2. High risk medication use:  - continue tikosyn 250 mcg BID , QTc stable  - Recheck Mg & CMP, patient was having rash with magnesium and stopped taking the supplements     3. Hypertension:  - stable, a bit elevated today.   - continue amlodipine 10 mg daily, metoprolol 50 mg twice daily, and furosemide 20 mg daily    4. Hyperlipidemia:  - LDL 97, goal less than 100    5. Mitral regurgitation:  - ECHO: showed mild     Follow up in 3 months with Dr. Mancini, CMP and Magnesium prior to next appt.    Collaborating Provider: Dr. Bianchi

## 2018-07-03 DIAGNOSIS — I50.9 CHF (CONGESTIVE HEART FAILURE), NYHA CLASS I, UNSPECIFIED FAILURE CHRONICITY, UNSPECIFIED TYPE (HCC): ICD-10-CM

## 2018-07-03 RX ORDER — FUROSEMIDE 20 MG/1
20 TABLET ORAL DAILY
Qty: 90 TAB | Refills: 1 | Status: SHIPPED | OUTPATIENT
Start: 2018-07-03 | End: 2018-12-12 | Stop reason: SDUPTHER

## 2018-07-10 ENCOUNTER — TELEPHONE (OUTPATIENT)
Dept: CARDIOLOGY | Facility: MEDICAL CENTER | Age: 68
End: 2018-07-10

## 2018-07-12 ENCOUNTER — TELEPHONE (OUTPATIENT)
Dept: CARDIOLOGY | Facility: MEDICAL CENTER | Age: 68
End: 2018-07-12

## 2018-07-12 NOTE — TELEPHONE ENCOUNTER
Called patient regarding labs  Will mail labs to patient   Will get labs done before appt with Dr. Mancini on 8/7/18

## 2018-07-18 ENCOUNTER — TELEPHONE (OUTPATIENT)
Dept: CARDIOLOGY | Facility: MEDICAL CENTER | Age: 68
End: 2018-07-18

## 2018-07-18 NOTE — TELEPHONE ENCOUNTER
----- Message from Shanita Mcfarlane sent at 7/18/2018 12:00 PM PDT -----  Regarding: question about his lab results  RT/Manda      [facility]. He is asking if his lab results show if he was ever tested anemia. He can be reached at [phone].

## 2018-07-30 ENCOUNTER — TELEPHONE (OUTPATIENT)
Dept: CARDIOLOGY | Facility: MEDICAL CENTER | Age: 68
End: 2018-07-30

## 2018-07-30 NOTE — TELEPHONE ENCOUNTER
Spoke with pt. He has [facility] 8-7-18 with SS and had labs drawn today ordered by Yanique in May. Reassured pt. That lab didn't require fasting.

## 2018-07-30 NOTE — TELEPHONE ENCOUNTER
----- Message from Cate Coates sent at 7/30/2018  1:40 PM PDT -----  Regarding: Patient at lab now and question about fasting or not  WALTER/Rody    Patient is at the lab now and needs to find out if the lab is fasting or not. He wants a call back asap and can be reached at [phone].

## 2018-07-31 DIAGNOSIS — I48.19 PERSISTENT ATRIAL FIBRILLATION (HCC): Chronic | ICD-10-CM

## 2018-07-31 DIAGNOSIS — Z79.899 HIGH RISK MEDICATION USE: Chronic | ICD-10-CM

## 2018-10-15 DIAGNOSIS — I11.9 BENIGN HYPERTENSIVE HEART DISEASE WITHOUT HEART FAILURE: ICD-10-CM

## 2018-10-15 RX ORDER — METOPROLOL SUCCINATE 50 MG/1
50 TABLET, EXTENDED RELEASE ORAL 2 TIMES DAILY
Qty: 180 TAB | Refills: 3 | Status: SHIPPED | OUTPATIENT
Start: 2018-10-15 | End: 2019-03-05 | Stop reason: SDUPTHER

## 2018-10-31 ENCOUNTER — OFFICE VISIT (OUTPATIENT)
Dept: CARDIOLOGY | Facility: MEDICAL CENTER | Age: 68
End: 2018-10-31
Payer: COMMERCIAL

## 2018-10-31 VITALS
DIASTOLIC BLOOD PRESSURE: 84 MMHG | HEIGHT: 73 IN | WEIGHT: 282 LBS | RESPIRATION RATE: 14 BRPM | BODY MASS INDEX: 37.37 KG/M2 | SYSTOLIC BLOOD PRESSURE: 132 MMHG | OXYGEN SATURATION: 94 % | HEART RATE: 66 BPM

## 2018-10-31 DIAGNOSIS — Z79.899 HIGH RISK MEDICATION USE: Chronic | ICD-10-CM

## 2018-10-31 DIAGNOSIS — Z79.01 CHRONIC ANTICOAGULATION: Chronic | ICD-10-CM

## 2018-10-31 DIAGNOSIS — G47.30 SLEEP APNEA, UNSPECIFIED TYPE: ICD-10-CM

## 2018-10-31 DIAGNOSIS — I48.19 PERSISTENT ATRIAL FIBRILLATION (HCC): Chronic | ICD-10-CM

## 2018-10-31 DIAGNOSIS — Z51.81 VISIT FOR MONITORING TIKOSYN THERAPY: ICD-10-CM

## 2018-10-31 DIAGNOSIS — Z79.899 VISIT FOR MONITORING TIKOSYN THERAPY: ICD-10-CM

## 2018-10-31 LAB — EKG IMPRESSION: NORMAL

## 2018-10-31 PROCEDURE — 99215 OFFICE O/P EST HI 40 MIN: CPT | Performed by: INTERNAL MEDICINE

## 2018-10-31 PROCEDURE — 93000 ELECTROCARDIOGRAM COMPLETE: CPT | Performed by: INTERNAL MEDICINE

## 2018-10-31 NOTE — PROGRESS NOTES
Chief Complaint   Patient presents with   • Atrial Fibrillation     Persistent AIFB.       Subjective:   Warren Templeton is a 68 y.o. male who presents today for our initial visit and follow-up of atrial fibrillation status post atrial for ablation ablation with PVI by Dr. Reddy 2/12/2018 currently managed with oral anticoagulation in the form of Eliquis that he is tolerating well and antiarrhythmic therapy with dofetilide also tolerated well.  Has not had any recurrence of symptomatic atrial fibrillation since his ablation and was maintained on dofetilide prior to and subsequently.  He would like to discontinue the dofetilide as he indicates this was 1 of his motivations for undergoing the ablation in the first place.  Otherwise he does not perform routine physical activity but is active in his activities of daily living and has no chest discomfort or other symptoms.    Denies any other cardiovascular symptoms including chest pain, shortness of breath, dyspnea on exertion, lightheadedness, syncope or presyncope, lower extremity edema, PND, orthopnea or palpitations.      Past Medical History:   Diagnosis Date   • Arthritis     all over   • Benign hypertensive heart disease without heart failure     Disabling from his career as a , occupation related.    • Breath shortness    • Chest pain, unspecified     ~2000 with negative cardiac cath by [facility]   • Chronic anticoagulation 11/11/2016   • Cold 01/14/2018   • Diastolic dysfunction    • Headache, chronic daily    • Hemorrhagic disorder (HCC)     Eliquis   • High cholesterol    • Hypertension    • MR (mitral regurgitation)    • Osteoarthritis    • Paroxysmal atrial fibrillation (HCC) 2/28/2017   • Pure hypercholesterolemia     [facility]   • RLS (restless legs syndrome)      Past Surgical History:   Procedure Laterality Date   • CERVICAL DISK AND FUSION ANTERIOR      2 level   • GASTRIC BYPASS LAPAROSCOPIC      With Daron-en-Y   • KNEE  ARTHROPLASTY TOTAL Bilateral    • OTHER ORTHOPEDIC SURGERY  2007 note;    Bilateral, with musculoskelatal limitations     Family History   Problem Relation Age of Onset   • Hypertension Mother    • Cancer Father         Esophageal   • Cancer Brother         Brain   • Hypertension Brother      Social History     Social History   • Marital status:      Spouse name: N/A   • Number of children: N/A   • Years of education: N/A     Occupational History   • Not on file.     Social History Main Topics   • Smoking status: Never Smoker   • Smokeless tobacco: Never Used   • Alcohol use Yes      Comment: 2-3X WEEK   • Drug use: No   • Sexual activity: Not on file     Other Topics Concern   • Not on file     Social History Narrative   • No narrative on file     Allergies   Allergen Reactions   • Magnesium Oxide Rash     Sores on scalp, rash on head&body, itchy anus     Outpatient Encounter Prescriptions as of 10/31/2018   Medication Sig Dispense Refill   • Multiple Vitamins-Minerals (OPTISOURCE POST BARIATRIC SURG PO) Take 1 Tab by mouth 2 Times a Day.     • metoprolol SR (TOPROL XL) 50 MG TABLET SR 24 HR Take 1 Tab by mouth 2 Times a Day. 180 Tab 3   • furosemide (LASIX) 20 MG Tab Take 1 Tab by mouth every day. 90 Tab 1   • potassium chloride SA (K-DUR) 10 MEQ Tab CR Take 2 Tabs by mouth every day. 30 Tab 3   • dofetilide (TIKOSYN) 250 MCG Cap Take 1 Cap by mouth 2 times a day. 60 Cap 3   • apixaban (ELIQUIS) 5mg Tab Take 1 Tab by mouth 2 Times a Day. 60 Tab 11   • naproxen (NAPROSYN) 500 MG Tab Take 500 mg by mouth as needed. Caution when using while taking Eliquis.     • baclofen (LIORESAL) 10 MG Tab Take 10 mg by mouth 4 times a day as needed (pain).     • Omega-3 Fatty Acids (FISH OIL) 1000 MG Cap capsule Take 2,000 mg by mouth 2 Times a Day.     • cyanocobalamin (VITAMIN B-12) 500 MCG TABS Take 500 mcg by mouth every day.     • omeprazole (PRILOSEC) 20 MG CPDR Take 20 mg by mouth 2 times a day.     • tramadol  "(ULTRAM) 50 MG TABS Take  mg by mouth every four hours as needed for Severe Pain.     • ascorbic acid (ASCORBIC ACID) 500 MG TABS Take 500 mg by mouth 2 Times a Day.     • Calcium Citrate (CITRACAL PO) Take 2 Tabs by mouth 2 Times a Day.     • valsartan (DIOVAN) 160 MG TABS Take 160 mg by mouth 2 times a day.     • amlodipine (NORVASC) 10 MG TABS Take 10 mg by mouth every day.     • Prenatal Vit-Fe Fumarate-FA (RE PRENATAL MULTIVITAMIN/IRON PO) Take 1 tablet by mouth 2 Times a Day.       No facility-administered encounter medications on file as of 10/31/2018.      Review of Systems   All other systems reviewed and are negative.       Objective:   /84 (BP Location: Left arm, Patient Position: Sitting, BP Cuff Size: Adult)   Pulse 66   Resp 14   Ht 1.854 m (6' 1\")   Wt (!) 127.9 kg (282 lb)   SpO2 94%   BMI 37.21 kg/m²     Physical Exam   Constitutional: He is oriented to person, place, and time. He appears well-developed and well-nourished. No distress.   Obese   HENT:   Head: Normocephalic and atraumatic.   Right Ear: External ear normal.   Left Ear: External ear normal.   Eyes: Pupils are equal, round, and reactive to light. Conjunctivae and EOM are normal. Right eye exhibits no discharge. Left eye exhibits no discharge. No scleral icterus.   Neck: Normal range of motion. Neck supple. No JVD present. No tracheal deviation present. No thyromegaly present.   Cardiovascular: Normal rate, regular rhythm and intact distal pulses.   Occasional extrasystoles are present. PMI is not displaced.  Exam reveals no gallop and no friction rub.    No murmur heard.  Pulses:       Carotid pulses are 2+ on the right side, and 2+ on the left side.       Radial pulses are 2+ on the left side.        Popliteal pulses are 2+ on the right side, and 2+ on the left side.        Dorsalis pedis pulses are 2+ on the right side, and 2+ on the left side.        Posterior tibial pulses are 2+ on the right side, and 2+ on the " left side.   Pulmonary/Chest: Effort normal and breath sounds normal. No respiratory distress. He has no wheezes. He has no rales. He exhibits no tenderness.   Abdominal: Soft. Bowel sounds are normal. He exhibits no distension. There is no tenderness.   Musculoskeletal: Normal range of motion. He exhibits no edema, tenderness or deformity.   Neurological: He is alert and oriented to person, place, and time. No cranial nerve deficit (cranial nerves II through XII grossly intact). Coordination normal.   Skin: Skin is warm and dry. No rash noted. He is not diaphoretic. No erythema. No pallor.   Psychiatric: He has a normal mood and affect. His behavior is normal. Thought content normal.   Vitals reviewed.    LABS:  No results found for: CHOLSTRLTOT, LDL, HDL, TRIGLYCERIDE    Lab Results   Component Value Date/Time    WBC 5.5 02/12/2018 12:05 PM    RBC 4.29 (L) 02/12/2018 12:05 PM    HEMOGLOBIN 12.4 (L) 02/12/2018 12:05 PM    HEMATOCRIT 37.4 (L) 02/12/2018 12:05 PM    MCV 87.2 02/12/2018 12:05 PM    NEUTSPOLYS 55.10 02/12/2018 12:05 PM    LYMPHOCYTES 31.40 02/12/2018 12:05 PM    MONOCYTES 10.20 02/12/2018 12:05 PM    EOSINOPHILS 2.40 02/12/2018 12:05 PM    BASOPHILS 0.50 02/12/2018 12:05 PM     Lab Results   Component Value Date/Time    SODIUM 138 02/12/2018 12:05 PM    POTASSIUM 4.3 02/12/2018 12:05 PM    CHLORIDE 105 02/12/2018 12:05 PM    CO2 26 02/12/2018 12:05 PM    GLUCOSE 93 02/12/2018 12:05 PM    BUN 21 02/12/2018 12:05 PM    CREATININE 0.96 02/12/2018 12:05 PM         Lab Results   Component Value Date/Time    ALKPHOSPHAT 55 02/12/2018 12:05 PM    ASTSGOT 21 02/12/2018 12:05 PM    ALTSGPT 16 02/12/2018 12:05 PM    TBILIRUBIN 0.6 02/12/2018 12:05 PM      No results found for: BNPBTYPENAT   No results found for: TSH  Lab Results   Component Value Date/Time    PROTHROMBTM 15.0 (H) 02/12/2018 12:05 PM    INR 1.21 (H) 02/12/2018 12:05 PM      EKG (10/31/2018 ):  I have personally reviewed the EKG this visit and  discussed with the patient.  Results for orders placed or performed in visit on 18   [facility] EKG (Clinic Performed)   Result Value Ref Range    Report       [facility] [place] [facility]    Test Date:  2018  Pt Name:    GABRIELA MOSCOSO              Department: [facility]  MRN:        [id number]                      Room:  Gender:     Male                         Technician: SONI  :        1950                   Requested By:AMELIA YIP  Order #:    [id number]                    Reading MD: Noa Santiago MD    Measurements  Intervals                                Axis  Rate:       70                           P:          4  MI:         168                          QRS:        36  QRSD:       104                          T:          18  QT:         416  QTc:        449    Interpretive Statements  SINUS RHYTHM  BORDERLINE LOW VOLTAGE IN FRONTAL LEADS  PROBABLE POSTERIOR INFARCT  Compared to ECG 2018 13:21:41  Myocardial infarct finding now present  T-wave abnormality no longer present    Electronically Signed On 2018 15:10:52 PDT by Noa Santiago MD         ECHO CONCLUSIONS (2016):  Compared to the images of the prior study done on 12, no   significant change    Normal left ventricular size and systolic function.  Mild concentric left ventricular hypertrophy.  Left ventricular ejection fraction is visually estimated to be 65%.  Severely dilated left atrium.  Mitral annular calcification.  Mild mitral regurgitation.  Mild tricuspid regurgitation.  Estimated right ventricular systolic pressure  is 30 mmHg.  Moderately dilated right ventricle.    Assessment:     1. Visit for monitoring Tikosyn therapy  EKG   2. Persistent atrial fibrillation (HCC)     3. Chronic anticoagulation     4. High risk medication use     5. Sleep apnea, unspecified type         Medical Decision Making:  Today's Assessment / Status / Plan:     Overall he is doing well.  ECG looks reasonable on  dofetilide.  Echocardiogram shows mild mitral valvular dysfunction as of 2016.  His oral anticoagulations well tolerated.  He has normal renal function.  We had a long discussion regarding his high risk medication in the form of dofetilide as well as risks and benefits of stopping or continuing it.  After this he seems to understand the rationale for continuing and/or discontinuing.  Following this complex discussion he would like a trial off the dofetilide knowing that indeed he may have a recurrence of atrial fibrillation.  He will continue his oral anticoagulation for stroke prevention and may require routine echocardiography and renal function monitoring.    1.  Discontinue dofetilide after medically complex discussions had above including the real risk of sudden death  2.  Continue oral anticoagulation  3.  Follow-up in 3-6 months with a renal function evaluation.  He also need routine echocardiography to follow-up his mitral valve dysfunction.  4.  Continue other medical therapy.  5.  He was instructed to check his heart rhythm and heart rate several times per week.    FU3-6M

## 2018-11-05 ENCOUNTER — TELEPHONE (OUTPATIENT)
Dept: CARDIOLOGY | Facility: MEDICAL CENTER | Age: 68
End: 2018-11-05

## 2018-11-05 NOTE — TELEPHONE ENCOUNTER
Called pt and confirmed he has stopped taking his Dofetilide and does not need this refilled at this time. Medication removed from MAR.

## 2018-12-12 DIAGNOSIS — I50.9 CHF (CONGESTIVE HEART FAILURE), NYHA CLASS I, UNSPECIFIED FAILURE CHRONICITY, UNSPECIFIED TYPE (HCC): ICD-10-CM

## 2018-12-12 RX ORDER — FUROSEMIDE 20 MG/1
20 TABLET ORAL DAILY
Qty: 90 TAB | Refills: 3 | Status: SHIPPED | OUTPATIENT
Start: 2018-12-12 | End: 2019-04-18 | Stop reason: SDUPTHER

## 2019-03-05 DIAGNOSIS — I11.9 BENIGN HYPERTENSIVE HEART DISEASE WITHOUT HEART FAILURE: ICD-10-CM

## 2019-03-07 RX ORDER — METOPROLOL SUCCINATE 50 MG/1
50 TABLET, EXTENDED RELEASE ORAL 2 TIMES DAILY
Qty: 180 TAB | Refills: 3 | Status: SHIPPED | OUTPATIENT
Start: 2019-03-07 | End: 2019-04-18 | Stop reason: SDUPTHER

## 2019-03-14 ENCOUNTER — TELEPHONE (OUTPATIENT)
Dept: CARDIOLOGY | Facility: MEDICAL CENTER | Age: 69
End: 2019-03-14

## 2019-03-14 NOTE — TELEPHONE ENCOUNTER
Called and spoke with pt. He states he feels like he is in afib again. His HR feels normal, but he is feeling some skipped beats and it feels like it is jumping around. He has had no symptoms other than feeling more fatigued than normal. Still on OAC.

## 2019-03-14 NOTE — TELEPHONE ENCOUNTER
----- Message from Renetta White, Med Ass't sent at 3/13/2019  3:28 PM PDT -----  Regarding: AFIB  Contact: [phone]  TW    Pt left a vm stating he feels he went into afib & wants to know what he should do.  Ph#: [phone]

## 2019-03-22 ENCOUNTER — TELEPHONE (OUTPATIENT)
Dept: CARDIOLOGY | Facility: MEDICAL CENTER | Age: 69
End: 2019-03-22

## 2019-03-22 NOTE — TELEPHONE ENCOUNTER
----- Message from Renetta White, Med Ass't sent at 3/22/2019  2:44 PM PDT -----  Regarding: AFIB  Contact: [phone]  TW     Pt following up on phone call from 3/14 & says he thinks he's still in AFIB.  Ph#: [phone]

## 2019-03-28 DIAGNOSIS — R60.0 LOCALIZED EDEMA: ICD-10-CM

## 2019-03-29 RX ORDER — POTASSIUM CHLORIDE 750 MG/1
20 TABLET, EXTENDED RELEASE ORAL DAILY
Qty: 60 TAB | Refills: 11 | Status: SHIPPED | OUTPATIENT
Start: 2019-03-29 | End: 2019-04-18

## 2019-04-18 ENCOUNTER — OFFICE VISIT (OUTPATIENT)
Dept: CARDIOLOGY | Facility: MEDICAL CENTER | Age: 69
End: 2019-04-18
Payer: COMMERCIAL

## 2019-04-18 VITALS
BODY MASS INDEX: 36.31 KG/M2 | HEIGHT: 73 IN | HEART RATE: 66 BPM | DIASTOLIC BLOOD PRESSURE: 80 MMHG | SYSTOLIC BLOOD PRESSURE: 132 MMHG | WEIGHT: 274 LBS

## 2019-04-18 DIAGNOSIS — R60.0 LOCALIZED EDEMA: ICD-10-CM

## 2019-04-18 DIAGNOSIS — I48.0 PAF (PAROXYSMAL ATRIAL FIBRILLATION) (HCC): ICD-10-CM

## 2019-04-18 DIAGNOSIS — I11.9 BENIGN HYPERTENSIVE HEART DISEASE WITHOUT HEART FAILURE: ICD-10-CM

## 2019-04-18 DIAGNOSIS — Z79.899 HIGH RISK MEDICATION USE: Chronic | ICD-10-CM

## 2019-04-18 DIAGNOSIS — I48.0 PAROXYSMAL ATRIAL FIBRILLATION (HCC): Primary | ICD-10-CM

## 2019-04-18 DIAGNOSIS — I50.9 CHF (CONGESTIVE HEART FAILURE), NYHA CLASS I, UNSPECIFIED FAILURE CHRONICITY, UNSPECIFIED TYPE (HCC): ICD-10-CM

## 2019-04-18 LAB — EKG IMPRESSION: NORMAL

## 2019-04-18 PROCEDURE — 99214 OFFICE O/P EST MOD 30 MIN: CPT | Mod: 29 | Performed by: NURSE PRACTITIONER

## 2019-04-18 PROCEDURE — 93000 ELECTROCARDIOGRAM COMPLETE: CPT | Performed by: INTERNAL MEDICINE

## 2019-04-18 RX ORDER — VALSARTAN 160 MG/1
160 TABLET ORAL 2 TIMES DAILY
Qty: 60 TAB | Refills: 11 | Status: SHIPPED | OUTPATIENT
Start: 2019-04-18 | End: 2019-05-28 | Stop reason: SDUPTHER

## 2019-04-18 RX ORDER — AMLODIPINE BESYLATE 10 MG/1
10 TABLET ORAL DAILY
Refills: 11 | COMMUNITY
Start: 2019-02-13 | End: 2019-05-28 | Stop reason: SDUPTHER

## 2019-04-18 RX ORDER — METOPROLOL SUCCINATE 50 MG/1
50 TABLET, EXTENDED RELEASE ORAL 2 TIMES DAILY
Qty: 60 TAB | Refills: 11 | Status: SHIPPED | OUTPATIENT
Start: 2019-04-18 | End: 2019-05-28 | Stop reason: SDUPTHER

## 2019-04-18 RX ORDER — FLUOCINONIDE TOPICAL SOLUTION USP, 0.05% 0.5 MG/ML
0.05 SOLUTION TOPICAL DAILY
Refills: 3 | COMMUNITY
Start: 2019-02-07

## 2019-04-18 RX ORDER — POTASSIUM CHLORIDE 750 MG/1
20 TABLET, EXTENDED RELEASE ORAL DAILY
Qty: 60 TAB | Refills: 11
Start: 2019-04-18 | End: 2019-05-28 | Stop reason: SDUPTHER

## 2019-04-18 RX ORDER — FUROSEMIDE 20 MG/1
20 TABLET ORAL DAILY
Qty: 30 TAB | Refills: 11 | Status: SHIPPED | OUTPATIENT
Start: 2019-04-18 | End: 2019-05-28 | Stop reason: SDUPTHER

## 2019-04-18 RX ORDER — DOFETILIDE 0.25 MG/1
250 CAPSULE ORAL
COMMUNITY
End: 2019-04-18

## 2019-04-18 ASSESSMENT — ENCOUNTER SYMPTOMS
ORTHOPNEA: 0
WEAKNESS: 0
FALLS: 0
SPUTUM PRODUCTION: 0
PALPITATIONS: 0
SHORTNESS OF BREATH: 0
HEADACHES: 0
LOSS OF CONSCIOUSNESS: 0
WHEEZING: 0
BLURRED VISION: 0
DIZZINESS: 0
COUGH: 0
FOCAL WEAKNESS: 0
DOUBLE VISION: 0

## 2019-04-18 NOTE — PROGRESS NOTES
Chief Complaint   Patient presents with   • Atrial Fibrillation       Subjective:   Warren Templeton is a 68 y.o. male who presents today for follow atrial fibrillation.    He is patient of Dr. Ya in our clinic, HX: a-fib ablation with PVI 2/12/18 by Dr. Reddy, hypertension, hyperlipidemia, and mitral regurgitation.    Patient thought he went back into afib, due to his lack of energy lately and irregular pulse. He continues to take eliquis and metoprolol every day.     He has had no episodes of chest pain, dizziness/lightheadedness, shortness of breath, orthopnea, or peripheral edema.     Last OV with Dr. Ya 10/2018 patient was taken off tikosyn.    He is currently in the process of moving to [place] from [place], [place].  Past Medical History:   Diagnosis Date   • Arthritis     all over   • Benign hypertensive heart disease without heart failure     Disabling from his career as a , occupation related.    • Breath shortness    • Chest pain, unspecified     ~2000 with negative cardiac cath by [facility]   • Chronic anticoagulation 11/11/2016   • Cold 01/14/2018   • Diastolic dysfunction    • Headache, chronic daily    • Hemorrhagic disorder (HCC)     Eliquis   • High cholesterol    • Hypertension    • MR (mitral regurgitation)    • Osteoarthritis    • Paroxysmal atrial fibrillation (HCC) 2/28/2017   • Pure hypercholesterolemia     [facility]   • RLS (restless legs syndrome)      Past Surgical History:   Procedure Laterality Date   • CERVICAL DISK AND FUSION ANTERIOR      2 level   • GASTRIC BYPASS LAPAROSCOPIC      With Daron-en-Y   • KNEE ARTHROPLASTY TOTAL Bilateral    • OTHER ORTHOPEDIC SURGERY  2007 note;    Bilateral, with musculoskelatal limitations     Family History   Problem Relation Age of Onset   • Hypertension Mother    • Cancer Father         Esophageal   • Cancer Brother         Brain   • Hypertension Brother      Social History     Social History   •  Marital status:      Spouse name: N/A   • Number of children: N/A   • Years of education: N/A     Occupational History   • Not on file.     Social History Main Topics   • Smoking status: Never Smoker   • Smokeless tobacco: Never Used   • Alcohol use Yes      Comment: 2-3X WEEK   • Drug use: No   • Sexual activity: Not on file     Other Topics Concern   • Not on file     Social History Narrative   • No narrative on file     Allergies   Allergen Reactions   • Magnesium Oxide Rash     Sores on scalp, rash on head&body, itchy anus     Outpatient Encounter Prescriptions as of 4/18/2019   Medication Sig Dispense Refill   • amLODIPine (NORVASC) 10 MG Tab Take 10 mg by mouth every day.  11   • apixaban (ELIQUIS) 5mg Tab Take 1 Tab by mouth 2 Times a Day. 60 Tab 11   • furosemide (LASIX) 20 MG Tab Take 1 Tab by mouth every day. 30 Tab 11   • metoprolol SR (TOPROL XL) 50 MG TABLET SR 24 HR Take 1 Tab by mouth 2 Times a Day. 60 Tab 11   • valsartan (DIOVAN) 160 MG Tab Take 1 Tab by mouth 2 times a day. 60 Tab 11   • potassium chloride SA (K-DUR) 10 MEQ Tab CR Take 2 Tabs by mouth every day. 60 Tab 11   • Multiple Vitamins-Minerals (OPTISOURCE POST BARIATRIC SURG PO) Take 1 Tab by mouth 2 Times a Day.     • Omega-3 Fatty Acids (FISH OIL) 1000 MG Cap capsule Take 2,000 mg by mouth 2 Times a Day.     • cyanocobalamin (VITAMIN B-12) 500 MCG TABS Take 500 mcg by mouth every day.     • omeprazole (PRILOSEC) 20 MG CPDR Take 20 mg by mouth 2 times a day.     • tramadol (ULTRAM) 50 MG TABS Take  mg by mouth every four hours as needed for Severe Pain.     • ascorbic acid (ASCORBIC ACID) 500 MG TABS Take 500 mg by mouth 2 Times a Day.     • Calcium Citrate (CITRACAL PO) Take 2 Tabs by mouth 2 Times a Day.     • fluocinonide (LIDEX) 0.05 % external solution Apply 0.05 Applicators to affected area(s) every day.  3   • [DISCONTINUED] dofetilide (TIKOSYN) 250 MCG Cap Take 250 mcg by mouth 2 Times a Day.     • [DISCONTINUED]  "potassium chloride SA (K-DUR) 10 MEQ Tab CR Take 2 Tabs by mouth every day. (Patient not taking: Reported on 4/18/2019) 60 Tab 11   • [DISCONTINUED] metoprolol SR (TOPROL XL) 50 MG TABLET SR 24 HR Take 1 Tab by mouth 2 Times a Day. 180 Tab 3   • [DISCONTINUED] furosemide (LASIX) 20 MG Tab Take 1 Tab by mouth every day. 90 Tab 3   • [DISCONTINUED] potassium chloride SA (K-DUR) 10 MEQ Tab CR Take 2 Tabs by mouth every day. 30 Tab 3   • [DISCONTINUED] apixaban (ELIQUIS) 5mg Tab Take 1 Tab by mouth 2 Times a Day. 60 Tab 11   • naproxen (NAPROSYN) 500 MG Tab Take 500 mg by mouth as needed. Caution when using while taking Eliquis.     • [DISCONTINUED] baclofen (LIORESAL) 10 MG Tab Take 10 mg by mouth 4 times a day as needed (pain).     • [DISCONTINUED] Prenatal Vit-Fe Fumarate-FA (RE PRENATAL MULTIVITAMIN/IRON PO) Take 1 tablet by mouth 2 Times a Day.     • [DISCONTINUED] valsartan (DIOVAN) 160 MG TABS Take 160 mg by mouth 2 times a day.     • [DISCONTINUED] amlodipine (NORVASC) 10 MG TABS Take 10 mg by mouth every day.       No facility-administered encounter medications on file as of 4/18/2019.      Review of Systems   Constitutional: Positive for malaise/fatigue.   Eyes: Negative for blurred vision and double vision.   Respiratory: Negative for cough, sputum production, shortness of breath and wheezing.    Cardiovascular: Negative for chest pain, palpitations, orthopnea and leg swelling.   Musculoskeletal: Negative for falls.   Neurological: Negative for dizziness, focal weakness, loss of consciousness, weakness and headaches.   All other systems reviewed and are negative.       Objective:   /80 (BP Location: Left arm, Patient Position: Sitting, BP Cuff Size: Adult)   Pulse 66   Ht 1.854 m (6' 1\")   Wt 124.3 kg (274 lb)   BMI 36.15 kg/m²     Physical Exam   Constitutional: He is oriented to person, place, and time. He appears well-developed and well-nourished. No distress.   HENT:   Head: Normocephalic and " atraumatic.   Eyes: Pupils are equal, round, and reactive to light.   Neck: No JVD present.   Cardiovascular: Normal rate, regular rhythm and normal heart sounds.    Pulmonary/Chest: Effort normal and breath sounds normal.   Abdominal: Soft. Bowel sounds are normal. He exhibits no distension.   Musculoskeletal: He exhibits no edema.   Neurological: He is alert and oriented to person, place, and time.   Skin: Skin is warm and dry. He is not diaphoretic.   Psychiatric: He has a normal mood and affect. His behavior is normal. Judgment and thought content normal.       Echocardiography Laboratory  7/11/2016  Normal left ventricular size and systolic function.  Mild concentric left ventricular hypertrophy.  Left ventricular ejection fraction is visually estimated to be 65%.  Severely dilated left atrium.  Mitral annular calcification.  Mild mitral regurgitation.  Mild tricuspid regurgitation.  Estimated right ventricular systolic pressure  is 30 mmHg.  Moderately dilated right ventricle.    [facility]  Cholesterol 153  Triglyceride 111  HDL 38  LDL 97    Sodium 139  Potassium 4.6  Creatinine 1.1  GFR 67      Assessment:     1. Paroxysmal atrial fibrillation (HCC)  EKG    apixaban (ELIQUIS) 5mg Tab    furosemide (LASIX) 20 MG Tab    metoprolol SR (TOPROL XL) 50 MG TABLET SR 24 HR    valsartan (DIOVAN) 160 MG Tab    Comp Metabolic Panel    potassium chloride SA (K-DUR) 10 MEQ Tab CR   2. Benign hypertensive heart disease without heart failure  apixaban (ELIQUIS) 5mg Tab    furosemide (LASIX) 20 MG Tab    metoprolol SR (TOPROL XL) 50 MG TABLET SR 24 HR    valsartan (DIOVAN) 160 MG Tab    Comp Metabolic Panel    potassium chloride SA (K-DUR) 10 MEQ Tab CR   3. High risk medication use     4. PAF (paroxysmal atrial fibrillation) ([facility])     5. CHF (congestive heart failure), NYHA class I, unspecified failure chronicity, unspecified type ([facility])     6. Localized edema         Medical Decision Making:  Today's Assessment  / Status / Plan:     1. Atrial fibrilation:  - EKG today showed NSR  - off Tikosyn and remaining in NSR   - continue toprol XL 50mg BID   - continue oral anticoagulation with eliquis 5mg BID. Discussed the increased risk of bleeding with naproxen. Patient understands only takes it as needed once a week.      2. Hypertension:  - stable  - continue amlodipine 10 mg daily, metoprolol 50 mg twice daily, and furosemide 20 mg daily    3. Hyperlipidemia:  - LDL 97, goal less than 100  - continue omega 3     4. Mitral regurgitation:  - ECHO: showed mild     Follow up in 6 months with Dr. Ya, repeat CMP prior to next appointment     Collaborating Provider: Dr. Roth

## 2019-04-18 NOTE — LETTER
[facility]   [address]  [place] [address]  Phone: [phone]  Fax: [phone]              Warren Templeton  1950    Encounter Date: 4/18/2019    Yanique Mayorgahome          PROGRESS NOTE:  Chief Complaint   Patient presents with   • Atrial Fibrillation       Subjective:   Warren Templeton is a 68 y.o. male who presents today for follow atrial fibrillation.    He is patient of Dr. Ya in our clinic, HX: a-fib ablation with PVI 2/12/18 by Dr. Reddy, hypertension, hyperlipidemia, and mitral regurgitation.    Patient thought he went back into afib, due to his lack of energy lately and irregular pulse. He continues to take eliquis and metoprolol every day.     He has had no episodes of chest pain, dizziness/lightheadedness, shortness of breath, orthopnea, or peripheral edema.     Last OV with Dr. Ya 10/2018 patient was taken off tikosyn.    He is currently in the process of moving to [place], [place] from [place], [place].  Past Medical History:   Diagnosis Date   • Arthritis     all over   • Benign hypertensive heart disease without heart failure     Disabling from his career as a , occupation related.    • Breath shortness    • Chest pain, unspecified     ~2000 [facility] [place] [facility] [place] [place]   • Chronic anticoagulation 11/11/2016   • Cold 01/14/2018   • Diastolic dysfunction    • Headache, chronic daily    • Hemorrhagic disorder (HCC)     Eliquis   • High cholesterol    • Hypertension    • MR (mitral regurgitation)    • Osteoarthritis    • Paroxysmal atrial fibrillation (HCC) 2/28/2017   • Pure hypercholesterolemia     [facility]   • RLS (restless legs syndrome)      Past Surgical History:   Procedure Laterality Date   • CERVICAL DISK AND FUSION ANTERIOR      2 level   • GASTRIC BYPASS LAPAROSCOPIC      With Daron-en-Y   • KNEE ARTHROPLASTY TOTAL Bilateral    • OTHER ORTHOPEDIC SURGERY  2007 note;    Bilateral, with musculoskelatal limitations     Family History   Problem Relation Age of Onset   • Hypertension Mother    • Cancer Father         Esophageal   • Cancer Brother         Brain   • Hypertension Brother      Social History     Social History   • Marital status:      Spouse name: N/A   • Number of children: N/A   • Years of education: N/A     Occupational History   • Not on file.     Social History Main Topics   • Smoking status: Never Smoker   • Smokeless tobacco: Never Used   • Alcohol use Yes      Comment: 2-3X WEEK   • Drug use: No   • Sexual activity: Not on file     Other Topics Concern   • Not on file     Social History Narrative   • No narrative on file     Allergies   Allergen Reactions   • Magnesium Oxide Rash     Sores on scalp, rash on head&body, itchy anus     Outpatient Encounter Prescriptions as of 4/18/2019   Medication Sig Dispense Refill   • amLODIPine (NORVASC) 10 MG Tab Take 10 mg by mouth every day.  11   • apixaban (ELIQUIS) 5mg Tab Take 1 Tab by mouth 2 Times a Day. 60 Tab 11   • furosemide (LASIX) 20 MG Tab Take 1 Tab by mouth every day. 30 Tab 11   • metoprolol SR (TOPROL XL) 50 MG TABLET SR 24 HR Take 1 Tab by mouth 2 Times a Day. 60 Tab 11   • valsartan (DIOVAN) 160 MG Tab Take 1 Tab by mouth 2 times a day. 60 Tab 11   • potassium chloride SA (K-DUR) 10 MEQ Tab CR Take 2 Tabs by mouth every day. 60 Tab 11   • Multiple Vitamins-Minerals (OPTISOURCE POST BARIATRIC SURG PO) Take 1 Tab by mouth 2 Times a Day.     • Omega-3 Fatty Acids (FISH OIL) 1000 MG Cap capsule Take 2,000 mg by mouth 2 Times a Day.     • cyanocobalamin (VITAMIN B-12) 500 MCG TABS Take 500 mcg by mouth every day.     • omeprazole (PRILOSEC) 20 MG CPDR Take 20 mg by mouth 2 times a day.     • tramadol (ULTRAM) 50 MG TABS Take  mg by mouth every four hours as needed for Severe Pain.     • ascorbic acid (ASCORBIC ACID) 500 MG TABS Take 500 mg by mouth 2 Times a Day.     • Calcium Citrate (CITRACAL PO)  Take 2 Tabs by mouth 2 Times a Day.     • fluocinonide (LIDEX) 0.05 % external solution Apply 0.05 Applicators to affected area(s) every day.  3   • [DISCONTINUED] dofetilide (TIKOSYN) 250 MCG Cap Take 250 mcg by mouth 2 Times a Day.     • [DISCONTINUED] potassium chloride SA (K-DUR) 10 MEQ Tab CR Take 2 Tabs by mouth every day. (Patient not taking: Reported on 4/18/2019) 60 Tab 11   • [DISCONTINUED] metoprolol SR (TOPROL XL) 50 MG TABLET SR 24 HR Take 1 Tab by mouth 2 Times a Day. 180 Tab 3   • [DISCONTINUED] furosemide (LASIX) 20 MG Tab Take 1 Tab by mouth every day. 90 Tab 3   • [DISCONTINUED] potassium chloride SA (K-DUR) 10 MEQ Tab CR Take 2 Tabs by mouth every day. 30 Tab 3   • [DISCONTINUED] apixaban (ELIQUIS) 5mg Tab Take 1 Tab by mouth 2 Times a Day. 60 Tab 11   • naproxen (NAPROSYN) 500 MG Tab Take 500 mg by mouth as needed. Caution when using while taking Eliquis.     • [DISCONTINUED] baclofen (LIORESAL) 10 MG Tab Take 10 mg by mouth 4 times a day as needed (pain).     • [DISCONTINUED] Prenatal Vit-Fe Fumarate-FA (RE PRENATAL MULTIVITAMIN/IRON PO) Take 1 tablet by mouth 2 Times a Day.     • [DISCONTINUED] valsartan (DIOVAN) 160 MG TABS Take 160 mg by mouth 2 times a day.     • [DISCONTINUED] amlodipine (NORVASC) 10 MG TABS Take 10 mg by mouth every day.       No facility-administered encounter medications on file as of 4/18/2019.      Review of Systems   Constitutional: Positive for malaise/fatigue.   Eyes: Negative for blurred vision and double vision.   Respiratory: Negative for cough, sputum production, shortness of breath and wheezing.    Cardiovascular: Negative for chest pain, palpitations, orthopnea and leg swelling.   Musculoskeletal: Negative for falls.   Neurological: Negative for dizziness, focal weakness, loss of consciousness, weakness and headaches.   All other systems reviewed and are negative.       Objective:   /80 (BP Location: Left arm, Patient Position: Sitting, BP Cuff Size:  "Adult)   Pulse 66   Ht 1.854 m (6' 1\")   Wt 124.3 kg (274 lb)   BMI 36.15 kg/m²      Physical Exam   Constitutional: He is oriented to person, place, and time. He appears well-developed and well-nourished. No distress.   HENT:   Head: Normocephalic and atraumatic.   Eyes: Pupils are equal, round, and reactive to light.   Neck: No JVD present.   Cardiovascular: Normal rate, regular rhythm and normal heart sounds.    Pulmonary/Chest: Effort normal and breath sounds normal.   Abdominal: Soft. Bowel sounds are normal. He exhibits no distension.   Musculoskeletal: He exhibits no edema.   Neurological: He is alert and oriented to person, place, and time.   Skin: Skin is warm and dry. He is not diaphoretic.   Psychiatric: He has a normal mood and affect. His behavior is normal. Judgment and thought content normal.       Echocardiography Laboratory  7/11/2016  Normal left ventricular size and systolic function.  Mild concentric left ventricular hypertrophy.  Left ventricular ejection fraction is visually estimated to be 65%.  Severely dilated left atrium.  Mitral annular calcification.  Mild mitral regurgitation.  Mild tricuspid regurgitation.  Estimated right ventricular systolic pressure  is 30 mmHg.  Moderately dilated right ventricle.    [facility]  Cholesterol 153  Triglyceride 111  HDL 38  LDL 97    Sodium 139  Potassium 4.6  Creatinine 1.1  GFR 67      Assessment:     1. Paroxysmal atrial fibrillation (HCC)  EKG    apixaban (ELIQUIS) 5mg Tab    furosemide (LASIX) 20 MG Tab    metoprolol SR (TOPROL XL) 50 MG TABLET SR 24 HR    valsartan (DIOVAN) 160 MG Tab    Comp Metabolic Panel    potassium chloride SA (K-DUR) 10 MEQ Tab CR   2. Benign hypertensive heart disease without heart failure  apixaban (ELIQUIS) 5mg Tab    furosemide (LASIX) 20 MG Tab    metoprolol SR (TOPROL XL) 50 MG TABLET SR 24 HR    valsartan (DIOVAN) 160 MG Tab    Comp Metabolic Panel    potassium chloride SA (K-DUR) 10 MEQ Tab CR   3. " High risk medication use     4. PAF (paroxysmal atrial fibrillation) ([facility])     5. CHF (congestive heart failure), NYHA class I, unspecified failure chronicity, unspecified type (HCC)     6. Localized edema         Medical Decision Making:  Today's Assessment / Status / Plan:     1. Atrial fibrilation:  - EKG today showed NSR  - off Tikosyn and remaining in NSR   - continue toprol XL 50mg BID   - continue oral anticoagulation with eliquis 5mg BID. Discussed the increased risk of bleeding with naproxen. Patient understands only takes it as needed once a week.      2. Hypertension:  - stable  - continue amlodipine 10 mg daily, metoprolol 50 mg twice daily, and furosemide 20 mg daily    3. Hyperlipidemia:  - LDL 97, goal less than 100  - continue omega 3     4. Mitral regurgitation:  - ECHO: showed mild     Follow up in 6 months with Dr. Ya, repeat CMP prior to next appointment     Collaborating Provider: Dr. Ira Argueta M.D.  [address] [place] [id number]  VIA Facsimile: [phone]

## 2019-05-28 ENCOUNTER — TELEPHONE (OUTPATIENT)
Dept: CARDIOLOGY | Facility: MEDICAL CENTER | Age: 69
End: 2019-05-28

## 2019-05-28 DIAGNOSIS — I48.0 PAROXYSMAL ATRIAL FIBRILLATION (HCC): ICD-10-CM

## 2019-05-28 DIAGNOSIS — I11.9 BENIGN HYPERTENSIVE HEART DISEASE WITHOUT HEART FAILURE: ICD-10-CM

## 2019-05-28 DIAGNOSIS — I51.89 DIASTOLIC DYSFUNCTION: ICD-10-CM

## 2019-05-28 RX ORDER — FUROSEMIDE 20 MG/1
20 TABLET ORAL DAILY
Qty: 30 TAB | Refills: 11 | Status: SHIPPED | OUTPATIENT
Start: 2019-05-28 | End: 2020-06-23

## 2019-05-28 RX ORDER — VALSARTAN 160 MG/1
160 TABLET ORAL 2 TIMES DAILY
Qty: 60 TAB | Refills: 11 | Status: SHIPPED | OUTPATIENT
Start: 2019-05-28 | End: 2020-03-09 | Stop reason: SDUPTHER

## 2019-05-28 RX ORDER — METOPROLOL SUCCINATE 50 MG/1
50 TABLET, EXTENDED RELEASE ORAL 2 TIMES DAILY
Qty: 60 TAB | Refills: 11 | Status: SHIPPED | OUTPATIENT
Start: 2019-05-28 | End: 2020-08-19 | Stop reason: SDUPTHER

## 2019-05-28 RX ORDER — AMLODIPINE BESYLATE 10 MG/1
10 TABLET ORAL DAILY
Qty: 30 TAB | Refills: 11 | Status: SHIPPED | OUTPATIENT
Start: 2019-05-28 | End: 2020-06-23

## 2019-05-28 RX ORDER — POTASSIUM CHLORIDE 750 MG/1
20 TABLET, EXTENDED RELEASE ORAL DAILY
Qty: 60 TAB | Refills: 11 | Status: SHIPPED | OUTPATIENT
Start: 2019-05-28 | End: 2020-07-30

## 2019-05-28 NOTE — TELEPHONE ENCOUNTER
needs new prescriptions sent to pharmacy   Received: Today   Message Contents   JAQUELIN Maria/Jackie       Patient has moved to [place]. He has changed pharmacies and because he moved from [place], his prescriptions can't be transferred. [facility] [place]. Pt. #[phone]      Cardiac medications sent to [facility] in [place] per patient's request and recent re-location which was documented in last OV.     Contacted patient and informed.  Advised to contact clinic for any other needs.  Patient verbalizes understanding.

## 2019-09-30 ENCOUNTER — TELEPHONE (OUTPATIENT)
Dept: SCHEDULING | Facility: IMAGING CENTER | Age: 69
End: 2019-09-30

## 2019-10-11 ENCOUNTER — OFFICE VISIT (OUTPATIENT)
Dept: MEDICAL GROUP | Facility: CLINIC | Age: 69
End: 2019-10-11
Payer: MEDICARE

## 2019-10-11 VITALS
BODY MASS INDEX: 35.49 KG/M2 | OXYGEN SATURATION: 97 % | RESPIRATION RATE: 16 BRPM | DIASTOLIC BLOOD PRESSURE: 82 MMHG | WEIGHT: 262 LBS | SYSTOLIC BLOOD PRESSURE: 136 MMHG | TEMPERATURE: 98.2 F | HEART RATE: 68 BPM | HEIGHT: 72 IN

## 2019-10-11 DIAGNOSIS — G44.229 CHRONIC TENSION-TYPE HEADACHE, NOT INTRACTABLE: ICD-10-CM

## 2019-10-11 PROBLEM — Z76.89 ENCOUNTER TO ESTABLISH CARE WITH NEW DOCTOR: Status: ACTIVE | Noted: 2019-10-11

## 2019-10-11 PROBLEM — K21.9 GASTROESOPHAGEAL REFLUX DISEASE WITHOUT ESOPHAGITIS: Status: ACTIVE | Noted: 2019-10-11

## 2019-10-11 PROBLEM — L21.9 SEBORRHEIC DERMATITIS OF SCALP: Status: ACTIVE | Noted: 2019-10-11

## 2019-10-11 PROBLEM — L57.0 ACTINIC KERATOSES: Status: ACTIVE | Noted: 2019-10-11

## 2019-10-11 PROBLEM — H91.93 BILATERAL HEARING LOSS: Status: ACTIVE | Noted: 2019-10-11

## 2019-10-11 PROBLEM — B35.1 TOENAIL FUNGUS: Status: ACTIVE | Noted: 2019-10-11

## 2019-10-11 PROBLEM — Z98.890 HISTORY OF CARDIAC RADIOFREQUENCY ABLATION: Status: ACTIVE | Noted: 2019-10-11

## 2019-10-11 PROBLEM — M79.89 LEG SWELLING: Status: ACTIVE | Noted: 2019-10-11

## 2019-10-11 PROCEDURE — 99203 OFFICE O/P NEW LOW 30 MIN: CPT | Performed by: FAMILY MEDICINE

## 2019-10-11 RX ORDER — TERBINAFINE HYDROCHLORIDE 250 MG/1
250 TABLET ORAL DAILY
COMMUNITY
End: 2019-12-12

## 2019-10-11 SDOH — HEALTH STABILITY: MENTAL HEALTH: HOW OFTEN DO YOU HAVE A DRINK CONTAINING ALCOHOL?: 2-4 TIMES A MONTH

## 2019-10-11 ASSESSMENT — PATIENT HEALTH QUESTIONNAIRE - PHQ9: CLINICAL INTERPRETATION OF PHQ2 SCORE: 0

## 2019-10-12 NOTE — ASSESSMENT & PLAN NOTE
Recently moved here with wife from [place]. Retired . [facility] [place] Followed at [facility].

## 2019-10-12 NOTE — PROGRESS NOTES
Complaint: Needs local doc     Subjective:     Warren Templeton is a 69 y.o. male here today accompanied by his wife.    Encounter to establish care with new doctor  Recently moved here with wife from [place]. Retired . [facility] [place]. Followed at [facility].    Chronic tension-type headache, not intractable  Takes tramadol prn for headaches, has never tried Fioricet.     No other concerns or complaints today.    Current medicines (including changes today)  Current Outpatient Medications   Medication Sig Dispense Refill   • terbinafine (LAMISIL) 250 MG Tab Take 250 mg by mouth every day.     • amLODIPine (NORVASC) 10 MG Tab Take 1 Tab by mouth every day. 30 Tab 11   • apixaban (ELIQUIS) 5mg Tab Take 1 Tab by mouth 2 Times a Day. 60 Tab 11   • furosemide (LASIX) 20 MG Tab Take 1 Tab by mouth every day. 30 Tab 11   • metoprolol SR (TOPROL XL) 50 MG TABLET SR 24 HR Take 1 Tab by mouth 2 Times a Day. 60 Tab 11   • valsartan (DIOVAN) 160 MG Tab Take 1 Tab by mouth 2 times a day. 60 Tab 11   • potassium chloride SA (K-DUR) 10 MEQ Tab CR Take 2 Tabs by mouth every day. 60 Tab 11   • fluocinonide (LIDEX) 0.05 % external solution Apply 0.05 Applicators to affected area(s) every day.  3   • Multiple Vitamins-Minerals (OPTISOURCE POST BARIATRIC SURG PO) Take 1 Tab by mouth 2 Times a Day.     • naproxen (NAPROSYN) 500 MG Tab Take 500 mg by mouth as needed. Caution when using while taking Eliquis.     • Omega-3 Fatty Acids (FISH OIL) 1000 MG Cap capsule Take 2,000 mg by mouth 2 Times a Day.     • cyanocobalamin (VITAMIN B-12) 500 MCG TABS Take 500 mcg by mouth every day.     • omeprazole (PRILOSEC) 20 MG CPDR Take 20 mg by mouth 2 times a day.     • tramadol (ULTRAM) 50 MG TABS Take  mg by mouth every four hours as needed for Severe Pain.     • ascorbic acid (ASCORBIC ACID) 500 MG TABS Take 500 mg by mouth 2 Times a Day.     • Calcium Citrate (CITRACAL PO) Take 2 Tabs  by mouth 2 Times a Day.       No current facility-administered medications for this visit.      He  has a past medical history of Arthritis, Benign hypertensive heart disease without heart failure, Breath shortness, Chest pain, unspecified, Chronic anticoagulation (11/11/2016), Cold (01/14/2018), Diastolic dysfunction, Head ache, Headache, chronic daily, Hemorrhagic disorder (HCC), High cholesterol, Hypertension, MR (mitral regurgitation), Osteoarthritis, Paroxysmal atrial fibrillation (HCC) (2/28/2017), Pure hypercholesterolemia, and RLS (restless legs syndrome).    Health Maintenance:       Allergies: Magnesium oxide    Current Outpatient Medications Ordered in Epic   Medication Sig Dispense Refill   • terbinafine (LAMISIL) 250 MG Tab Take 250 mg by mouth every day.     • amLODIPine (NORVASC) 10 MG Tab Take 1 Tab by mouth every day. 30 Tab 11   • apixaban (ELIQUIS) 5mg Tab Take 1 Tab by mouth 2 Times a Day. 60 Tab 11   • furosemide (LASIX) 20 MG Tab Take 1 Tab by mouth every day. 30 Tab 11   • metoprolol SR (TOPROL XL) 50 MG TABLET SR 24 HR Take 1 Tab by mouth 2 Times a Day. 60 Tab 11   • valsartan (DIOVAN) 160 MG Tab Take 1 Tab by mouth 2 times a day. 60 Tab 11   • potassium chloride SA (K-DUR) 10 MEQ Tab CR Take 2 Tabs by mouth every day. 60 Tab 11   • fluocinonide (LIDEX) 0.05 % external solution Apply 0.05 Applicators to affected area(s) every day.  3   • Multiple Vitamins-Minerals (OPTISOURCE POST BARIATRIC SURG PO) Take 1 Tab by mouth 2 Times a Day.     • naproxen (NAPROSYN) 500 MG Tab Take 500 mg by mouth as needed. Caution when using while taking Eliquis.     • Omega-3 Fatty Acids (FISH OIL) 1000 MG Cap capsule Take 2,000 mg by mouth 2 Times a Day.     • cyanocobalamin (VITAMIN B-12) 500 MCG TABS Take 500 mcg by mouth every day.     • omeprazole (PRILOSEC) 20 MG CPDR Take 20 mg by mouth 2 times a day.     • tramadol (ULTRAM) 50 MG TABS Take  mg by mouth every four hours as needed for Severe Pain.      • ascorbic acid (ASCORBIC ACID) 500 MG TABS Take 500 mg by mouth 2 Times a Day.     • Calcium Citrate (CITRACAL PO) Take 2 Tabs by mouth 2 Times a Day.       No current [facility]-ordered facility-administered medications on file.        Past Medical History:   Diagnosis Date   • Arthritis     all over   • Benign hypertensive heart disease without heart failure     Disabling from his career as a , occupation related.    • Breath shortness    • Chest pain, unspecified     ~[place] [facility]   • Chronic anticoagulation 11/11/2016   • Cold 01/14/2018   • Diastolic dysfunction    • Head ache    • Headache, chronic daily    • Hemorrhagic disorder (HCC)     Eliquis   • High cholesterol    • Hypertension    • MR (mitral regurgitation)    • Osteoarthritis    • Paroxysmal atrial fibrillation (HCC) 2/28/2017   • Pure hypercholesterolemia     [facility]   • RLS (restless legs syndrome)        Past Surgical History:   Procedure Laterality Date   • CERVICAL DISK AND FUSION ANTERIOR      2 level   • GASTRIC BYPASS LAPAROSCOPIC      With Daron-en-Y   • KNEE ARTHROPLASTY TOTAL Bilateral    • OTHER ORTHOPEDIC SURGERY  2007 note;    Bilateral, with musculoskelatal limitations       Social History     Tobacco Use   • Smoking status: Never Smoker   • Smokeless tobacco: Never Used   Substance Use Topics   • Alcohol use: Yes     Alcohol/week: 1.2 oz     Types: 2 Cans of beer per week     Frequency: 2-4 times a month     Comment: 2-3X WEEK   • Drug use: No       Social History     Social History Narrative   • Not on file       Family History   Problem Relation Age of Onset   • Hypertension Mother    • Cancer Mother         uterine, leukemia, skin   • Cancer Father         Esophageal   • Cancer Brother         Brain   • Hypertension Brother          ROS   Patient denies any fever, chills, unintentional weight gain/loss, fatigue, stroke symptoms, dizziness, headache, nasal congestion, sore-throat,  "cough, heartburn, chest pain, difficulty breathing, abdominal discomfort, diarrhea/constipation, burning with urination or frequency, joint or back pain, skin rashes, depression or anxiety.       Objective:     /82   Pulse 68   Temp 36.8 °C (98.2 °F) (Temporal)   Resp 16   Ht 1.816 m (5' 11.5\")   Wt 118.8 kg (262 lb)   SpO2 97%  Body mass index is 36.03 kg/m².   Physical Exam:  Constitutional: Alert, no distress. Obese.  Skin: Warm, dry, good turgor, no rashes in visible areas.  Eye: Equal, round and reactive, conjunctiva clear, lids normal.  ENMT: Lips without lesions, good dentition, oropharynx clear.  Neck: Trachea midline, no masses, no thyromegaly. No cervical or supraclavicular lymphadenopathy  Respiratory: Unlabored respiratory effort, lungs clear to auscultation, no wheezes, no ronchi.  Cardiovascular: Normal S1, S2, no murmur, no extremity edema.  Abdomen: Soft, non-tender, no masses, no hepatosplenomegaly.  Psych: Alert and oriented x3, appropriate affect and mood.        Assessment and Plan:   The following treatment plan was discussed    1. Chronic tension-type headache, not intractable  Episodic, may request refill when needed. May try Fiorcet in future. Will need to sign CS agreement.    Will see patient for acute visits and for problems not dealt with by [facility] or the [facility]. Patient aware.    Followup: Return if symptoms worsen or fail to improve.    Please note that this dictation was created using voice recognition software. I have made every reasonable attempt to correct obvious errors, but I expect that there are errors of grammar and possibly content that I did not discover before finalizing the note.           "

## 2019-12-06 ENCOUNTER — TELEPHONE (OUTPATIENT)
Dept: CARDIOLOGY | Facility: MEDICAL CENTER | Age: 69
End: 2019-12-06

## 2019-12-12 ENCOUNTER — TELEPHONE (OUTPATIENT)
Dept: CARDIOLOGY | Facility: MEDICAL CENTER | Age: 69
End: 2019-12-12

## 2019-12-12 ENCOUNTER — OFFICE VISIT (OUTPATIENT)
Dept: CARDIOLOGY | Facility: MEDICAL CENTER | Age: 69
End: 2019-12-12
Payer: COMMERCIAL

## 2019-12-12 VITALS
WEIGHT: 259.2 LBS | SYSTOLIC BLOOD PRESSURE: 112 MMHG | HEIGHT: 73 IN | BODY MASS INDEX: 34.35 KG/M2 | DIASTOLIC BLOOD PRESSURE: 74 MMHG | HEART RATE: 62 BPM | OXYGEN SATURATION: 90 %

## 2019-12-12 DIAGNOSIS — Z79.01 CHRONIC ANTICOAGULATION: ICD-10-CM

## 2019-12-12 DIAGNOSIS — I51.89 DIASTOLIC DYSFUNCTION: ICD-10-CM

## 2019-12-12 DIAGNOSIS — I34.0 MILD MITRAL REGURGITATION: ICD-10-CM

## 2019-12-12 DIAGNOSIS — I48.0 PAROXYSMAL ATRIAL FIBRILLATION (HCC): ICD-10-CM

## 2019-12-12 PROCEDURE — 99215 OFFICE O/P EST HI 40 MIN: CPT | Performed by: INTERNAL MEDICINE

## 2019-12-12 NOTE — PROGRESS NOTES
Chief Complaint   Patient presents with   • Atrial Fibrillation       Subjective:   Warren Templeton is a 69 y.o. male who presents today for our initial visit and follow-up of atrial fibrillation status post atrial for ablation ablation with PVI by Dr. Reddy 2/12/2018 currently managed with oral anticoagulation in the form of Eliquis that he is tolerating well and antiarrhythmic therapy with dofetilide also tolerated well.  Has not had any recurrence of symptomatic atrial fibrillation since his ablation and was maintained on dofetilide prior to and subsequently.  He would like to discontinue the dofetilide as he indicates this was 1 of his motivations for undergoing the ablation in the first place.  Otherwise he does not perform routine physical activity but is active in his activities of daily living and has no chest discomfort or other symptoms.    In the interim since last visit we did discontinue Tikosyn and he has maintained sinus rhythm.  He is tolerating his anticoagulation well.  He moved to [place].  Was not able to complete his labs prior to this visit.  Feels well with no exertional symptoms.    Past Medical History:   Diagnosis Date   • Arthritis     all over   • Benign hypertensive heart disease without heart failure     Disabling from his career as a , occupation related.    • Breath shortness    • Chest pain, unspecified     ~2000 with negative cardiac cath by [facility]   • Chronic anticoagulation 11/11/2016   • Cold 01/14/2018   • Diastolic dysfunction    • Head ache    • Headache, chronic daily    • Hemorrhagic disorder (HCC)     Eliquis   • High cholesterol    • Hypertension    • MR (mitral regurgitation)    • Osteoarthritis    • Paroxysmal atrial fibrillation (HCC) 2/28/2017   • Pure hypercholesterolemia     [facility]   • RLS (restless legs syndrome)      Past Surgical History:   Procedure Laterality Date   • CERVICAL DISK AND FUSION ANTERIOR      2 level   • GASTRIC  BYPASS LAPAROSCOPIC      With Daron-en-Y   • KNEE ARTHROPLASTY TOTAL Bilateral    • OTHER ORTHOPEDIC SURGERY  2007 note;    Bilateral, with musculoskelatal limitations     Family History   Problem Relation Age of Onset   • Hypertension Mother    • Cancer Mother         uterine, leukemia, skin   • Cancer Father         Esophageal   • Cancer Brother         Brain   • Hypertension Brother      Social History     Socioeconomic History   • Marital status:      Spouse name: Not on file   • Number of children: Not on file   • Years of education: Not on file   • Highest education level: Not on file   Occupational History   • Not on file   Social Needs   • Financial resource strain: Not on file   • Food insecurity:     Worry: Not on file     Inability: Not on file   • Transportation needs:     Medical: Not on file     Non-medical: Not on file   Tobacco Use   • Smoking status: Never Smoker   • Smokeless tobacco: Never Used   Substance and Sexual Activity   • Alcohol use: Yes     Alcohol/week: 1.2 oz     Types: 2 Cans of beer per week     Frequency: 2-4 times a month     Comment: 2-3X WEEK   • Drug use: No   • Sexual activity: Yes     Partners: Female     Comment: Galine    Lifestyle   • Physical activity:     Days per week: Not on file     Minutes per session: Not on file   • Stress: Not on file   Relationships   • Social connections:     Talks on phone: Not on file     Gets together: Not on file     Attends [employer] service: Not on file     Active member of club or organization: Not on file     Attends meetings of clubs or organizations: Not on file     Relationship status: Not on file   • Intimate partner violence:     Fear of current or ex partner: Not on file     Emotionally abused: Not on file     Physically abused: Not on file     Forced sexual activity: Not on file   Other Topics Concern   • Not on file   Social History Narrative   • Not on file     Allergies   Allergen Reactions   • Magnesium Oxide Rash      "Sores on scalp, rash on head&body, itchy anus     Outpatient Encounter Medications as of 12/12/2019   Medication Sig Dispense Refill   • amLODIPine (NORVASC) 10 MG Tab Take 1 Tab by mouth every day. 30 Tab 11   • apixaban (ELIQUIS) 5mg Tab Take 1 Tab by mouth 2 Times a Day. 60 Tab 11   • furosemide (LASIX) 20 MG Tab Take 1 Tab by mouth every day. 30 Tab 11   • metoprolol SR (TOPROL XL) 50 MG TABLET SR 24 HR Take 1 Tab by mouth 2 Times a Day. 60 Tab 11   • valsartan (DIOVAN) 160 MG Tab Take 1 Tab by mouth 2 times a day. 60 Tab 11   • potassium chloride SA (K-DUR) 10 MEQ Tab CR Take 2 Tabs by mouth every day. 60 Tab 11   • fluocinonide (LIDEX) 0.05 % external solution Apply 0.05 Applicators to affected area(s) every day.  3   • Multiple Vitamins-Minerals (OPTISOURCE POST BARIATRIC SURG PO) Take 1 Tab by mouth 2 Times a Day.     • naproxen (NAPROSYN) 500 MG Tab Take 500 mg by mouth as needed. Caution when using while taking Eliquis.     • Omega-3 Fatty Acids (FISH OIL) 1000 MG Cap capsule Take 2,000 mg by mouth 2 Times a Day.     • cyanocobalamin (VITAMIN B-12) 500 MCG TABS Take 500 mcg by mouth every day.     • omeprazole (PRILOSEC) 20 MG CPDR Take 20 mg by mouth 2 times a day.     • tramadol (ULTRAM) 50 MG TABS Take  mg by mouth every four hours as needed for Severe Pain.     • ascorbic acid (ASCORBIC ACID) 500 MG TABS Take 500 mg by mouth 2 Times a Day.     • Calcium Citrate (CITRACAL PO) Take 2 Tabs by mouth 2 Times a Day.     • [DISCONTINUED] terbinafine (LAMISIL) 250 MG Tab Take 250 mg by mouth every day.       No facility-administered encounter medications on file as of 12/12/2019.      Review of Systems   All other systems reviewed and are negative.       Objective:   /74 (BP Location: Left arm, Patient Position: Sitting, BP Cuff Size: Adult)   Pulse 62   Ht 1.854 m (6' 1\")   Wt 117.6 kg (259 lb 3.2 oz)   SpO2 90%   BMI 34.20 kg/m²     Physical Exam   Constitutional: He is oriented to person, " place, and time. He appears well-developed and well-nourished. No distress.   Obese   HENT:   Head: Normocephalic and atraumatic.   Right Ear: External ear normal.   Left Ear: External ear normal.   Eyes: Pupils are equal, round, and reactive to light. Conjunctivae and EOM are normal. Right eye exhibits no discharge. Left eye exhibits no discharge. No scleral icterus.   Neck: Normal range of motion. Neck supple. No JVD present. No tracheal deviation present. No thyromegaly present.   Cardiovascular: Normal rate, regular rhythm and intact distal pulses.  Occasional extrasystoles are present. PMI is not displaced. Exam reveals no gallop and no friction rub.   No murmur heard.  Pulses:       Carotid pulses are 2+ on the right side and 2+ on the left side.       Radial pulses are 2+ on the left side.        Popliteal pulses are 2+ on the right side and 2+ on the left side.        Dorsalis pedis pulses are 2+ on the right side and 2+ on the left side.        Posterior tibial pulses are 2+ on the right side and 2+ on the left side.   Pulmonary/Chest: Effort normal and breath sounds normal. No respiratory distress. He has no wheezes. He has no rales. He exhibits no tenderness.   Abdominal: Soft. Bowel sounds are normal. He exhibits no distension. There is no tenderness.   Musculoskeletal: Normal range of motion.         General: No tenderness, deformity or edema.   Neurological: He is alert and oriented to person, place, and time. No cranial nerve deficit (cranial nerves II through XII grossly intact). Coordination normal.   Skin: Skin is warm and dry. No rash noted. He is not diaphoretic. No erythema. No pallor.   Psychiatric: He has a normal mood and affect. His behavior is normal. Thought content normal.   Vitals reviewed.  No significant change in since prior evaluation 4/18/2019.    LABS:  No results found for: CHOLSTRLTOT, LDL, HDL, TRIGLYCERIDE    Lab Results   Component Value Date/Time    WBC 5.5 02/12/2018 12:05  PM    RBC 4.29 (L) 2018 12:05 PM    HEMOGLOBIN 12.4 (L) 2018 12:05 PM    HEMATOCRIT 37.4 (L) 2018 12:05 PM    MCV 87.2 2018 12:05 PM    NEUTSPOLYS 55.10 2018 12:05 PM    LYMPHOCYTES 31.40 2018 12:05 PM    MONOCYTES 10.20 2018 12:05 PM    EOSINOPHILS 2.40 2018 12:05 PM    BASOPHILS 0.50 2018 12:05 PM     Lab Results   Component Value Date/Time    SODIUM 138 2018 12:05 PM    POTASSIUM 4.3 2018 12:05 PM    CHLORIDE 105 2018 12:05 PM    CO2 26 2018 12:05 PM    GLUCOSE 93 2018 12:05 PM    BUN 21 2018 12:05 PM    CREATININE 0.96 2018 12:05 PM         Lab Results   Component Value Date/Time    ALKPHOSPHAT 55 2018 12:05 PM    ASTSGOT 21 2018 12:05 PM    ALTSGPT 16 2018 12:05 PM    TBILIRUBIN 0.6 2018 12:05 PM      No results found for: BNPBTYPENAT   No results found for: TSH  Lab Results   Component Value Date/Time    PROTHROMBTM 15.0 (H) 2018 12:05 PM    INR 1.21 (H) 2018 12:05 PM      EKG (10/31/2018 ):  I have personally reviewed the EKG this visit and discussed with the patient.  Results for orders placed or performed in visit on 18   [facility] EKG (Clinic Performed)   Result Value Ref Range    Report       [facility]    Test Date:  2018  Pt Name:    GABRIELA MOSCOSO              Department: [facility]  MRN:        [id number]                      Room:  Gender:     Male                         Technician: SONI  :        1950                   Requested By:AMELIA YIP  Order #:    [id number]                    Reading MD: Noa Santiago MD    Measurements  Intervals                                Axis  Rate:       70                           P:          4  NE:         168                          QRS:        36  QRSD:       104                          T:          18  QT:         416  QTc:        449    Interpretive Statements  SINUS RHYTHM  BORDERLINE LOW  VOLTAGE IN FRONTAL LEADS  PROBABLE POSTERIOR INFARCT  Compared to ECG 03/06/2018 13:21:41  Myocardial infarct finding now present  T-wave abnormality no longer present    Electronically Signed On 5-9-2018 15:10:52 PDT by Noa Santiago MD         ECHO CONCLUSIONS (7/11/2016):  Compared to the images of the prior study done on 06/21/12, no   significant change    Normal left ventricular size and systolic function.  Mild concentric left ventricular hypertrophy.  Left ventricular ejection fraction is visually estimated to be 65%.  Severely dilated left atrium.  Mitral annular calcification.  Mild mitral regurgitation.  Mild tricuspid regurgitation.  Estimated right ventricular systolic pressure  is 30 mmHg.  Moderately dilated right ventricle.    Assessment:     1. Mild mitral regurgitation  EC-ECHOCARDIOGRAM COMPLETE W/O CONT    OVERNIGHT PULSE OXIMETRY   2. Paroxysmal atrial fibrillation (HCC)  Comp Metabolic Panel    EC-ECHOCARDIOGRAM COMPLETE W/O CONT    OVERNIGHT PULSE OXIMETRY   3. Diastolic dysfunction  Lipid Profile   4. Chronic anticoagulation  Comp Metabolic Panel    CBC WITHOUT DIFFERENTIAL    EC-ECHOCARDIOGRAM COMPLETE W/O CONT       Medical Decision Making:  Today's Assessment / Status / Plan:     Doing very well.  Dofetilide was discontinued post PVI.  Maintaining sinus rhythm without any symptoms.  He does screen positive for possible sleep apnea and indeed had an abnormal O p.o. in 2016 that was never addressed by his previous physician.  He feels he may continue to have sleep apnea and feels tired during the day.  This is despite maintaining sinus rhythm.  We discussed sleep apnea as effects of untreated on atrial fibrillation and his other cardiovascular risk profiles.  He agrees to repeat the test and if abnormal go see sleep medicine.    1.  CBC, CMP, lipid profile  2.  Overnight oximetry and if abnormal referral to sleep medicine  3.  Continue oral anticoagulation with Eliquis  4.  Dosing per  cardiology pending renal function evaluation.  5.  Echocardiogram to surveilled his previously mild valvular insufficiency in the context of his fatigue.    FU6M

## 2019-12-12 NOTE — TELEPHONE ENCOUNTER
OPO order faxed to [facility] at [phone]. Received receipt for confirmation

## 2020-01-06 ENCOUNTER — HOSPITAL ENCOUNTER (OUTPATIENT)
Dept: CARDIOLOGY | Facility: MEDICAL CENTER | Age: 70
End: 2020-01-06
Attending: INTERNAL MEDICINE
Payer: COMMERCIAL

## 2020-01-06 DIAGNOSIS — Z79.01 CHRONIC ANTICOAGULATION: ICD-10-CM

## 2020-01-06 DIAGNOSIS — I48.0 PAROXYSMAL ATRIAL FIBRILLATION (HCC): ICD-10-CM

## 2020-01-06 DIAGNOSIS — I34.0 MILD MITRAL REGURGITATION: ICD-10-CM

## 2020-01-06 PROCEDURE — 93306 TTE W/DOPPLER COMPLETE: CPT | Mod: 26 | Performed by: INTERNAL MEDICINE

## 2020-01-06 PROCEDURE — 93306 TTE W/DOPPLER COMPLETE: CPT

## 2020-01-07 LAB
LV EJECT FRACT  99904: 60
LV EJECT FRACT MOD 2C 99903: 63.49
LV EJECT FRACT MOD 4C 99902: 62.97
LV EJECT FRACT MOD BP 99901: 63.1

## 2020-01-09 ENCOUNTER — HOSPITAL ENCOUNTER (OUTPATIENT)
Dept: LAB | Facility: MEDICAL CENTER | Age: 70
End: 2020-01-09
Attending: INTERNAL MEDICINE
Payer: COMMERCIAL

## 2020-01-09 DIAGNOSIS — I51.89 DIASTOLIC DYSFUNCTION: ICD-10-CM

## 2020-01-09 DIAGNOSIS — Z79.01 CHRONIC ANTICOAGULATION: ICD-10-CM

## 2020-01-09 DIAGNOSIS — I48.0 PAROXYSMAL ATRIAL FIBRILLATION (HCC): ICD-10-CM

## 2020-01-09 LAB
ALBUMIN SERPL BCP-MCNC: 4.3 G/DL (ref 3.2–4.9)
ALBUMIN/GLOB SERPL: 1.7 G/DL
ALP SERPL-CCNC: 56 U/L (ref 30–99)
ALT SERPL-CCNC: 17 U/L (ref 2–50)
ANION GAP SERPL CALC-SCNC: 8 MMOL/L (ref 0–11.9)
AST SERPL-CCNC: 18 U/L (ref 12–45)
BILIRUB SERPL-MCNC: 0.6 MG/DL (ref 0.1–1.5)
BUN SERPL-MCNC: 20 MG/DL (ref 8–22)
CALCIUM SERPL-MCNC: 9.6 MG/DL (ref 8.5–10.5)
CHLORIDE SERPL-SCNC: 103 MMOL/L (ref 96–112)
CHOLEST SERPL-MCNC: 179 MG/DL (ref 100–199)
CO2 SERPL-SCNC: 28 MMOL/L (ref 20–33)
CREAT SERPL-MCNC: 1.11 MG/DL (ref 0.5–1.4)
ERYTHROCYTE [DISTWIDTH] IN BLOOD BY AUTOMATED COUNT: 48.8 FL (ref 35.9–50)
GLOBULIN SER CALC-MCNC: 2.6 G/DL (ref 1.9–3.5)
GLUCOSE SERPL-MCNC: 95 MG/DL (ref 65–99)
HCT VFR BLD AUTO: 43.8 % (ref 42–52)
HDLC SERPL-MCNC: 50 MG/DL
HGB BLD-MCNC: 14.1 G/DL (ref 14–18)
LDLC SERPL CALC-MCNC: 112 MG/DL
MCH RBC QN AUTO: 29.7 PG (ref 27–33)
MCHC RBC AUTO-ENTMCNC: 32.2 G/DL (ref 33.7–35.3)
MCV RBC AUTO: 92.4 FL (ref 81.4–97.8)
PLATELET # BLD AUTO: 210 K/UL (ref 164–446)
PMV BLD AUTO: 10.6 FL (ref 9–12.9)
POTASSIUM SERPL-SCNC: 4.4 MMOL/L (ref 3.6–5.5)
PROT SERPL-MCNC: 6.9 G/DL (ref 6–8.2)
RBC # BLD AUTO: 4.74 M/UL (ref 4.7–6.1)
SODIUM SERPL-SCNC: 139 MMOL/L (ref 135–145)
TRIGL SERPL-MCNC: 83 MG/DL (ref 0–149)
WBC # BLD AUTO: 8.3 K/UL (ref 4.8–10.8)

## 2020-01-09 PROCEDURE — 85027 COMPLETE CBC AUTOMATED: CPT

## 2020-01-09 PROCEDURE — 36415 COLL VENOUS BLD VENIPUNCTURE: CPT

## 2020-01-09 PROCEDURE — 80053 COMPREHEN METABOLIC PANEL: CPT

## 2020-01-09 PROCEDURE — 80061 LIPID PANEL: CPT

## 2020-03-06 ENCOUNTER — OFFICE VISIT (OUTPATIENT)
Dept: MEDICAL GROUP | Facility: PHYSICIAN GROUP | Age: 70
End: 2020-03-06
Payer: COMMERCIAL

## 2020-03-06 VITALS
RESPIRATION RATE: 20 BRPM | WEIGHT: 266 LBS | SYSTOLIC BLOOD PRESSURE: 118 MMHG | TEMPERATURE: 97.3 F | OXYGEN SATURATION: 95 % | HEIGHT: 73 IN | BODY MASS INDEX: 35.25 KG/M2 | HEART RATE: 87 BPM | DIASTOLIC BLOOD PRESSURE: 70 MMHG

## 2020-03-06 DIAGNOSIS — G47.30 SLEEP APNEA, UNSPECIFIED TYPE: ICD-10-CM

## 2020-03-06 DIAGNOSIS — I48.91 ATRIAL FIBRILLATION, UNSPECIFIED TYPE (HCC): ICD-10-CM

## 2020-03-06 DIAGNOSIS — I11.9 BENIGN HYPERTENSIVE HEART DISEASE WITHOUT HEART FAILURE: ICD-10-CM

## 2020-03-06 DIAGNOSIS — G44.229 CHRONIC TENSION-TYPE HEADACHE, NOT INTRACTABLE: ICD-10-CM

## 2020-03-06 DIAGNOSIS — Z98.84 HISTORY OF GASTRIC BYPASS: ICD-10-CM

## 2020-03-06 PROCEDURE — 99213 OFFICE O/P EST LOW 20 MIN: CPT | Performed by: NURSE PRACTITIONER

## 2020-03-06 ASSESSMENT — PATIENT HEALTH QUESTIONNAIRE - PHQ9: CLINICAL INTERPRETATION OF PHQ2 SCORE: 0

## 2020-03-06 ASSESSMENT — FIBROSIS 4 INDEX: FIB4 SCORE: 1.41

## 2020-03-06 NOTE — ASSESSMENT & PLAN NOTE
Patient is 70-year-old male here to establish care with me today.  [facility]  Reportedly had overnight pulse oximetry test done a month ago which showed some episodes of desaturation.  Patient has follow-up appointment with cardiology to review further.

## 2020-03-06 NOTE — ASSESSMENT & PLAN NOTE
Patient is 70-year-old male here to establish care with me today.  [facility]  Reports chronic headaches are under Workmen's Comp. which is [facility] the [facility].  Taking tramadol with some relief and over-the-counter medications.

## 2020-03-06 NOTE — ASSESSMENT & PLAN NOTE
Patient is 70-year-old male here to establish care with me today.  [facility].  Reports bilateral knee pain, Achilles tendon pain are covered by Workmen's Comp, which has been [facility] by the [facility].  Taking tramadol as needed.

## 2020-03-06 NOTE — PROGRESS NOTES
CC: Establish care    HISTORY OF THE PRESENT ILLNESS: Patient is a 70 y.o. male. This pleasant patient is here today, accompanied by wife, to establish care and for evaluation and management of the following health problems.  Patient recently moved here with his wife from [place].  He is a retired .  [facility] [place].  Followed by [facility].    Health Maintenance: Patient reports he is up-to-date on his colonoscopy and immunizations.  All [facility].      Sleep apnea  Patient is 70-year-old male here to establish care with me today.  [facility].  Reportedly had overnight pulse oximetry test done a month ago which showed some episodes of desaturation.  Patient has follow-up appointment with cardiology to review further.      Persistent atrial fibrillation        Osteoarthritis  Patient is 70-year-old male here to establish care with me today.  [facility].  Reports bilateral knee pain, Achilles tendon pain are covered by Workmen's Comp, which has been [facility] by the [facility].  Taking tramadol as needed.      Chronic tension-type headache, not intractable  Patient is 70-year-old male here to establish care with me today.  [facility].  Reports chronic headaches are under Workmen's Comp. which is [facility] the [facility].  Taking tramadol with some relief and over-the-counter medications.      Atrial fibrillation (CMS-HCC) (HCC)  Patient is 70-year-old male here to establish care with me today.  Reports a history of atrial fibrillation.  Managed by cardiology Dr. Ya, under Workmen's Compensation claim.  Taking Eliquis, metoprolol SR, valsartan, amlodipine, furosemide.  History of radiofrequency ablation.  Denies any concerns or new symptoms.    Benign hypertensive heart disease without heart failure  Patient is 70-year-old male here to establish care with me today.   [facility]  Hypertension being managed by cardiology under workman comp claim.  Related to his service as a .  Patient taking metoprolol SR, valsartan, amlodipine, furosemide, potassium supplement.    The patient denies chest pain, shortness of breath, headache, vision changes, epistaxis, or dyspnea on exertion.        Allergies: Magnesium oxide    Current Outpatient Medications Ordered in Epic   Medication Sig Dispense Refill   • amLODIPine (NORVASC) 10 MG Tab Take 1 Tab by mouth every day. 30 Tab 11   • apixaban (ELIQUIS) 5mg Tab Take 1 Tab by mouth 2 Times a Day. 60 Tab 11   • furosemide (LASIX) 20 MG Tab Take 1 Tab by mouth every day. 30 Tab 11   • metoprolol SR (TOPROL XL) 50 MG TABLET SR 24 HR Take 1 Tab by mouth 2 Times a Day. 60 Tab 11   • valsartan (DIOVAN) 160 MG Tab Take 1 Tab by mouth 2 times a day. 60 Tab 11   • potassium chloride SA (K-DUR) 10 MEQ Tab CR Take 2 Tabs by mouth every day. 60 Tab 11   • fluocinonide (LIDEX) 0.05 % external solution Apply 0.05 Applicators to affected area(s) every day.  3   • Multiple Vitamins-Minerals (OPTISOURCE POST BARIATRIC SURG PO) Take 1 Tab by mouth 2 Times a Day.     • naproxen (NAPROSYN) 500 MG Tab Take 500 mg by mouth as needed. Caution when using while taking Eliquis.     • Omega-3 Fatty Acids (FISH OIL) 1000 MG Cap capsule Take 2,000 mg by mouth 2 Times a Day.     • cyanocobalamin (VITAMIN B-12) 500 MCG TABS Take 500 mcg by mouth every day.     • omeprazole (PRILOSEC) 20 MG CPDR Take 20 mg by mouth 2 times a day.     • tramadol (ULTRAM) 50 MG TABS Take  mg by mouth every four hours as needed for Severe Pain.     • ascorbic acid (ASCORBIC ACID) 500 MG TABS Take 500 mg by mouth 2 Times a Day.     • Calcium Citrate (CITRACAL PO) Take 2 Tabs by mouth 2 Times a Day.       No current [facility]-ordered facility-administered medications on file.        Past Medical History:   Diagnosis Date   • Arthritis     all over   • Benign  "hypertensive heart disease without heart failure     Disabling from his career as a , occupation related.    • Breath shortness    • Chest pain, unspecified     ~2000 with negative cardiac cath by [facility]   • Chronic anticoagulation 11/11/2016   • Cold 01/14/2018   • Diastolic dysfunction    • Head ache    • Headache, chronic daily    • Hemorrhagic disorder (HCC)     Eliquis   • High cholesterol    • Hypertension    • MR (mitral regurgitation)    • Osteoarthritis    • Paroxysmal atrial fibrillation (HCC) 2/28/2017   • Pure hypercholesterolemia     [facility]   • RLS (restless legs syndrome)        Past Surgical History:   Procedure Laterality Date   • CATARACT EXTRACTION WITH IOL  2020   • CERVICAL DISK AND FUSION ANTERIOR      2 level   • GASTRIC BYPASS LAPAROSCOPIC      With Daron-en-Y   • KNEE ARTHROPLASTY TOTAL Bilateral    • OTHER ORTHOPEDIC SURGERY  2007 note;    Bilateral, with musculoskelatal limitations       Social History     Tobacco Use   • Smoking status: Never Smoker   • Smokeless tobacco: Never Used   Substance Use Topics   • Alcohol use: Yes     Alcohol/week: 1.2 oz     Types: 2 Cans of beer per week     Frequency: 2-4 times a month     Comment: 2-3X WEEK   • Drug use: No       Family History   Problem Relation Age of Onset   • Hypertension Mother    • Cancer Mother         uterine, leukemia, skin   • Cancer Father         Esophageal   • Cancer Brother         Brain   • Hypertension Brother        ROS:   As in HPI, otherwise negative for chest pain, dyspnea, abdominal pain, dysuria, blood in stool, fever          Exam: /70   Pulse 87   Temp 36.3 °C (97.3 °F)   Resp 20   Ht 1.854 m (6' 1\")   Wt 120.7 kg (266 lb)   SpO2 95%  Body mass index is 35.09 kg/m².    General: Alert, pleasant, obese habitus, well nourished, well developed male in NAD  HEENT: Normocephalic. Eyes conjunctiva clear lids without ptosis, pupils equal and reactive to light, ears normal shape and " contour, canals are clear bilaterally, tympanic membranes are pearly gray with good light reflex, nasal mucosa without erythema and drainage, oropharynx is without erythema, edema or exudates.   Neck: Supple without bruit. Thyroid is not enlarged.  Pulmonary: Clear to ausculation.  Normal effort. No rales, ronchi, or wheezing.  Cardiovascular: Normal rate and rhythm without murmur. Carotid and radial pulses are intact and equal bilaterally.  No lower extremity edema.  Abdomen: Soft, nontender, nondistended. Normal bowel sounds. Liver and spleen are not palpable  Neurologic: Grossly nonfocal  Lymph: No cervical or supraclavicular lymph nodes are palpable  Skin: Warm and dry.    Musculoskeletal: Normal gait.   Psych: Normal mood and affect. Alert and oriented. Judgment and insight is normal.    Please note that this dictation was created using voice recognition software. I have made every reasonable attempt to correct obvious errors, but I expect that there are errors of grammar and possibly content that I did not discover before finalizing the note.      Assessment/Plan  1. Sleep apnea, unspecified type  Reportedly overnight pulse oximetry showed some desaturations.  Ordered by cardiology.  [facility] if [facility] study needed.    2. Chronic tension-type headache, not intractable  Chronic health problem, stable.  Continue with over-the-counter medications, tramadol as needed.  [facility].    3. Atrial fibrillation, unspecified type (HCC)  Managed by cardiology.  Continue with medications, no changes.  Continue follow-up with cardiology.    4. Benign hypertensive heart disease without heart failure  Managed by cardiology.  Continue with medications, continue with follow-up with cardiology.    Recent labs from 1/20 reviewed today.  CMP, CBC normal.  Mildly elevated LDL.  Patient will return to clinic annually or sooner if needed.

## 2020-03-09 DIAGNOSIS — I48.0 PAROXYSMAL ATRIAL FIBRILLATION (HCC): ICD-10-CM

## 2020-03-09 DIAGNOSIS — I11.9 BENIGN HYPERTENSIVE HEART DISEASE WITHOUT HEART FAILURE: ICD-10-CM

## 2020-03-09 DIAGNOSIS — I51.89 DIASTOLIC DYSFUNCTION: ICD-10-CM

## 2020-03-09 PROBLEM — Z76.89 ENCOUNTER TO ESTABLISH CARE WITH NEW DOCTOR: Status: RESOLVED | Noted: 2019-10-11 | Resolved: 2020-03-09

## 2020-03-09 PROBLEM — Z98.84 HISTORY OF GASTRIC BYPASS: Status: ACTIVE | Noted: 2020-03-09

## 2020-03-09 RX ORDER — VALSARTAN 160 MG/1
160 TABLET ORAL 2 TIMES DAILY
Qty: 60 TAB | Refills: 11 | Status: SHIPPED | OUTPATIENT
Start: 2020-03-09 | End: 2020-08-19 | Stop reason: SDUPTHER

## 2020-03-09 NOTE — ASSESSMENT & PLAN NOTE
Patient is 70-year-old male here to establish care with me today.  [facility]  Hypertension being managed by cardiology under workman comp claim.  Related to his service as a .  Patient taking metoprolol SR, valsartan, amlodipine, furosemide, potassium supplement.    The patient denies chest pain, shortness of breath, headache, vision changes, epistaxis, or dyspnea on exertion.

## 2020-03-09 NOTE — ASSESSMENT & PLAN NOTE
Patient is 70-year-old male here to establish care with me today.  Reports a history of atrial fibrillation.  Managed by cardiology Dr. Ya, under Workmen's Compensation claim.  Taking Eliquis, metoprolol SR, valsartan, amlodipine, furosemide.  History of radiofrequency ablation.  Denies any concerns or new symptoms.

## 2020-03-09 NOTE — ASSESSMENT & PLAN NOTE
Patient is 70-year-old male here to establish care with me today.  He reports history of gastric bypass.

## 2020-06-01 ENCOUNTER — APPOINTMENT (OUTPATIENT)
Dept: CARDIOLOGY | Facility: MEDICAL CENTER | Age: 70
End: 2020-06-01

## 2020-06-16 ENCOUNTER — TELEPHONE (OUTPATIENT)
Dept: CARDIOLOGY | Facility: MEDICAL CENTER | Age: 70
End: 2020-06-16

## 2020-06-16 NOTE — TELEPHONE ENCOUNTER
Refused Prescriptions       Name from pharmacy: ELIQUIS 5MG TABLETS          Will file in chart as: ELIQUIS 5 MG Tab          Sig: TAKE ONE TABLET BY MOUTH TWICE DAILY     Disp:  180 Tab    Refills:  3 (Pharmacy requested: Not specified)     Start: 6/16/2020     Class: Normal     Refused by: Gwendolyn Jarrell R.N.         Pt called. Pt will see MD on 6/18 AM and has med available until that time. MD to refill based on plan of care at that time.

## 2020-06-18 ENCOUNTER — OFFICE VISIT (OUTPATIENT)
Dept: CARDIOLOGY | Facility: MEDICAL CENTER | Age: 70
End: 2020-06-18
Payer: COMMERCIAL

## 2020-06-18 VITALS
HEART RATE: 78 BPM | SYSTOLIC BLOOD PRESSURE: 120 MMHG | BODY MASS INDEX: 36.7 KG/M2 | HEIGHT: 72 IN | WEIGHT: 271 LBS | DIASTOLIC BLOOD PRESSURE: 82 MMHG | OXYGEN SATURATION: 95 %

## 2020-06-18 DIAGNOSIS — Z79.01 CHRONIC ANTICOAGULATION: Chronic | ICD-10-CM

## 2020-06-18 DIAGNOSIS — I51.89 DIASTOLIC DYSFUNCTION: ICD-10-CM

## 2020-06-18 DIAGNOSIS — Z98.890 HISTORY OF CARDIAC RADIOFREQUENCY ABLATION: ICD-10-CM

## 2020-06-18 DIAGNOSIS — I48.0 PAROXYSMAL ATRIAL FIBRILLATION (HCC): ICD-10-CM

## 2020-06-18 PROCEDURE — 99215 OFFICE O/P EST HI 40 MIN: CPT | Performed by: INTERNAL MEDICINE

## 2020-06-18 RX ORDER — NAPROXEN 500 MG/1
500 TABLET ORAL PRN
Qty: 30 TAB | Refills: 3 | Status: SHIPPED | OUTPATIENT
Start: 2020-06-18 | End: 2021-07-12

## 2020-06-18 ASSESSMENT — FIBROSIS 4 INDEX: FIB4 SCORE: 1.41

## 2020-06-18 NOTE — PROGRESS NOTES
Chief Complaint   Patient presents with   • Atrial Fibrillation     Follow up       Subjective:   Warren Templeton is a 70 y.o. male who presents today for our initial visit and follow-up of atrial fibrillation status post atrial for ablation ablation with PVI by Dr. Reddy 2/12/2018 currently managed with oral anticoagulation in the form of Eliquis that he is tolerating well and antiarrhythmic therapy with dofetilide also tolerated well.  Has not had any recurrence of symptomatic atrial fibrillation since his ablation and was maintained on dofetilide prior to and subsequently.  He would like to discontinue the dofetilide as he indicates this was 1 of his motivations for undergoing the ablation in the first place.  Otherwise he does not perform routine physical activity but is active in his activities of daily living and has no chest discomfort or other symptoms.    In the interim he has been doing well with no recurrence of A. fib taking his medications and tolerating his anticoagulation well.    Past Medical History:   Diagnosis Date   • Arthritis     all over   • Benign hypertensive heart disease without heart failure     Disabling from his career as a , occupation related.    • Breath shortness    • Chest pain, unspecified     ~2000 with negative cardiac cath by [facility]   • Chronic anticoagulation 11/11/2016   • Cold 01/14/2018   • Diastolic dysfunction    • Head ache    • Headache, chronic daily    • Hemorrhagic disorder (HCC)     Eliquis   • High cholesterol    • Hypertension    • MR (mitral regurgitation)    • Osteoarthritis    • Paroxysmal atrial fibrillation (HCC) 2/28/2017   • Pure hypercholesterolemia     [facility]   • RLS (restless legs syndrome)      Past Surgical History:   Procedure Laterality Date   • CATARACT EXTRACTION WITH IOL  2020   • CERVICAL DISK AND FUSION ANTERIOR      2 level   • GASTRIC BYPASS LAPAROSCOPIC      With Daron-en-Y   • KNEE ARTHROPLASTY TOTAL Bilateral     • OTHER ORTHOPEDIC SURGERY  2007 note;    Bilateral, with musculoskelatal limitations     Family History   Problem Relation Age of Onset   • Hypertension Mother    • Cancer Mother         uterine, leukemia, skin   • Cancer Father         Esophageal   • Cancer Brother         Brain   • Hypertension Brother      Social History     Socioeconomic History   • Marital status:      Spouse name: Not on file   • Number of children: Not on file   • Years of education: Not on file   • Highest education level: Not on file   Occupational History   • Not on file   Social Needs   • Financial resource strain: Not on file   • Food insecurity     Worry: Not on file     Inability: Not on file   • Transportation needs     Medical: Not on file     Non-medical: Not on file   Tobacco Use   • Smoking status: Never Smoker   • Smokeless tobacco: Never Used   Substance and Sexual Activity   • Alcohol use: Yes     Alcohol/week: 1.2 oz     Types: 2 Cans of beer per week     Frequency: 2-4 times a month     Comment: 2-3X WEEK   • Drug use: No   • Sexual activity: Yes     Partners: Female     Comment: Galine    Lifestyle   • Physical activity     Days per week: Not on file     Minutes per session: Not on file   • Stress: Not on file   Relationships   • Social connections     Talks on phone: Not on file     Gets together: Not on file     Attends Hindu service: Not on file     Active member of club or organization: Not on file     Attends meetings of clubs or organizations: Not on file     Relationship status: Not on file   • Intimate partner violence     Fear of current or ex partner: Not on file     Emotionally abused: Not on file     Physically abused: Not on file     Forced sexual activity: Not on file   Other Topics Concern   • Not on file   Social History Narrative   • Not on file     Allergies   Allergen Reactions   • Magnesium Oxide Rash     Sores on scalp, rash on head&body, itchy anus     Outpatient Encounter Medications as  "of 6/18/2020   Medication Sig Dispense Refill   • apixaban (ELIQUIS) 5mg Tab Take 1 Tab by mouth 2 Times a Day. 180 Tab 3   • naproxen (NAPROSYN) 500 MG Tab Take 1 Tab by mouth as needed (Occasional use, no more than 2x/week). Caution when using while taking Eliquis. 30 Tab 3   • valsartan (DIOVAN) 160 MG Tab Take 1 Tab by mouth 2 times a day. 60 Tab 11   • amLODIPine (NORVASC) 10 MG Tab Take 1 Tab by mouth every day. 30 Tab 11   • furosemide (LASIX) 20 MG Tab Take 1 Tab by mouth every day. 30 Tab 11   • metoprolol SR (TOPROL XL) 50 MG TABLET SR 24 HR Take 1 Tab by mouth 2 Times a Day. 60 Tab 11   • potassium chloride SA (K-DUR) 10 MEQ Tab CR Take 2 Tabs by mouth every day. 60 Tab 11   • fluocinonide (LIDEX) 0.05 % external solution Apply 0.05 Applicators to affected area(s) every day.  3   • Multiple Vitamins-Minerals (OPTISOURCE POST BARIATRIC SURG PO) Take 1 Tab by mouth 2 Times a Day.     • Omega-3 Fatty Acids (FISH OIL) 1000 MG Cap capsule Take 2,000 mg by mouth 2 Times a Day.     • cyanocobalamin (VITAMIN B-12) 500 MCG TABS Take 500 mcg by mouth every day.     • omeprazole (PRILOSEC) 20 MG CPDR Take 20 mg by mouth 2 times a day.     • tramadol (ULTRAM) 50 MG TABS Take  mg by mouth every four hours as needed for Severe Pain.     • ascorbic acid (ASCORBIC ACID) 500 MG TABS Take 500 mg by mouth 2 Times a Day.     • Calcium Citrate (CITRACAL PO) Take 2 Tabs by mouth 2 Times a Day.     • [DISCONTINUED] apixaban (ELIQUIS) 5mg Tab Take 1 Tab by mouth 2 Times a Day. 60 Tab 11   • [DISCONTINUED] naproxen (NAPROSYN) 500 MG Tab Take 500 mg by mouth as needed. Caution when using while taking Eliquis.       No facility-administered encounter medications on file as of 6/18/2020.      Review of Systems   All other systems reviewed and are negative.       Objective:   /82 (BP Location: Left arm, Patient Position: Sitting, BP Cuff Size: Adult)   Pulse 78   Ht 1.816 m (5' 11.5\")   Wt 122.9 kg (271 lb)   SpO2 95% "   BMI 37.27 kg/m²     Physical Exam   Constitutional: He is oriented to person, place, and time. He appears well-developed and well-nourished. No distress.   Obese   HENT:   Head: Normocephalic and atraumatic.   Right Ear: External ear normal.   Left Ear: External ear normal.   Eyes: Pupils are equal, round, and reactive to light. Conjunctivae and EOM are normal. Right eye exhibits no discharge. Left eye exhibits no discharge. No scleral icterus.   Neck: Normal range of motion. Neck supple. No JVD present. No tracheal deviation present. No thyromegaly present.   Cardiovascular: Normal rate, regular rhythm and intact distal pulses.  Occasional extrasystoles are present. PMI is not displaced. Exam reveals no gallop and no friction rub.   No murmur heard.  Pulses:       Carotid pulses are 2+ on the right side and 2+ on the left side.       Radial pulses are 2+ on the left side.        Popliteal pulses are 2+ on the right side and 2+ on the left side.        Dorsalis pedis pulses are 2+ on the right side and 2+ on the left side.        Posterior tibial pulses are 2+ on the right side and 2+ on the left side.   Pulmonary/Chest: Effort normal and breath sounds normal. No respiratory distress. He has no wheezes. He has no rales. He exhibits no tenderness.   Abdominal: Soft. Bowel sounds are normal. He exhibits no distension. There is no abdominal tenderness.   Musculoskeletal: Normal range of motion.         General: No tenderness, deformity or edema.   Neurological: He is alert and oriented to person, place, and time. No cranial nerve deficit (cranial nerves II through XII grossly intact). Coordination normal.   Skin: Skin is warm and dry. No rash noted. He is not diaphoretic. No erythema. No pallor.   Psychiatric: He has a normal mood and affect. His behavior is normal. Thought content normal.   Vitals reviewed.  No significant change in since prior evaluation 12/12/2019    LABS:  Lab Results   Component Value Date/Time     CHOLSTRLTOT 179 2020 02:40 PM     (H) 2020 02:40 PM    HDL 50 2020 02:40 PM    TRIGLYCERIDE 83 2020 02:40 PM       Lab Results   Component Value Date/Time    WBC 8.3 2020 02:40 PM    RBC 4.74 2020 02:40 PM    HEMOGLOBIN 14.1 2020 02:40 PM    HEMATOCRIT 43.8 2020 02:40 PM    MCV 92.4 2020 02:40 PM    NEUTSPOLYS 55.10 2018 12:05 PM    LYMPHOCYTES 31.40 2018 12:05 PM    MONOCYTES 10.20 2018 12:05 PM    EOSINOPHILS 2.40 2018 12:05 PM    BASOPHILS 0.50 2018 12:05 PM     Lab Results   Component Value Date/Time    SODIUM 139 2020 02:40 PM    POTASSIUM 4.4 2020 02:40 PM    CHLORIDE 103 2020 02:40 PM    CO2 28 2020 02:40 PM    GLUCOSE 95 2020 02:40 PM    BUN 20 2020 02:40 PM    CREATININE 1.11 2020 02:40 PM         Lab Results   Component Value Date/Time    ALKPHOSPHAT 56 2020 02:40 PM    ASTSGOT 18 2020 02:40 PM    ALTSGPT 17 2020 02:40 PM    TBILIRUBIN 0.6 2020 02:40 PM      No results found for: BNPBTYPENAT   No results found for: TSH  Lab Results   Component Value Date/Time    PROTHROMBTM 15.0 (H) 2018 12:05 PM    INR 1.21 (H) 2018 12:05 PM      EKG (10/31/2018 ):  I have personally reviewed the EKG this visit and discussed with the patient.  Results for orders placed or performed in visit on 18   [facility] EKG (Clinic Performed)   Result Value Ref Range    Report       [facility]    Test Date:  2018  Pt Name:    GABRIELA COLEMANCASEY              Department: [facility]  MRN:        [id number]                      Room:  Gender:     Male                         Technician: SONI  :        1950                   Requested By:AMELIA Velazquez #:    [id number]                    Reading MD: Noa Santiago MD    Measurements  Intervals                                Axis  Rate:       70                           P:           4  NV:         168                          QRS:        36  QRSD:       104                          T:          18  QT:         416  QTc:        449    Interpretive Statements  SINUS RHYTHM  BORDERLINE LOW VOLTAGE IN FRONTAL LEADS  PROBABLE POSTERIOR INFARCT  Compared to ECG 03/06/2018 13:21:41  Myocardial infarct finding now present  T-wave abnormality no longer present    Electronically Signed On 5-9-2018 15:10:52 PDT by Noa Santiago MD         ECHO CONCLUSIONS (7/11/2016):  Compared to the images of the prior study done on 06/21/12, no   significant change    Normal left ventricular size and systolic function.  Mild concentric left ventricular hypertrophy.  Left ventricular ejection fraction is visually estimated to be 65%.  Severely dilated left atrium.  Mitral annular calcification.  Mild mitral regurgitation.  Mild tricuspid regurgitation.  Estimated right ventricular systolic pressure  is 30 mmHg.  Moderately dilated right ventricle.    Assessment:     1. Paroxysmal atrial fibrillation (HCC)  apixaban (ELIQUIS) 5mg Tab   2. History of cardiac radiofrequency ablation     3. Chronic anticoagulation     4. Diastolic dysfunction         Medical Decision Making:  Today's Assessment / Status / Plan:     Doing well.  Continue anticoagulation.  Renal function normal.  No bleeding or problems.  No problems with atrial fibrillation.  Occasional use of Naprosyn for musculoskeletal discomfort is reasonable we discussed the increased risk of bleeding associated with it and safety precautions were discussed at length.    FU6-12M

## 2020-06-22 DIAGNOSIS — I51.89 DIASTOLIC DYSFUNCTION: ICD-10-CM

## 2020-06-22 DIAGNOSIS — I11.9 BENIGN HYPERTENSIVE HEART DISEASE WITHOUT HEART FAILURE: ICD-10-CM

## 2020-06-22 DIAGNOSIS — I48.0 PAROXYSMAL ATRIAL FIBRILLATION (HCC): ICD-10-CM

## 2020-06-26 RX ORDER — FUROSEMIDE 20 MG/1
TABLET ORAL
Qty: 30 TAB | Refills: 11 | Status: SHIPPED | OUTPATIENT
Start: 2020-06-26 | End: 2020-08-19 | Stop reason: SDUPTHER

## 2020-06-26 RX ORDER — AMLODIPINE BESYLATE 10 MG/1
TABLET ORAL
Qty: 30 TAB | Refills: 11 | Status: SHIPPED | OUTPATIENT
Start: 2020-06-26 | End: 2020-08-19 | Stop reason: SDUPTHER

## 2020-07-29 DIAGNOSIS — I11.9 BENIGN HYPERTENSIVE HEART DISEASE WITHOUT HEART FAILURE: ICD-10-CM

## 2020-07-29 DIAGNOSIS — I51.89 DIASTOLIC DYSFUNCTION: ICD-10-CM

## 2020-07-29 DIAGNOSIS — I48.0 PAROXYSMAL ATRIAL FIBRILLATION (HCC): ICD-10-CM

## 2020-08-10 RX ORDER — POTASSIUM CHLORIDE 750 MG/1
TABLET, EXTENDED RELEASE ORAL
Qty: 180 TAB | Refills: 3 | Status: SHIPPED | OUTPATIENT
Start: 2020-08-10 | End: 2020-08-19 | Stop reason: SDUPTHER

## 2020-08-19 ENCOUNTER — OFFICE VISIT (OUTPATIENT)
Dept: CARDIOLOGY | Facility: MEDICAL CENTER | Age: 70
End: 2020-08-19
Payer: COMMERCIAL

## 2020-08-19 VITALS
WEIGHT: 267 LBS | DIASTOLIC BLOOD PRESSURE: 80 MMHG | SYSTOLIC BLOOD PRESSURE: 128 MMHG | BODY MASS INDEX: 36.16 KG/M2 | OXYGEN SATURATION: 96 % | HEIGHT: 72 IN | HEART RATE: 66 BPM

## 2020-08-19 DIAGNOSIS — I34.0 NONRHEUMATIC MITRAL VALVE REGURGITATION: ICD-10-CM

## 2020-08-19 DIAGNOSIS — Z98.890 HISTORY OF CARDIAC RADIOFREQUENCY ABLATION: ICD-10-CM

## 2020-08-19 DIAGNOSIS — Z79.01 CHRONIC ANTICOAGULATION: Chronic | ICD-10-CM

## 2020-08-19 DIAGNOSIS — I11.9 BENIGN HYPERTENSIVE HEART DISEASE WITHOUT HEART FAILURE: ICD-10-CM

## 2020-08-19 DIAGNOSIS — G47.34 NOCTURNAL HYPOXIA: ICD-10-CM

## 2020-08-19 DIAGNOSIS — I48.0 PAROXYSMAL ATRIAL FIBRILLATION (HCC): ICD-10-CM

## 2020-08-19 DIAGNOSIS — I51.89 DIASTOLIC DYSFUNCTION: ICD-10-CM

## 2020-08-19 DIAGNOSIS — E78.00 HYPERCHOLESTEROLEMIA: ICD-10-CM

## 2020-08-19 PROCEDURE — 99214 OFFICE O/P EST MOD 30 MIN: CPT | Performed by: NURSE PRACTITIONER

## 2020-08-19 RX ORDER — METOPROLOL SUCCINATE 50 MG/1
50 TABLET, EXTENDED RELEASE ORAL 2 TIMES DAILY
Qty: 180 TAB | Refills: 3 | Status: SHIPPED | OUTPATIENT
Start: 2020-08-19 | End: 2021-06-21 | Stop reason: SDUPTHER

## 2020-08-19 RX ORDER — FUROSEMIDE 20 MG/1
20 TABLET ORAL
Qty: 90 TAB | Refills: 3 | Status: SHIPPED | OUTPATIENT
Start: 2020-08-19 | End: 2021-07-17

## 2020-08-19 RX ORDER — AMLODIPINE BESYLATE 10 MG/1
10 TABLET ORAL
Qty: 90 TAB | Refills: 3 | Status: SHIPPED | OUTPATIENT
Start: 2020-08-19 | End: 2021-06-21

## 2020-08-19 RX ORDER — POTASSIUM CHLORIDE 750 MG/1
20 TABLET, EXTENDED RELEASE ORAL DAILY
Qty: 180 TAB | Refills: 3 | Status: SHIPPED | OUTPATIENT
Start: 2020-08-19 | End: 2021-09-13

## 2020-08-19 RX ORDER — VALSARTAN 160 MG/1
160 TABLET ORAL 2 TIMES DAILY
Qty: 180 TAB | Refills: 3 | Status: SHIPPED | OUTPATIENT
Start: 2020-08-19 | End: 2021-03-19 | Stop reason: SDUPTHER

## 2020-08-19 ASSESSMENT — ENCOUNTER SYMPTOMS
ORTHOPNEA: 0
PND: 0
DIZZINESS: 0
WEAKNESS: 0
SHORTNESS OF BREATH: 0
WEIGHT LOSS: 0
PALPITATIONS: 0
CLAUDICATION: 0

## 2020-08-19 ASSESSMENT — FIBROSIS 4 INDEX: FIB4 SCORE: 1.41

## 2020-08-19 NOTE — PROGRESS NOTES
"Chief Complaint   Patient presents with   • Atrial Fibrillation     Previous patient        Subjective:   Warren Templeton is a 70 y.o. male patient of Dr. Ayush Ya who presents today for follow up regarding his atrial fibrillation.     Patient was last seen by Dr. Ya on 6/18/20, he was overall doing well no significant changes were made to his cardiovascular regimen.    Past medical history significant for A-Fib S/P ablation with PVI by Dr. Reddy on 2/12/18, chronically OAC with Eliquis, diastolic dysfunction, hypertension, hyperlipidemia and mild mitral regurgitation.    Today patient states that he made the appointment today a few weeks ago because he was having significant fatigue and his family reported that when they felt his pulse it did seem to \"skip a beat\".  His fatigue completely resolved approximately 1 week ago and now feels back to baseline. Patient wanted to keep his appointment here today to discuss possible recurrence of his atrial fibrillation.        Past Medical History:   Diagnosis Date   • Arthritis     all over   • Benign hypertensive heart disease without heart failure     Disabling from his career as a , occupation related.    • Breath shortness    • Chest pain, unspecified     ~2000 with negative cardiac cath by [facility]   • Chronic anticoagulation 11/11/2016   • Cold 01/14/2018   • Diastolic dysfunction    • Head ache    • Headache, chronic daily    • Hemorrhagic disorder (HCC)     Eliquis   • High cholesterol    • Hypertension    • MR (mitral regurgitation)    • Osteoarthritis    • Paroxysmal atrial fibrillation (HCC) 2/28/2017   • Pure hypercholesterolemia     [facility]   • RLS (restless legs syndrome)      Past Surgical History:   Procedure Laterality Date   • CATARACT EXTRACTION WITH IOL  2020   • CERVICAL DISK AND FUSION ANTERIOR      2 level   • GASTRIC BYPASS LAPAROSCOPIC      With Daron-en-Y   • KNEE ARTHROPLASTY TOTAL Bilateral    • OTHER " ORTHOPEDIC SURGERY  2007 note;    Bilateral, with musculoskelatal limitations     Family History   Problem Relation Age of Onset   • Hypertension Mother    • Cancer Mother         uterine, leukemia, skin   • Cancer Father         Esophageal   • Cancer Brother         Brain   • Hypertension Brother      Social History     Socioeconomic History   • Marital status:      Spouse name: Not on file   • Number of children: Not on file   • Years of education: Not on file   • Highest education level: Not on file   Occupational History   • Not on file   Social Needs   • Financial resource strain: Not on file   • Food insecurity     Worry: Not on file     Inability: Not on file   • Transportation needs     Medical: Not on file     Non-medical: Not on file   Tobacco Use   • Smoking status: Never Smoker   • Smokeless tobacco: Never Used   Substance and Sexual Activity   • Alcohol use: Yes     Alcohol/week: 1.2 oz     Types: 2 Cans of beer per week     Frequency: 2-4 times a month     Comment: 2-3X WEEK   • Drug use: No   • Sexual activity: Yes     Partners: Female     Comment: Galine    Lifestyle   • Physical activity     Days per week: Not on file     Minutes per session: Not on file   • Stress: Not on file   Relationships   • Social connections     Talks on phone: Not on file     Gets together: Not on file     Attends Church service: Not on file     Active member of club or organization: Not on file     Attends meetings of clubs or organizations: Not on file     Relationship status: Not on file   • Intimate partner violence     Fear of current or ex partner: Not on file     Emotionally abused: Not on file     Physically abused: Not on file     Forced sexual activity: Not on file   Other Topics Concern   • Not on file   Social History Narrative   • Not on file     Allergies   Allergen Reactions   • Magnesium Oxide Rash     Sores on scalp, rash on head&body, itchy anus     Outpatient Encounter Medications as of  8/19/2020   Medication Sig Dispense Refill   • amLODIPine (NORVASC) 10 MG Tab Take 1 Tab by mouth every day. 90 Tab 3   • valsartan (DIOVAN) 160 MG Tab Take 1 Tab by mouth 2 times a day. 180 Tab 3   • metoprolol SR (TOPROL XL) 50 MG TABLET SR 24 HR Take 1 Tab by mouth 2 Times a Day. 180 Tab 3   • furosemide (LASIX) 20 MG Tab Take 1 Tab by mouth every day. 90 Tab 3   • apixaban (ELIQUIS) 5mg Tab Take 1 Tab by mouth 2 Times a Day. 180 Tab 3   • potassium chloride SA (K-DUR) 10 MEQ Tab CR Take 2 Tabs by mouth every day. 180 Tab 3   • naproxen (NAPROSYN) 500 MG Tab Take 1 Tab by mouth as needed (Occasional use, no more than 2x/week). Caution when using while taking Eliquis. 30 Tab 3   • fluocinonide (LIDEX) 0.05 % external solution Apply 0.05 Applicators to affected area(s) every day.  3   • Multiple Vitamins-Minerals (OPTISOURCE POST BARIATRIC SURG PO) Take 1 Tab by mouth 2 Times a Day.     • Omega-3 Fatty Acids (FISH OIL) 1000 MG Cap capsule Take 2,000 mg by mouth 2 Times a Day.     • cyanocobalamin (VITAMIN B-12) 500 MCG TABS Take 500 mcg by mouth every day.     • omeprazole (PRILOSEC) 20 MG CPDR Take 20 mg by mouth 2 times a day.     • tramadol (ULTRAM) 50 MG TABS Take  mg by mouth every four hours as needed for Severe Pain.     • ascorbic acid (ASCORBIC ACID) 500 MG TABS Take 500 mg by mouth 2 Times a Day.     • Calcium Citrate (CITRACAL PO) Take 2 Tabs by mouth 2 Times a Day.     • [DISCONTINUED] potassium chloride SA (K-DUR) 10 MEQ Tab CR TAKE 2 TABLETS BY MOUTH EVERY  Tab 3   • [DISCONTINUED] furosemide (LASIX) 20 MG Tab TAKE 1 TABLET BY MOUTH EVERY DAY 30 Tab 11   • [DISCONTINUED] amLODIPine (NORVASC) 10 MG Tab TAKE 1 TABLET BY MOUTH EVERY DAY 30 Tab 11   • [DISCONTINUED] apixaban (ELIQUIS) 5mg Tab Take 1 Tab by mouth 2 Times a Day. 180 Tab 3   • [DISCONTINUED] valsartan (DIOVAN) 160 MG Tab Take 1 Tab by mouth 2 times a day. 60 Tab 11   • [DISCONTINUED] metoprolol SR (TOPROL XL) 50 MG TABLET SR 24  "HR Take 1 Tab by mouth 2 Times a Day. 60 Tab 11     No facility-administered encounter medications on file as of 8/19/2020.      Review of Systems   Constitutional: Negative for malaise/fatigue and weight loss.   Respiratory: Negative for shortness of breath.    Cardiovascular: Negative for chest pain, palpitations, orthopnea, claudication, leg swelling and PND.   Neurological: Negative for dizziness and weakness.   All other systems reviewed and are negative.       Objective:   /80 (BP Location: Left arm, Patient Position: Sitting, BP Cuff Size: Adult)   Pulse 66   Ht 1.816 m (5' 11.5\")   Wt 121.1 kg (267 lb)   SpO2 96%   BMI 36.72 kg/m²     Physical Exam   Constitutional: He is oriented to person, place, and time. He appears well-developed and well-nourished. No distress.   HENT:   Head: Normocephalic.   Eyes: EOM are normal.   Neck: No JVD present.   Cardiovascular: Normal rate and regular rhythm.   No carotid bruit   Pulmonary/Chest: Effort normal and breath sounds normal.   Abdominal: Soft. There is no abdominal tenderness.   Musculoskeletal:         General: No edema.   Neurological: He is alert and oriented to person, place, and time.   Skin: Skin is warm and dry.   Psychiatric: He has a normal mood and affect. His behavior is normal.        Echocardiogram 1/6/20:  CONCLUSIONS  Prior echo completed 7/11/2016.  Normal left ventricular chamber size.  Left ventricular ejection fraction is visually estimated to be 60%.  Mild concentric left ventricular hypertrophy.  Mild mitral regurgitation.  Estimated right ventricular systolic pressure  is 35 mmHg.  Compared to the images of the prior study done -  the right ventricle is normal size and mitral regurgitation remains unchanged.    Assessment:     1. Paroxysmal atrial fibrillation (HCC)  valsartan (DIOVAN) 160 MG Tab    metoprolol SR (TOPROL XL) 50 MG TABLET SR 24 HR    furosemide (LASIX) 20 MG Tab    apixaban (ELIQUIS) 5mg Tab    potassium chloride SA " (K-DUR) 10 MEQ Tab CR   2. Chronic anticoagulation     3. Diastolic dysfunction  amLODIPine (NORVASC) 10 MG Tab    valsartan (DIOVAN) 160 MG Tab    metoprolol SR (TOPROL XL) 50 MG TABLET SR 24 HR    furosemide (LASIX) 20 MG Tab    potassium chloride SA (K-DUR) 10 MEQ Tab CR   4. History of cardiac radiofrequency ablation     5. Hypercholesterolemia     6. Nonrheumatic mitral valve regurgitation     7. Benign hypertensive heart disease without heart failure  amLODIPine (NORVASC) 10 MG Tab    valsartan (DIOVAN) 160 MG Tab    metoprolol SR (TOPROL XL) 50 MG TABLET SR 24 HR    furosemide (LASIX) 20 MG Tab    potassium chloride SA (K-DUR) 10 MEQ Tab CR   8. Nocturnal hypoxia         Medical Decision Making:  Today's Assessment / Status / Plan:     Patient is overall doing well.  Rhythm is regular upon auscultation in office today.  Continues to tolerate OAC well without any bleeding issues.  Lab work stable from January of this year including CBC, CMP and lipid panel reviewed.  Echo stable in January of this year.      Abnormal OPO completed in January of this year showing low SPO2 of 53% and 828 oxygen desaturation events was discussed with patient.  I discussed the implications of untreated JIGNA with his history of paroxysmal atrial fibrillation.  Patient states that he is claustrophobic and would not be able to tolerate a CPAP if it was recommended and is not interested in completing a sleep study at this time.  Patient will call our office and let us know if he changes his mind.    Patient will follow-up with Dr. Ayush Ya in 6 months or earlier if needed.  Encouraged patient to contact our office should any questions or concerns arise in the meantime.  Patient understands and agrees with plan of care.    Collaborating Provider: Dr. Arnulfo Bianchi       Please note that this dictation was created using voice recognition software. I have made every reasonable attempt to correct obvious errors, but I expect  that there are errors of grammar and possibly content I did not discover before finalizing the note.

## 2020-12-30 ENCOUNTER — TELEMEDICINE (OUTPATIENT)
Dept: TELEHEALTH | Facility: TELEMEDICINE | Age: 70
End: 2020-12-30
Payer: MEDICARE

## 2020-12-30 DIAGNOSIS — Z20.822 EXPOSURE TO COVID-19 VIRUS: ICD-10-CM

## 2020-12-30 PROCEDURE — 99203 OFFICE O/P NEW LOW 30 MIN: CPT | Mod: 95,CR,CS | Performed by: PHYSICIAN ASSISTANT

## 2020-12-31 NOTE — PROGRESS NOTES
Virtual Visit: New Patient   This visit was conducted via Zoom using secure and encrypted videoconferencing technology. The patient was in a private location in the state of [place].    The patient's identity was confirmed and verbal consent was obtained for this virtual visit.    Subjective:     CC:   Chief Complaint   Patient presents with   • Coronavirus Screening       Warren Templeton is a 70 y.o. male presenting to establish care and to discuss the evaluation and management of:    Patient presents to virtual visit requesting COVID-19 test.  He reports he was exposed to his wife.  His wife recently positive for COVID-19 and take an ambulance last night.  He denies any current symptoms other than a back pain he has had that he has been going to chiropractor for.  He reports of cold symptoms about 1.5 weeks ago that have all resolved.  He had a headache, sore throat, nasal congestion that had resolved.  He denies any other symptoms.  History of chronic anticoagulation, sleep apnea, hypertension, atrial fibrillation, mitral regurgitation and diastolic dysfunction, radiofrequency ablation.    ROS  See HPI  Constitutional: Negative for fever, chills and malaise/fatigue.   HENT: Negative for congestion.    Eyes: Negative for pain.   Respiratory: Negative for cough and shortness of breath.    Cardiovascular: Negative CP. Negative for leg swelling.   Gastrointestinal: Negative for nausea, vomiting, abdominal pain and diarrhea.   Genitourinary: Negative for dysuria and hematuria.   Skin: Negative for rash.   Neurological: Negative for dizziness, focal weakness and headaches.   Endo/Heme/Allergies: Does not bruise/bleed easily. Takes Eliquis.    Psychiatric/Behavioral: Negative for depression.  The patient is not nervous/anxious.      Allergies   Allergen Reactions   • Magnesium Oxide Rash     Sores on scalp, rash on head&body, itchy anus       Current medicines (including changes today)  Current Outpatient  Medications   Medication Sig Dispense Refill   • amLODIPine (NORVASC) 10 MG Tab Take 1 Tab by mouth every day. 90 Tab 3   • valsartan (DIOVAN) 160 MG Tab Take 1 Tab by mouth 2 times a day. 180 Tab 3   • metoprolol SR (TOPROL XL) 50 MG TABLET SR 24 HR Take 1 Tab by mouth 2 Times a Day. 180 Tab 3   • furosemide (LASIX) 20 MG Tab Take 1 Tab by mouth every day. 90 Tab 3   • apixaban (ELIQUIS) 5mg Tab Take 1 Tab by mouth 2 Times a Day. 180 Tab 3   • potassium chloride SA (K-DUR) 10 MEQ Tab CR Take 2 Tabs by mouth every day. 180 Tab 3   • naproxen (NAPROSYN) 500 MG Tab Take 1 Tab by mouth as needed (Occasional use, no more than 2x/week). Caution when using while taking Eliquis. 30 Tab 3   • fluocinonide (LIDEX) 0.05 % external solution Apply 0.05 Applicators to affected area(s) every day.  3   • Multiple Vitamins-Minerals (OPTISOURCE POST BARIATRIC SURG PO) Take 1 Tab by mouth 2 Times a Day.     • Omega-3 Fatty Acids (FISH OIL) 1000 MG Cap capsule Take 2,000 mg by mouth 2 Times a Day.     • cyanocobalamin (VITAMIN B-12) 500 MCG TABS Take 500 mcg by mouth every day.     • omeprazole (PRILOSEC) 20 MG CPDR Take 20 mg by mouth 2 times a day.     • tramadol (ULTRAM) 50 MG TABS Take  mg by mouth every four hours as needed for Severe Pain.     • ascorbic acid (ASCORBIC ACID) 500 MG TABS Take 500 mg by mouth 2 Times a Day.     • Calcium Citrate (CITRACAL PO) Take 2 Tabs by mouth 2 Times a Day.       No current facility-administered medications for this visit.        He  has a past medical history of Arthritis, Benign hypertensive heart disease without heart failure, Breath shortness, Chest pain, unspecified, Chronic anticoagulation (11/11/2016), Cold (01/14/2018), Diastolic dysfunction, Head ache, Headache, chronic daily, Hemorrhagic disorder (HCC), High cholesterol, Hypertension, MR (mitral regurgitation), Osteoarthritis, Paroxysmal atrial fibrillation (HCC) (2/28/2017), Pure hypercholesterolemia, and RLS (restless legs  syndrome).  He  has a past surgical history that includes gastric bypass laparoscopic; cervical disk and fusion anterior; other orthopedic surgery (2007 note;); knee arthroplasty total (Bilateral); and cataract extraction with iol (2020).      Family History   Problem Relation Age of Onset   • Hypertension Mother    • Cancer Mother         uterine, leukemia, skin   • Cancer Father         Esophageal   • Cancer Brother         Brain   • Hypertension Brother      Family Status   Relation Name Status   • Mo  (Not Specified)   • Fa  (Not Specified)   • Bro  (Not Specified)       Patient Active Problem List    Diagnosis Date Noted   • Sleep apnea 08/26/2016     Priority: Medium   • Nocturnal hypoxia 08/19/2020   • History of gastric bypass 03/09/2020   • History of cardiac radiofrequency ablation 10/11/2019   • Chronic tension-type headache, not intractable 10/11/2019   • Bilateral hearing loss 10/11/2019   • Toenail fungus 10/11/2019   • Gastroesophageal reflux disease without esophagitis 10/11/2019   • Seborrheic dermatitis of scalp 10/11/2019   • Leg swelling 10/11/2019   • Actinic keratoses 10/11/2019   • Atrial fibrillation (HCC) 01/17/2018   • High risk medication use 11/11/2016   • Chronic anticoagulation 11/11/2016   • Diastolic dysfunction 05/18/2012   • MR (mitral regurgitation) 05/18/2012   • Osteoarthritis 05/18/2012   • Benign hypertensive heart disease without heart failure 08/20/2007   • Hypercholesterolemia 08/20/2007   • Other chest pain 08/20/2007          Objective:   There were no vitals taken for this visit.    Physical Exam:  Constitutional: Alert, no distress, well-groomed.  Skin: No rashes in visible areas.  Eye: Round. Conjunctiva clear, lids normal. No icterus.   ENMT: Lips pink without lesions, good dentition, moist mucous membranes. Phonation normal.  Speaking in full sentences.  Neck: No masses, no thyromegaly. Moves freely without pain.  Respiratory: Unlabored respiratory effort, no cough or  audible wheeze  Psych: Alert and oriented x3, normal affect and mood.       Assessment and Plan:   The following treatment plan was discussed:     1. Exposure to COVID-19 virus  - COVID/SARS COV-2 PCR; Future      Screen for COVID-19 at  drive thru site. We will call/message back for results and appropriate further instructions. Instructed to sign up for [employer] if they have not already. Result will be automatically released to [employer] application for patient review. I will be sending a message with Next Step Instructions to [employer] soon after resulted.   Symptomatic and supportive care:   Plenty of oral hydration and rest .   Infection control measures at home. Stay away from people, Hand washing, covering sneeze/cough, disinfect surfaces.   Remain home from work, school, and other populated environments. Work note provided with information of quarantine measures per CDC guidelines.   Overall, the patient is well-appearing. They are not hypoxic, afebrile, and a normal pulmonary exam.    Patient is high risk.  Red flags discussed and indications to immediately call 911 or present to the Emergency Department.   Supportive care, differential diagnoses, and indications for immediate follow-up discussed with patient.    Pathogenesis of diagnosis discussed including typical length and natural progression. Patient expresses understanding and agrees to plan.      Follow-up: Return if symptoms worsen or fail to improve.

## 2021-01-05 ENCOUNTER — HOSPITAL ENCOUNTER (OUTPATIENT)
Dept: LAB | Facility: MEDICAL CENTER | Age: 71
End: 2021-01-05
Attending: PHYSICIAN ASSISTANT
Payer: MEDICARE

## 2021-01-05 DIAGNOSIS — Z20.822 EXPOSURE TO COVID-19 VIRUS: ICD-10-CM

## 2021-01-05 LAB
COVID ORDER STATUS COVID19: NORMAL
SARS-COV-2 RNA RESP QL NAA+PROBE: NOTDETECTED
SPECIMEN SOURCE: NORMAL

## 2021-01-05 PROCEDURE — C9803 HOPD COVID-19 SPEC COLLECT: HCPCS

## 2021-01-05 PROCEDURE — U0005 INFEC AGEN DETEC AMPLI PROBE: HCPCS

## 2021-01-05 PROCEDURE — U0003 INFECTIOUS AGENT DETECTION BY NUCLEIC ACID (DNA OR RNA); SEVERE ACUTE RESPIRATORY SYNDROME CORONAVIRUS 2 (SARS-COV-2) (CORONAVIRUS DISEASE [COVID-19]), AMPLIFIED PROBE TECHNIQUE, MAKING USE OF HIGH THROUGHPUT TECHNOLOGIES AS DESCRIBED BY CMS-2020-01-R: HCPCS

## 2021-01-07 ENCOUNTER — TELEMEDICINE (OUTPATIENT)
Dept: MEDICAL GROUP | Facility: PHYSICIAN GROUP | Age: 71
End: 2021-01-07
Payer: MEDICARE

## 2021-01-07 ENCOUNTER — HOSPITAL ENCOUNTER (OUTPATIENT)
Dept: LAB | Facility: MEDICAL CENTER | Age: 71
End: 2021-01-07
Attending: NURSE PRACTITIONER
Payer: MEDICARE

## 2021-01-07 VITALS — BODY MASS INDEX: 36.16 KG/M2 | WEIGHT: 267 LBS | HEIGHT: 72 IN

## 2021-01-07 DIAGNOSIS — Z86.19 HISTORY OF VIRAL ILLNESS: ICD-10-CM

## 2021-01-07 LAB — SARS-COV-2 AB SERPL QL IA: REACTIVE

## 2021-01-07 PROCEDURE — 86769 SARS-COV-2 COVID-19 ANTIBODY: CPT

## 2021-01-07 PROCEDURE — 36415 COLL VENOUS BLD VENIPUNCTURE: CPT

## 2021-01-07 PROCEDURE — 99212 OFFICE O/P EST SF 10 MIN: CPT | Mod: 95,CR | Performed by: NURSE PRACTITIONER

## 2021-01-07 ASSESSMENT — PATIENT HEALTH QUESTIONNAIRE - PHQ9: CLINICAL INTERPRETATION OF PHQ2 SCORE: 0

## 2021-01-07 ASSESSMENT — FIBROSIS 4 INDEX: FIB4 SCORE: 1.41

## 2021-01-07 NOTE — ASSESSMENT & PLAN NOTE
As a means of avoiding spread of COVID-19, this visit is being conducted by video.  Patient reports history of viral illness about a month ago.  Had sinus congestion and severe headache.  Wife tested positive for Covid a week ago.  Patient had a Covid test a week ago, which was about 10 to 14 days after his symptoms resolved.  Covid test came back negative.  Interested in wanting to know if he did have Covid, requesting antibody test.  Would like to donate plasma if he has had COVID.  Denies symptoms.

## 2021-01-07 NOTE — PROGRESS NOTES
Virtual Visit: Established Patient   This visit was conducted via [employer] using secure and encrypted videoconferencing technology. The patient was in a private location in the state of [place].    The patient's identity was confirmed and verbal consent was obtained for this virtual visit.    Subjective:   CC:   Chief Complaint   Patient presents with   • Medication Refill       Warren Templeton is a 70 y.o. male presenting for evaluation and management of:    History of viral illness  As a means of avoiding spread of COVID-19, this visit is being conducted by video.  Patient reports history of viral illness about a month ago.  Had sinus congestion and severe headache.  Wife tested positive for Covid a week ago.  Patient had a Covid test a week ago, which was about 10 to 14 days after his symptoms resolved.  Covid test came back negative.  Interested in wanting to know if he did have Covid, requesting antibody test.  Would like to donate plasma if he has had COVID.  Denies symptoms.      ROS   Denies any recent fevers or chills. No nausea or vomiting. No chest pains or shortness of breath.     Allergies   Allergen Reactions   • Magnesium Oxide Rash     Sores on scalp, rash on head&body, itchy anus       Current medicines (including changes today)  Current Outpatient Medications   Medication Sig Dispense Refill   • amLODIPine (NORVASC) 10 MG Tab Take 1 Tab by mouth every day. 90 Tab 3   • valsartan (DIOVAN) 160 MG Tab Take 1 Tab by mouth 2 times a day. 180 Tab 3   • metoprolol SR (TOPROL XL) 50 MG TABLET SR 24 HR Take 1 Tab by mouth 2 Times a Day. 180 Tab 3   • furosemide (LASIX) 20 MG Tab Take 1 Tab by mouth every day. 90 Tab 3   • apixaban (ELIQUIS) 5mg Tab Take 1 Tab by mouth 2 Times a Day. 180 Tab 3   • potassium chloride SA (K-DUR) 10 MEQ Tab CR Take 2 Tabs by mouth every day. 180 Tab 3   • naproxen (NAPROSYN) 500 MG Tab Take 1 Tab by mouth as needed (Occasional use, no more than 2x/week). Caution when  using while taking Eliquis. 30 Tab 3   • fluocinonide (LIDEX) 0.05 % external solution Apply 0.05 Applicators to affected area(s) every day.  3   • Multiple Vitamins-Minerals (OPTISOURCE POST BARIATRIC SURG PO) Take 1 Tab by mouth 2 Times a Day.     • Omega-3 Fatty Acids (FISH OIL) 1000 MG Cap capsule Take 2,000 mg by mouth 2 Times a Day.     • cyanocobalamin (VITAMIN B-12) 500 MCG TABS Take 500 mcg by mouth every day.     • omeprazole (PRILOSEC) 20 MG CPDR Take 20 mg by mouth 2 times a day.     • tramadol (ULTRAM) 50 MG TABS Take  mg by mouth every four hours as needed for Severe Pain.     • ascorbic acid (ASCORBIC ACID) 500 MG TABS Take 500 mg by mouth 2 Times a Day.     • Calcium Citrate (CITRACAL PO) Take 2 Tabs by mouth 2 Times a Day.       No current facility-administered medications for this visit.        Patient Active Problem List    Diagnosis Date Noted   • Sleep apnea 08/26/2016     Priority: Medium   • History of viral illness 01/07/2021   • Nocturnal hypoxia 08/19/2020   • History of gastric bypass 03/09/2020   • History of cardiac radiofrequency ablation 10/11/2019   • Chronic tension-type headache, not intractable 10/11/2019   • Bilateral hearing loss 10/11/2019   • Toenail fungus 10/11/2019   • Gastroesophageal reflux disease without esophagitis 10/11/2019   • Seborrheic dermatitis of scalp 10/11/2019   • Leg swelling 10/11/2019   • Actinic keratoses 10/11/2019   • Atrial fibrillation (HCC) 01/17/2018   • High risk medication use 11/11/2016   • Chronic anticoagulation 11/11/2016   • Diastolic dysfunction 05/18/2012   • MR (mitral regurgitation) 05/18/2012   • Osteoarthritis 05/18/2012   • Benign hypertensive heart disease without heart failure 08/20/2007   • Hypercholesterolemia 08/20/2007   • Other chest pain 08/20/2007       Family History   Problem Relation Age of Onset   • Hypertension Mother    • Cancer Mother         uterine, leukemia, skin   • Cancer Father         Esophageal   • Cancer  "Brother         Brain   • Hypertension Brother        He  has a past medical history of Arthritis, Benign hypertensive heart disease without heart failure, Breath shortness, Chest pain, unspecified, Chronic anticoagulation (11/11/2016), Cold (01/14/2018), Diastolic dysfunction, Head ache, Headache, chronic daily, Hemorrhagic disorder (HCC), High cholesterol, Hypertension, MR (mitral regurgitation), Osteoarthritis, Paroxysmal atrial fibrillation (HCC) (2/28/2017), Pure hypercholesterolemia, and RLS (restless legs syndrome).  He  has a past surgical history that includes gastric bypass laparoscopic; cervical disk and fusion anterior; other orthopedic surgery (2007 note;); knee arthroplasty total (Bilateral); and cataract extraction with iol (2020).       Objective:   Ht 1.816 m (5' 11.5\")   Wt 121.1 kg (267 lb)   BMI 36.72 kg/m²     Physical Exam:  Constitutional: Alert, no distress, well-groomed.  Skin: No rashes in visible areas.  Eye: Round. Conjunctiva clear, lids normal. No icterus.   ENMT: Lips pink without lesions, good dentition, moist mucous membranes. Phonation normal.  Neck: No masses, no thyromegaly. Moves freely without pain.  Respiratory: Unlabored respiratory effort, no cough or audible wheeze  Psych: Alert and oriented x3, normal affect and mood.       Assessment and Plan:   The following treatment plan was discussed:     1. History of viral illness  Patient has recent viral illness and wife had Covid.  Patient had negative Covid test after symptoms resolved.  Wanting to have Covid antibody test as his goal is to donate plasma for Covid treatment for others.  Will notify him of results.  - COVID-19 IgG, Qual; Future    Follow-up: No follow-ups on file.           "

## 2021-03-19 DIAGNOSIS — I51.89 DIASTOLIC DYSFUNCTION: ICD-10-CM

## 2021-03-19 DIAGNOSIS — I48.0 PAROXYSMAL ATRIAL FIBRILLATION (HCC): ICD-10-CM

## 2021-03-19 DIAGNOSIS — I11.9 BENIGN HYPERTENSIVE HEART DISEASE WITHOUT HEART FAILURE: ICD-10-CM

## 2021-03-19 NOTE — TELEPHONE ENCOUNTER
Pt was last seen by BETTY on 8-19-20. RX prepped for 6 month refill. Per chart review year supply was sent on 8-19-20.

## 2021-03-23 RX ORDER — VALSARTAN 160 MG/1
160 TABLET ORAL 2 TIMES DAILY
Qty: 180 TABLET | Refills: 1 | Status: SHIPPED | OUTPATIENT
Start: 2021-03-23 | End: 2021-12-23

## 2021-03-30 ENCOUNTER — OFFICE VISIT (OUTPATIENT)
Dept: MEDICAL GROUP | Facility: PHYSICIAN GROUP | Age: 71
End: 2021-03-30
Payer: MEDICARE

## 2021-03-30 VITALS
RESPIRATION RATE: 16 BRPM | HEIGHT: 70 IN | DIASTOLIC BLOOD PRESSURE: 88 MMHG | OXYGEN SATURATION: 95 % | WEIGHT: 264 LBS | SYSTOLIC BLOOD PRESSURE: 126 MMHG | HEART RATE: 82 BPM | TEMPERATURE: 97.3 F | BODY MASS INDEX: 37.8 KG/M2

## 2021-03-30 DIAGNOSIS — R42 VERTIGO: ICD-10-CM

## 2021-03-30 DIAGNOSIS — H69.93 DYSFUNCTION OF BOTH EUSTACHIAN TUBES: ICD-10-CM

## 2021-03-30 PROCEDURE — 99214 OFFICE O/P EST MOD 30 MIN: CPT | Performed by: NURSE PRACTITIONER

## 2021-03-30 RX ORDER — FLUTICASONE PROPIONATE 50 MCG
SPRAY, SUSPENSION (ML) NASAL
Qty: 16 G | Refills: 3 | Status: SHIPPED | OUTPATIENT
Start: 2021-03-30

## 2021-03-30 RX ORDER — MECLIZINE HCL 12.5 MG/1
12.5 TABLET ORAL 3 TIMES DAILY PRN
Qty: 30 TABLET | Refills: 0 | Status: SHIPPED | OUTPATIENT
Start: 2021-03-30 | End: 2021-05-06 | Stop reason: SDUPTHER

## 2021-03-30 ASSESSMENT — FIBROSIS 4 INDEX: FIB4 SCORE: 1.43

## 2021-03-30 NOTE — ASSESSMENT & PLAN NOTE
Patient reports 1 month onset of intermittent vertigo.  Reports he has had vertigo in the past, about once or twice a year.  Now he is having it almost every day or every couple days.  Seems to occur randomly, but often with head turning.  Will have immediate room spinning sensation, then feels like he is on a boat for a few hours.  Also reports has chronic tinnitus, but now worsening.  Denies fever, malaise, muscle weakness, vision changes, worsening headache, sinus congestion, syncope, presyncope.  Does note that vertigo started right after going over the mountains to [place].

## 2021-03-30 NOTE — PROGRESS NOTES
CC: Vertigo    HISTORY OF THE PRESENT ILLNESS: Patient is a 71 y.o. male. This pleasant patient is here today for evaluation and management of the following health problems.      Vertigo  Patient reports 1 month onset of intermittent vertigo.  Reports he has had vertigo in the past, about once or twice a year.  Now he is having it almost every day or every couple days.  Seems to occur randomly, but often with head turning.  Will have immediate room spinning sensation, then feels like he is on a boat for a few hours.  Also reports has chronic tinnitus, but now worsening.  Denies fever, malaise, muscle weakness, vision changes, worsening headache, sinus congestion, syncope, presyncope.  Does note that vertigo started right after going over the mountains to [place].      Allergies: Magnesium oxide    Current Outpatient Medications Ordered in Epic   Medication Sig Dispense Refill   • Prenatal Multivit-Min-Fe-FA (PRE-SAMIRA PO) Take 1 capsule by mouth.     • meclizine (ANTIVERT) 12.5 MG Tab Take 1 tablet by mouth 3 times a day as needed. 30 tablet 0   • valsartan (DIOVAN) 160 MG Tab Take 1 tablet by mouth 2 times a day. Please call [phone] and schedule a follow up appointment for further refills. Thank you! 180 tablet 1   • amLODIPine (NORVASC) 10 MG Tab Take 1 Tab by mouth every day. 90 Tab 3   • metoprolol SR (TOPROL XL) 50 MG TABLET SR 24 HR Take 1 Tab by mouth 2 Times a Day. 180 Tab 3   • furosemide (LASIX) 20 MG Tab Take 1 Tab by mouth every day. 90 Tab 3   • apixaban (ELIQUIS) 5mg Tab Take 1 Tab by mouth 2 Times a Day. 180 Tab 3   • potassium chloride SA (K-DUR) 10 MEQ Tab CR Take 2 Tabs by mouth every day. 180 Tab 3   • naproxen (NAPROSYN) 500 MG Tab Take 1 Tab by mouth as needed (Occasional use, no more than 2x/week). Caution when using while taking Eliquis. 30 Tab 3   • fluocinonide (LIDEX) 0.05 % external solution Apply 0.05 Applicators to affected area(s) every day.  3   • cyanocobalamin (VITAMIN B-12)  500 MCG TABS Take 500 mcg by mouth every day.     • omeprazole (PRILOSEC) 20 MG CPDR Take 20 mg by mouth 2 times a day.     • tramadol (ULTRAM) 50 MG TABS Take  mg by mouth every four hours as needed for Severe Pain.     • ascorbic acid (ASCORBIC ACID) 500 MG TABS Take 500 mg by mouth 2 Times a Day.     • Calcium Citrate (CITRACAL PO) Take 2 Tabs by mouth 2 Times a Day.     • Multiple Vitamins-Minerals (OPTISOURCE POST BARIATRIC SURG PO) Take 1 Tab by mouth 2 Times a Day.     • Omega-3 Fatty Acids (FISH OIL) 1000 MG Cap capsule Take 2,000 mg by mouth 2 Times a Day.       No current [facility]-ordered facility-administered medications on file.       Past Medical History:   Diagnosis Date   • Arthritis     all over   • Benign hypertensive heart disease without heart failure     Disabling from his career as a , occupation related.    • Breath shortness    • Chest pain, unspecified     ~2000 with negative cardiac cath by [facility]   • Chronic anticoagulation 11/11/2016   • Cold 01/14/2018   • Diastolic dysfunction    • Head ache    • Headache, chronic daily    • Hemorrhagic disorder (HCC)     Eliquis   • High cholesterol    • Hypertension    • MR (mitral regurgitation)    • Osteoarthritis    • Paroxysmal atrial fibrillation (HCC) 2/28/2017   • Pure hypercholesterolemia     [facility]   • RLS (restless legs syndrome)        Past Surgical History:   Procedure Laterality Date   • CATARACT EXTRACTION WITH IOL  2020   • CERVICAL DISK AND FUSION ANTERIOR      2 level   • GASTRIC BYPASS LAPAROSCOPIC      With Daron-en-Y   • KNEE ARTHROPLASTY TOTAL Bilateral    • OTHER ORTHOPEDIC SURGERY  2007 note;    Bilateral, with musculoskelatal limitations       Social History     Tobacco Use   • Smoking status: Never Smoker   • Smokeless tobacco: Never Used   Substance Use Topics   • Alcohol use: Yes     Alcohol/week: 1.2 oz     Types: 2 Cans of beer per week     Comment: 2-3X WEEK   • Drug use: No       Family History  "  Problem Relation Age of Onset   • Hypertension Mother    • Cancer Mother         uterine, leukemia, skin   • Cancer Father         Esophageal   • Cancer Brother         Brain   • Hypertension Brother        ROS:   As in HPI, otherwise negative for chest pain, dyspnea, abdominal pain, dysuria, blood in stool, fever           Exam: /88 (BP Location: Right arm, Patient Position: Sitting, BP Cuff Size: Large adult)   Pulse 82   Temp 36.3 °C (97.3 °F) (Temporal)   Resp 16   Ht 1.778 m (5' 10\")   Wt 120 kg (264 lb)   SpO2 95%  Body mass index is 37.88 kg/m².    General: Alert, pleasant, well nourished, well developed male in NAD  HEENT: Normocephalic. Eyes conjunctiva clear lids without ptosis, pupils equal and reactive to light, ears normal shape and contour, canals are clear bilaterally, tympanic membranes are pearly gray with fair light reflex and cloudy fluid posteriorly, nasal mucosa without erythema and drainage, oropharynx is without erythema, edema or exudates.   Neck: Supple without bruit. Thyroid is not enlarged.  Pulmonary: Clear to ausculation.  Normal effort. No rales, ronchi, or wheezing.  Cardiovascular: Normal rate and rhythm without murmur.  Neuro: CN 2-12 tested and grossly intact, Gross sensation intact to extremities and trunk, Gait grossly normal and not antalgic  Lymph: No cervical or supraclavicular lymph nodes are palpable  Skin: Warm and dry.    Musculoskeletal: Normal gait.   Psych: Normal mood and affect. Alert and oriented. Judgment and insight is normal.    Please note that this dictation was created using voice recognition software. I have made every reasonable attempt to correct obvious errors, but I expect that there are errors of grammar and possibly content that I did not discover before finalizing the note.      Assessment/Plan  1. Vertigo  Patient having intermittent vertigo.  Most likely from benign positional vertigo and/or eustachian tube dysfunction.   Patient will use " Flonase nasal spray twice a day for 2 weeks and then daily thereafter.  Will prescribe meclizine for severe vertigo.  Instructions and side effects reviewed with patient.  We will get updated CMP and CBC to rule out other causes.  Handout on Epley maneuver given to patient today.  Did discuss referral to PT for vestibular therapy.  Patient declined.  Patient will return to clinic or message me if symptoms do not improve or worsen.  Consider referral to ENT.  - ESTIMATED GFR; Future  - CBC WITHOUT DIFFERENTIAL; Future  - meclizine (ANTIVERT) 12.5 MG Tab; Take 1 tablet by mouth 3 times a day as needed.  Dispense: 30 tablet; Refill: 0  - Comp Metabolic Panel; Future  - fluticasone (FLONASE) 50 MCG/ACT nasal spray; Administer 1 spray into each nostril twice daily for 2 weeks, then daily thereafter.  Dispense: 16 g; Refill: 3      2. Dysfunction of both eustachian tubes  As in #1  - fluticasone (FLONASE) 50 MCG/ACT nasal spray; Administer 1 spray into each nostril twice daily for 2 weeks, then daily thereafter.  Dispense: 16 g; Refill: 3

## 2021-03-30 NOTE — PATIENT INSTRUCTIONS
Start Flonase nasal spray twice a day for 2 weeks, then daily after that.    Work on Icarus Ascending maneuver    Take meclizine as needed for dizziness      Benign Positional Vertigo  Vertigo is the feeling that you or your surroundings are moving when they are not. Benign positional vertigo is the most common form of vertigo. This is usually a harmless condition (benign). This condition is positional. This means that symptoms are triggered by certain movements and positions.  This condition can be dangerous if it occurs while you are doing something that could cause harm to you or others. This includes activities such as driving or operating machinery.  What are the causes?  In many cases, the cause of this condition is not known. It may be caused by a disturbance in an area of the inner ear that helps your brain to sense movement and balance. This disturbance can be caused by:  · Viral infection (labyrinthitis).  · Head injury.  · Repetitive motion, such as jumping, dancing, or running.  What increases the risk?  You are more likely to develop this condition if:  · You are a woman.  · You are 50 years of age or older.  What are the signs or symptoms?  Symptoms of this condition usually happen when you move your head or your eyes in different directions. Symptoms may start suddenly, and usually last for less than a minute. They include:  · Loss of balance and falling.  · Feeling like you are spinning or moving.  · Feeling like your surroundings are spinning or moving.  · Nausea and vomiting.  · Blurred vision.  · Dizziness.  · Involuntary eye movement (nystagmus).  Symptoms can be mild and cause only minor problems, or they can be severe and interfere with daily life. Episodes of benign positional vertigo may return (recur) over time. Symptoms may improve over time.  How is this diagnosed?  This condition may be diagnosed based on:  · Your medical history.  · Physical exam of the head, neck, and ears.  · Tests, such  as:  ? MRI.  ? CT scan.  ? Eye movement tests. Your health care provider may ask you to change positions quickly while he or she watches you for symptoms of benign positional vertigo, such as nystagmus. Eye movement may be tested with a variety of exams that are designed to evaluate or stimulate vertigo.  ? An electroencephalogram (EEG). This records electrical activity in your brain.  ? Hearing tests.  You may be referred to a health care provider who specializes in ear, nose, and throat (ENT) problems (otolaryngologist) or a provider who specializes in disorders of the nervous system (neurologist).  How is this treated?    This condition may be treated in a session in which your health care provider moves your head in specific positions to adjust your inner ear back to normal. Treatment for this condition may take several sessions. Surgery may be needed in severe cases, but this is rare.   In some cases, benign positional vertigo may resolve on its own in 2-4 weeks.  Follow these instructions at home:  Safety  · Move slowly. Avoid sudden body or head movements or certain positions, as told by your health care provider.  · Avoid driving until your health care provider says it is safe for you to do so.  · Avoid operating heavy machinery until your health care provider says it is safe for you to do so.  · Avoid doing any tasks that would be dangerous to you or others if vertigo occurs.  · If you have trouble walking or keeping your balance, try using a cane for stability. If you feel dizzy or unstable, sit down right away.  · Return to your normal activities as told by your health care provider. Ask your health care provider what activities are safe for you.  General instructions  · Take over-the-counter and prescription medicines only as told by your health care provider.  · Drink enough fluid to keep your urine pale yellow.  · Keep all follow-up visits as told by your health care provider. This is  "important.  Contact a health care provider if:  · You have a fever.  · Your condition gets worse or you develop new symptoms.  · Your family or friends notice any behavioral changes.  · You have nausea or vomiting that gets worse.  · You have numbness or a \"pins and needles\" sensation.  Get help right away if you:  · Have difficulty speaking or moving.  · Are always dizzy.  · Faint.  · Develop severe headaches.  · Have weakness in your legs or arms.  · Have changes in your hearing or vision.  · Develop a stiff neck.  · Develop sensitivity to light.  Summary  · Vertigo is the feeling that you or your surroundings are moving when they are not. Benign positional vertigo is the most common form of vertigo.  · The cause of this condition is not known. It may be caused by a disturbance in an area of the inner ear that helps your brain to sense movement and balance.  · Symptoms include loss of balance and falling, feeling that you or your surroundings are moving, nausea and vomiting, and blurred vision.  · This condition can be diagnosed based on symptoms, physical exam, and other tests, such as MRI, CT scan, eye movement tests, and hearing tests.  · Follow safety instructions as told by your health care provider. You will also be told when to contact your health care provider in case of problems.  This information is not intended to replace advice given to you by your health care provider. Make sure you discuss any questions you have with your health care provider.  Document Released: 09/25/2007 Document Revised: 05/29/2019 Document Reviewed: 05/29/2019  [employer] Patient Education © 2020 [employer]    "

## 2021-04-29 DIAGNOSIS — R07.89 OTHER CHEST PAIN: ICD-10-CM

## 2021-04-29 RX ORDER — NAPROXEN 500 MG/1
500 TABLET ORAL PRN
Qty: 90 TABLET | Refills: 1 | Status: CANCELLED | OUTPATIENT
Start: 2021-04-29

## 2021-04-29 RX ORDER — NAPROXEN 500 MG/1
500 TABLET ORAL PRN
Qty: 30 TABLET | Refills: 3 | OUTPATIENT
Start: 2021-04-29

## 2021-05-06 ENCOUNTER — OFFICE VISIT (OUTPATIENT)
Dept: MEDICAL GROUP | Facility: PHYSICIAN GROUP | Age: 71
End: 2021-05-06
Payer: MEDICARE

## 2021-05-06 VITALS
RESPIRATION RATE: 16 BRPM | DIASTOLIC BLOOD PRESSURE: 80 MMHG | OXYGEN SATURATION: 94 % | SYSTOLIC BLOOD PRESSURE: 124 MMHG | HEIGHT: 70 IN | BODY MASS INDEX: 38.37 KG/M2 | HEART RATE: 82 BPM | WEIGHT: 268 LBS | TEMPERATURE: 97.2 F

## 2021-05-06 DIAGNOSIS — R51.9 CHRONIC INTRACTABLE HEADACHE, UNSPECIFIED HEADACHE TYPE: ICD-10-CM

## 2021-05-06 DIAGNOSIS — R42 VERTIGO: ICD-10-CM

## 2021-05-06 DIAGNOSIS — H55.00 NYSTAGMUS: ICD-10-CM

## 2021-05-06 DIAGNOSIS — G89.29 CHRONIC INTRACTABLE HEADACHE, UNSPECIFIED HEADACHE TYPE: ICD-10-CM

## 2021-05-06 PROCEDURE — 99214 OFFICE O/P EST MOD 30 MIN: CPT | Performed by: NURSE PRACTITIONER

## 2021-05-06 RX ORDER — MECLIZINE HCL 12.5 MG/1
12.5 TABLET ORAL 3 TIMES DAILY PRN
Qty: 30 TABLET | Refills: 1 | Status: SHIPPED | OUTPATIENT
Start: 2021-05-06 | End: 2021-06-23

## 2021-05-06 ASSESSMENT — FIBROSIS 4 INDEX: FIB4 SCORE: 1.43

## 2021-05-06 NOTE — PROGRESS NOTES
"CC: Vertigo, ER follow-up    HISTORY OF THE PRESENT ILLNESS: Patient is a 71 y.o. male. This pleasant patient is here today, accompanied by daughter, for evaluation and management of the following health problems.    Health Maintenance: Patient reports last colonoscopy was done 5 years ago in [facility].  Reports 10-year recall.  Patient reports he had Moderna vaccine in mid March and mid April.      Vertigo  Patient has been having intermittent vertigo since the beginning of March.  Did trial meclizine with some relief.  However, experience vertigo while at the grocery store a week ago, lost his balance and hit his head.  It was evaluated in the [place] ER for laceration and head injury.  I have reviewed notes.  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  Since ER visit 10 days ago, patient reports intermittent episodes of vertigo.  Has not had episode in 3 days.  Patient wonders if vertigo is related to Covid vaccine.  Started 2 weeks after first Moderna Covid vaccine.  Since last appointment, vertigo episodes have changed.  Now more like \"I am on a boat; the room is not spinning anymore.\"  Patient does have history of chronic headache for which he takes tramadol occasionally for.  [facility]  Does have history of cervical spine fusion.  Denies syncope, presyncope, muscle weakness, tingling.        Allergies: Magnesium oxide    Current Outpatient Medications Ordered in Epic   Medication Sig Dispense Refill   • Homeopathic Products (CLEAR TINNITUS PO) Take  by mouth.     • meclizine (ANTIVERT) 12.5 MG Tab Take 1 tablet by mouth 3 times a day as needed. 30 tablet 1   • Prenatal Multivit-Min-Fe-FA (PRE- PO) Take 1 capsule by mouth.     • fluticasone (FLONASE) 50 MCG/ACT nasal spray Administer 1 spray into each nostril twice daily for 2 weeks, then daily thereafter. 16 g 3   • valsartan " (DIOVAN) 160 MG Tab Take 1 tablet by mouth 2 times a day. Please call [phone] and schedule a follow up appointment for further refills. Thank you! 180 tablet 1   • amLODIPine (NORVASC) 10 MG Tab Take 1 Tab by mouth every day. 90 Tab 3   • metoprolol SR (TOPROL XL) 50 MG TABLET SR 24 HR Take 1 Tab by mouth 2 Times a Day. 180 Tab 3   • furosemide (LASIX) 20 MG Tab Take 1 Tab by mouth every day. 90 Tab 3   • apixaban (ELIQUIS) 5mg Tab Take 1 Tab by mouth 2 Times a Day. 180 Tab 3   • potassium chloride SA (K-DUR) 10 MEQ Tab CR Take 2 Tabs by mouth every day. 180 Tab 3   • naproxen (NAPROSYN) 500 MG Tab Take 1 Tab by mouth as needed (Occasional use, no more than 2x/week). Caution when using while taking Eliquis. 30 Tab 3   • fluocinonide (LIDEX) 0.05 % external solution Apply 0.05 Applicators to affected area(s) every day.  3   • Multiple Vitamins-Minerals (OPTISOURCE POST BARIATRIC SURG PO) Take 1 Tab by mouth 2 Times a Day.     • cyanocobalamin (VITAMIN B-12) 500 MCG TABS Take 500 mcg by mouth every day.     • omeprazole (PRILOSEC) 20 MG CPDR Take 20 mg by mouth 2 times a day.     • tramadol (ULTRAM) 50 MG TABS Take  mg by mouth every four hours as needed for Severe Pain.     • ascorbic acid (ASCORBIC ACID) 500 MG TABS Take 500 mg by mouth 2 Times a Day.     • Calcium Citrate (CITRACAL PO) Take 2 Tabs by mouth 2 Times a Day.     • Omega-3 Fatty Acids (FISH OIL) 1000 MG Cap capsule Take 2,000 mg by mouth 2 Times a Day.       No current [facility]-ordered facility-administered medications on file.       Past Medical History:   Diagnosis Date   • Arthritis     all over   • Benign hypertensive heart disease without heart failure     Disabling from his career as a , occupation related.    • Breath shortness    • Chest pain, unspecified     ~2000 with negative cardiac cath by [facility]   • Chronic anticoagulation 11/11/2016   • Cold 01/14/2018   • Diastolic dysfunction    • Head ache    • Headache, chronic  "daily    • Hemorrhagic disorder (HCC)     Eliquis   • High cholesterol    • Hypertension    • MR (mitral regurgitation)    • Osteoarthritis    • Paroxysmal atrial fibrillation (HCC) 2/28/2017   • Pure hypercholesterolemia     [facility]   • RLS (restless legs syndrome)        Past Surgical History:   Procedure Laterality Date   • CATARACT EXTRACTION WITH IOL  2020   • CERVICAL DISK AND FUSION ANTERIOR      2 level   • GASTRIC BYPASS LAPAROSCOPIC      With Daron-en-Y   • KNEE ARTHROPLASTY TOTAL Bilateral    • OTHER ORTHOPEDIC SURGERY  2007 note;    Bilateral, with musculoskelatal limitations       Social History     Tobacco Use   • Smoking status: Never Smoker   • Smokeless tobacco: Never Used   Substance Use Topics   • Alcohol use: Yes     Alcohol/week: 1.2 oz     Types: 2 Cans of beer per week     Comment: 2-3X WEEK   • Drug use: No       Family History   Problem Relation Age of Onset   • Hypertension Mother    • Cancer Mother         uterine, leukemia, skin   • Cancer Father         Esophageal   • Cancer Brother         Brain   • Hypertension Brother        ROS:   As in HPI, otherwise negative for chest pain, dyspnea, abdominal pain, dysuria, blood in stool, fever           Exam: /80 (BP Location: Left arm, Patient Position: Sitting, BP Cuff Size: Adult long)   Pulse 82   Temp 36.2 °C (97.2 °F) (Temporal)   Resp 16   Ht 1.778 m (5' 10\")   Wt 122 kg (268 lb)   SpO2 94%  Body mass index is 38.45 kg/m².    General: Alert, pleasant, well nourished, well developed male in NAD  HEENT: Normocephalic. Eyes conjunctiva clear lids without ptosis, pupils equal and reactive to light, ears normal shape and contour, canals are clear bilaterally, tympanic membranes are pearly gray with good light reflex, nasal mucosa without erythema and drainage, oropharynx is without erythema, edema or exudates.   Neck: Supple without bruit. Thyroid is not enlarged.  Pulmonary: Clear to ausculation.  Normal effort. No rales, " ronchi, or wheezing.  Cardiovascular: Normal rate and rhythm without murmur.  No lower extremity edema.  Neuro: CN 2-12 tested and grossly intact, positive bilateral nystagmus, strength testing in upper and lower extremities is 5/5 and equal bilaterally, Gross motor movement intact in all 4 extremities, Gross sensation intact to extremities and trunk, Gait grossly normal and not antalgic  Lymph: No cervical or supraclavicular lymph nodes are palpable  Skin: Warm and dry.   Musculoskeletal: Normal gait.   Psych: Normal mood and affect. Alert and oriented. Judgment and insight is normal.    Please note that this dictation was created using voice recognition software. I have made every reasonable attempt to correct obvious errors, but I expect that there are errors of grammar and possibly content that I did not discover before finalizing the note.      Assessment/Plan  1. Vertigo  Patient continues with intermittent vertigo.  Not certain if it is due to Covid vaccination.  I did read in up-to-date that there was some post authorization findings of dizziness following immunization.  Has history of chronic intractable headache.  CT scan shows likely microangiopathic ischemia, not on statin.  Will refer to neurology for further evaluation.  In the meantime, patient will be cautious with movements and leaning over or turning his head.  We will also take meclizine as needed.  Patient and daughter verbalized understanding.  CMP and CBC from ER visit without abnormalities.  - REFERRAL TO NEUROLOGY  - meclizine (ANTIVERT) 12.5 MG Tab; Take 1 tablet by mouth 3 times a day as needed.  Dispense: 30 tablet; Refill: 1    2. Chronic intractable headache, unspecified headache type  As in #1  - REFERRAL TO NEUROLOGY    3. Nystagmus    - REFERRAL TO NEUROLOGY

## 2021-05-06 NOTE — ASSESSMENT & PLAN NOTE
"Patient has been having intermittent vertigo since the beginning of March.  Did trial meclizine with some relief.  However, experience vertigo while at the grocery store a week ago, lost his balance and hit his head.  It was evaluated in the [place] ER for laceration and head injury.  I have reviewed notes.  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  Since ER visit 10 days ago, patient reports intermittent episodes of vertigo.  Has not had episode in 3 days.  Patient wonders if vertigo is related to Covid vaccine.  Started 2 weeks after first Moderna Covid vaccine.  Since last appointment, vertigo episodes have changed.  Now more like \"I am on a boat; the room is not spinning anymore.\"  Patient does have history of chronic headache for which he takes tramadol occasionally for.  [facility]  Does have history of cervical spine fusion.  Denies syncope, presyncope, muscle weakness, tingling.    "

## 2021-06-08 ENCOUNTER — OFFICE VISIT (OUTPATIENT)
Dept: NEUROLOGY | Facility: MEDICAL CENTER | Age: 71
End: 2021-06-08
Attending: PSYCHIATRY & NEUROLOGY
Payer: MEDICARE

## 2021-06-08 VITALS
RESPIRATION RATE: 16 BRPM | WEIGHT: 266.54 LBS | HEIGHT: 71 IN | DIASTOLIC BLOOD PRESSURE: 78 MMHG | TEMPERATURE: 97.8 F | SYSTOLIC BLOOD PRESSURE: 120 MMHG | OXYGEN SATURATION: 98 % | HEART RATE: 69 BPM | BODY MASS INDEX: 37.31 KG/M2

## 2021-06-08 DIAGNOSIS — R42 VERTIGO: ICD-10-CM

## 2021-06-08 PROCEDURE — 99212 OFFICE O/P EST SF 10 MIN: CPT | Performed by: PSYCHIATRY & NEUROLOGY

## 2021-06-08 PROCEDURE — 99204 OFFICE O/P NEW MOD 45 MIN: CPT | Performed by: PSYCHIATRY & NEUROLOGY

## 2021-06-08 ASSESSMENT — FIBROSIS 4 INDEX: FIB4 SCORE: 1.43

## 2021-06-08 NOTE — PROGRESS NOTES
"[facility] NEUROLOGY  NEW PATIENT VISIT    Referral source: KATELIN Torres    CC: \"vertigo, chronic headache, nystagmus\"    HISTORY OF ILLNESS:  Warren Templeton is a 71 y.o. man with a history most notable for chronic tension-type headache, HTN, HLD, AF, chronic anticoagulation, and vertigo.  Today, he was accompanied by his wife, and he provided the following history:    2016:  Upon awakening Warren experienced \"vertigo.\"  He perceived a \"spinning\" sensation.  There was severe nausea, and he had \"dry heaves.\"  Symptoms persisted for ~1-2 days, and afterward he returned to normal.    3/1/2021:  Warren was driving in [place].  Suddenly, everything started \"spinning.\"  He told his wife to grab the steering wheel.  He was very nauseated.  This episode lasted \"a few days.\"  Later, the vertigo resolved.  Every 3-4 days Warren experiences a \"bout\" of spinning.  Each subsequent episode lasts ~1 day.  Symptoms are present \"intermittently\" throughout the day.  The dizziness can arise when he is in any position (lying, sitting, standing).    There have not been any changes in his hearing.  There is tinnitus, which is a longstanding issue.  There was never double vision.  He doesn't feel \"lightheaded.\"  Sometimes during a spell he will feel \"warm.\"    MEDICAL AND SURGICAL HISTORY:  Past Medical History:   Diagnosis Date   • Arthritis     all over   • Benign hypertensive heart disease without heart failure     Disabling from his career as a , occupation related.    • Breath shortness    • Chest pain, unspecified     ~2000 with negative cardiac cath by [facility]   • Chronic anticoagulation 11/11/2016   • Cold 01/14/2018   • Diastolic dysfunction    • Head ache    • Headache, chronic daily    • Hemorrhagic disorder (HCC)     Eliquis   • High cholesterol    • Hypertension    • MR (mitral regurgitation)    • Osteoarthritis    • Paroxysmal atrial fibrillation (HCC) 2/28/2017   • Pure " OB/GYN Postpartum Note      S:  Patient is feeling well this morning.  Lochia = menses.  Eating and drinking without nausea.  Ambulating without difficulty. Pain is well controlled. Is bottle feeding because baby has low blood sugars but ultimately would like to breastfeed. Hasn't started pumping yet.    O:   Vitals:    20 0505 20 0520 20 0605 20 0809   BP: 125/69 127/69 121/58 122/61   Resp:    16   Temp:   96.6  F (35.9  C) 96.9  F (36.1  C)   TempSrc:       SpO2:         General: resting in bed, in NAD  Resp: nonlabored  Psych: appropriate mood and affect    Hemoglobin   Date Value Ref Range Status   2020 10.4 (L) 11.7 - 15.7 g/dL Final   ]    A: Ms. Sofya Brown is a 35 year old  PPD #0 s/p     P:  Will start pumping now, can try breastfeeding as she desires    Claudia Morfin MD  OB/GYN  2020 5:31 PM      hypercholesterolemia     [facility]   • RLS (restless legs syndrome)      Past Surgical History:   Procedure Laterality Date   • CATARACT EXTRACTION WITH IOL     • CERVICAL DISK AND FUSION ANTERIOR      2 level   • GASTRIC BYPASS LAPAROSCOPIC      With Daron-en-Y   • KNEE ARTHROPLASTY TOTAL Bilateral    • OTHER ORTHOPEDIC SURGERY   note;    Bilateral, with musculoskelatal limitations     MEDICATIONS:  Current Outpatient Medications   Medication Sig   • Homeopathic Products (CLEAR TINNITUS PO) Take  by mouth.   • meclizine (ANTIVERT) 12.5 MG Tab Take 1 tablet by mouth 3 times a day as needed.   • Prenatal Multivit-Min-Fe-FA (PRE-SAMIRA PO) Take 1 capsule by mouth.   • fluticasone (FLONASE) 50 MCG/ACT nasal spray Administer 1 spray into each nostril twice daily for 2 weeks, then daily thereafter.   • valsartan (DIOVAN) 160 MG Tab Take 1 tablet by mouth 2 times a day. Please call [phone] and schedule a follow up appointment for further refills. Thank you!   • amLODIPine (NORVASC) 10 MG Tab Take 1 Tab by mouth every day.   • metoprolol SR (TOPROL XL) 50 MG TABLET SR 24 HR Take 1 Tab by mouth 2 Times a Day.   • furosemide (LASIX) 20 MG Tab Take 1 Tab by mouth every day.   • apixaban (ELIQUIS) 5mg Tab Take 1 Tab by mouth 2 Times a Day.   • potassium chloride SA (K-DUR) 10 MEQ Tab CR Take 2 Tabs by mouth every day.   • naproxen (NAPROSYN) 500 MG Tab Take 1 Tab by mouth as needed (Occasional use, no more than 2x/week). Caution when using while taking Eliquis.   • fluocinonide (LIDEX) 0.05 % external solution Apply 0.05 Applicators to affected area(s) every day.   • Multiple Vitamins-Minerals (OPTISOURCE POST BARIATRIC SURG PO) Take 1 Tab by mouth 2 Times a Day.   • cyanocobalamin (VITAMIN B-12) 500 MCG TABS Take 500 mcg by mouth every day.   • omeprazole (PRILOSEC) 20 MG CPDR Take 20 mg by mouth 2 times a day.   • tramadol (ULTRAM) 50 MG TABS Take  mg by mouth every four hours as needed for Severe Pain.    • ascorbic acid (ASCORBIC ACID) 500 MG TABS Take 500 mg by mouth 2 Times a Day.   • Calcium Citrate (CITRACAL PO) Take 2 Tabs by mouth 2 Times a Day.   • Omega-3 Fatty Acids (FISH OIL) 1000 MG Cap capsule Take 2,000 mg by mouth 2 Times a Day. (Patient not taking: Reported on 6/8/2021)     SOCIAL HISTORY:  Social History     Tobacco Use   • Smoking status: Never Smoker   • Smokeless tobacco: Never Used   Substance Use Topics   • Alcohol use: Yes     Alcohol/week: 1.2 oz     Types: 2 Cans of beer per week     Comment: 2-3X WEEK     Social History     Social History Narrative   • Not on file     FAMILY HISTORY:  Family History   Problem Relation Age of Onset   • Hypertension Mother    • Cancer Mother         uterine, leukemia, skin   • Cancer Father         Esophageal   • Cancer Brother         Brain   • Hypertension Brother      REVIEW OF SYSTEMS:  A ROS was completed.  Pertinent positives and negatives were included in the HPI, above.  All other systems were reviewed and are negative.    PHYSICAL EXAM:  General/Medical:  - NAD  - hair, skin, nails, and joints were normal    Neuro:  MENTAL STATUS: awake and alert; no deficits of speech or language; oriented to person, place, and time; affect was appropriate to situation; pleasant, cooperative    CRANIAL NERVES:    II: acuity was: J1+/J1+ with glasses; fields intact to confrontation; pupils 3/3 to 2/2 without a relative afferent pupillary defect; discs sharp    III/IV/VI: versions intact without nystagmus; exophoria, bilaterally    V: facial sensation symmetric to light touch    VII: facial expression symmetric    VIII: hearing intact to finger rub; Davida-Hallpike maneuver was negative, bilaterally    IX/X: palate elevates symmetrically    XI: shoulder shrug symmetric    XII: tongue midline    MOTOR:  - bulk and tone were normal throughout  Upper Extremity Strength  (R/L)    5/5   Elbow flexion 5/5   Elbow extension 5/5   Shoulder abduction 5/5     Lower Extremity  "Strength  (R/L)   Hip flexion 5/5   Knee extension 5/5   Knee flexion 5/5   Ankle plantarflexion 5/5   Ankle dorsiflexion 5/5     - no pronator drift; no abnormal movements    SENSATION:  - light touch: intact over the upper and lower extremities  - vibration (R/L, seconds): NT at the great toes  - pinprick: NT  - proprioception: NT  - Romberg: absent    COORDINATION:  - finger to nose was normal, no ataxia on exam  - finger tapping was rapid and accurate, bilaterally    REFLEXES:  Reflex Right Left   BR 1+ 1+   Biceps 1+ 1+   Triceps 1+ 1+   Patellae 1+ 1+   Achilles NT NT   Toes NT NT     GAIT:  - narrow base      REVIEW OF IMAGING STUDIES:  No brain imaging available for review.    REVIEW OF LABORATORY STUDIES:  No recent data available for review.    ASSESSMENT:  Warren Templeton is a 71 y.o. man with possible BPPV and a history otherwise notable for chronic tension-type headache, HTN, HLD, AF, and chronic anticoagulation.  Cynthia symptoms of \"vertigo\" are a bit difficult to characterize, though they are probably most consistent with benign paroxysmal positional vertigo (BPPV).  Of note, Herndon-Hallpike maneuver was negative on today's exam.  Still, I think this is the most likely explanation.  I provided Warren with a explanation regarding the etiology of BPPV and as well as instructions for performing the modified Epley maneuvers.  He will perform maneuvers 3 times daily when symptoms are present and pause once he has been free of vertigo for 24 hours.  We will follow-up in approximately 2 months to assess the effectiveness of this plan.    PLAN:  Possible BPPV:  - Modified Epley Maneuvers    Follow-Up:  - Return in about 2 months (around 8/8/2021).    Signed: Sergio Martins M.D. at 2:43 PM on 06/08/21    BILLING DOCUMENTATION:   I spent 45 minutes reviewing the medical record, interviewing and examining the patient, discussing my impression (see \"assessment\" above), and coordinating care.  "

## 2021-06-17 DIAGNOSIS — I51.89 DIASTOLIC DYSFUNCTION: ICD-10-CM

## 2021-06-17 DIAGNOSIS — R42 VERTIGO: ICD-10-CM

## 2021-06-17 DIAGNOSIS — I11.9 BENIGN HYPERTENSIVE HEART DISEASE WITHOUT HEART FAILURE: ICD-10-CM

## 2021-06-18 DIAGNOSIS — I48.0 PAROXYSMAL ATRIAL FIBRILLATION (HCC): ICD-10-CM

## 2021-06-21 DIAGNOSIS — I11.9 BENIGN HYPERTENSIVE HEART DISEASE WITHOUT HEART FAILURE: ICD-10-CM

## 2021-06-21 DIAGNOSIS — I48.0 PAROXYSMAL ATRIAL FIBRILLATION (HCC): ICD-10-CM

## 2021-06-21 DIAGNOSIS — I51.89 DIASTOLIC DYSFUNCTION: ICD-10-CM

## 2021-06-21 RX ORDER — AMLODIPINE BESYLATE 10 MG/1
TABLET ORAL
Qty: 90 TABLET | Refills: 0 | Status: SHIPPED | OUTPATIENT
Start: 2021-06-21 | End: 2021-09-20

## 2021-06-21 RX ORDER — METOPROLOL SUCCINATE 50 MG/1
50 TABLET, EXTENDED RELEASE ORAL 2 TIMES DAILY
Qty: 180 TABLET | Refills: 0 | Status: SHIPPED | OUTPATIENT
Start: 2021-06-21 | End: 2021-09-20

## 2021-06-21 RX ORDER — APIXABAN 5 MG/1
TABLET, FILM COATED ORAL
Qty: 180 TABLET | Refills: 0 | OUTPATIENT
Start: 2021-06-21

## 2021-06-21 NOTE — TELEPHONE ENCOUNTER
Please check with patient to see if he is really needing a refill of meclizine.  If so, how often is he needing to take it?

## 2021-06-22 NOTE — TELEPHONE ENCOUNTER
Pt stated he is almost out of medication, pt is taking it as needed, stating the episodes are getting further apart each time he takes it. Pt is unsure of an average amount he is taking, but states he has about 8-9 more pills left on most recent RX fill.

## 2021-06-23 RX ORDER — MECLIZINE HCL 12.5 MG/1
TABLET ORAL
Qty: 30 TABLET | Refills: 1 | Status: SHIPPED | OUTPATIENT
Start: 2021-06-23

## 2021-06-29 ENCOUNTER — OFFICE VISIT (OUTPATIENT)
Dept: MEDICAL GROUP | Facility: PHYSICIAN GROUP | Age: 71
End: 2021-06-29
Payer: MEDICARE

## 2021-06-29 VITALS — RESPIRATION RATE: 16 BRPM | WEIGHT: 269.2 LBS | BODY MASS INDEX: 38.54 KG/M2 | HEIGHT: 70 IN

## 2021-06-29 DIAGNOSIS — I48.0 PAROXYSMAL ATRIAL FIBRILLATION (HCC): ICD-10-CM

## 2021-06-29 DIAGNOSIS — E78.00 HYPERCHOLESTEROLEMIA: ICD-10-CM

## 2021-06-29 DIAGNOSIS — R73.09 ELEVATED GLUCOSE: ICD-10-CM

## 2021-06-29 DIAGNOSIS — F40.243 FEAR OF FLYING: ICD-10-CM

## 2021-06-29 PROCEDURE — 99213 OFFICE O/P EST LOW 20 MIN: CPT | Performed by: NURSE PRACTITIONER

## 2021-06-29 RX ORDER — ALPRAZOLAM 0.25 MG/1
0.25 TABLET ORAL NIGHTLY PRN
Qty: 15 TABLET | Refills: 0 | Status: SHIPPED | OUTPATIENT
Start: 2021-06-29 | End: 2021-08-28

## 2021-06-29 ASSESSMENT — FIBROSIS 4 INDEX: FIB4 SCORE: 1.43

## 2021-06-29 NOTE — ASSESSMENT & PLAN NOTE
Chronic health problem, managed by cardiology Dr. Ya, under Worker's Compensation.  Taking Eliquis, metoprolol SR, valsartan, amlodipine, furosemide.  History of cardiac ablation.  Denies any concerns.

## 2021-06-29 NOTE — ASSESSMENT & PLAN NOTE
Patient will be flying to [place] in 4 days.  Reports worsening fear of flying.  Not able to drink alcohol during flight.  He is wanting to have a prescription for something to help with his nerves while flying.  Has not taken alprazolam in the past.  He does take tramadol once every few days for pain.  He will also be flying to [place] off-and-on over the next few months to see his daughter.

## 2021-06-29 NOTE — PROGRESS NOTES
CC: Medication for fear of flying    HISTORY OF THE PRESENT ILLNESS: Patient is a 71 y.o. male. This pleasant patient is here today for evaluation and management of the following health problems.      Fear of flying  Patient will be flying to [place] in 4 days.  Reports worsening fear of flying.  Not able to drink alcohol during flight.  He is wanting to have a prescription for something to help with his nerves while flying.  Has not taken alprazolam in the past.  He does take tramadol once every few days for pain.  He will also be flying to [place] off-and-on over the next few months to see his daughter.    Atrial fibrillation (CMS-HCC) (HCC)  Chronic health problem, managed by cardiology Dr. Ya, under Worker's Compensation.  Taking Eliquis, metoprolol SR, valsartan, amlodipine, furosemide.  History of cardiac ablation.  Denies any concerns.      Allergies: Magnesium oxide    Current Outpatient Medications Ordered in Epic   Medication Sig Dispense Refill   • ALPRAZolam (XANAX) 0.25 MG Tab Take 1 tablet by mouth at bedtime as needed for Sleep for up to 60 days. 15 tablet 0   • meclizine (ANTIVERT) 12.5 MG Tab TAKE 1 TABLET BY MOUTH THREE TIMES DAILY AS NEEDED 30 tablet 1   • amLODIPine (NORVASC) 10 MG Tab TAKE 1 TABLET BY MOUTH EVERY DAY 90 tablet 0   • metoprolol SR (TOPROL XL) 50 MG TABLET SR 24 HR Take 1 tablet by mouth 2 times a day. 180 tablet 0   • apixaban (ELIQUIS) 5mg Tab Take 1 tablet by mouth 2 times a day. 180 tablet 0   • Homeopathic Products (CLEAR TINNITUS PO) Take  by mouth.     • Prenatal Multivit-Min-Fe-FA (PRE-SAMIRA PO) Take 1 capsule by mouth.     • fluticasone (FLONASE) 50 MCG/ACT nasal spray Administer 1 spray into each nostril twice daily for 2 weeks, then daily thereafter. 16 g 3   • valsartan (DIOVAN) 160 MG Tab Take 1 tablet by mouth 2 times a day. Please call [phone] and schedule a follow up appointment for further refills. Thank you! 180 tablet 1   • furosemide (LASIX) 20 MG Tab  Take 1 Tab by mouth every day. 90 Tab 3   • potassium chloride SA (K-DUR) 10 MEQ Tab CR Take 2 Tabs by mouth every day. 180 Tab 3   • naproxen (NAPROSYN) 500 MG Tab Take 1 Tab by mouth as needed (Occasional use, no more than 2x/week). Caution when using while taking Eliquis. 30 Tab 3   • fluocinonide (LIDEX) 0.05 % external solution Apply 0.05 Applicators to affected area(s) every day.  3   • Multiple Vitamins-Minerals (OPTISOURCE POST BARIATRIC SURG PO) Take 1 Tab by mouth 2 Times a Day.     • cyanocobalamin (VITAMIN B-12) 500 MCG TABS Take 500 mcg by mouth every day.     • omeprazole (PRILOSEC) 20 MG CPDR Take 20 mg by mouth 2 times a day.     • tramadol (ULTRAM) 50 MG TABS Take  mg by mouth every four hours as needed for Severe Pain.     • ascorbic acid (ASCORBIC ACID) 500 MG TABS Take 500 mg by mouth 2 Times a Day.     • Calcium Citrate (CITRACAL PO) Take 2 Tabs by mouth 2 Times a Day.     • Omega-3 Fatty Acids (FISH OIL) 1000 MG Cap capsule Take 2,000 mg by mouth 2 Times a Day. (Patient not taking: Reported on 6/8/2021)       No current [facility]-ordered facility-administered medications on file.       Past Medical History:   Diagnosis Date   • Arthritis     all over   • Benign hypertensive heart disease without heart failure     Disabling from his career as a , occupation related.    • Breath shortness    • Chest pain, unspecified     ~[facility]   • Chronic anticoagulation 11/11/2016   • Cold 01/14/2018   • Diastolic dysfunction    • Head ache    • Headache, chronic daily    • Hemorrhagic disorder (HCC)     Eliquis   • High cholesterol    • Hypertension    • MR (mitral regurgitation)    • Osteoarthritis    • Paroxysmal atrial fibrillation (HCC) 2/28/2017   • Pure hypercholesterolemia     [facility]   • RLS (restless legs syndrome)        Past Surgical History:   Procedure Laterality Date   • CATARACT EXTRACTION WITH IOL  2020   • CERVICAL DISK AND FUSION  "ANTERIOR      2 level   • GASTRIC BYPASS LAPAROSCOPIC      With Daron-en-Y   • KNEE ARTHROPLASTY TOTAL Bilateral    • OTHER ORTHOPEDIC SURGERY  2007 note;    Bilateral, with musculoskelatal limitations       Social History     Tobacco Use   • Smoking status: Never Smoker   • Smokeless tobacco: Never Used   Vaping Use   • Vaping Use: Never used   Substance Use Topics   • Alcohol use: Yes     Alcohol/week: 1.2 oz     Types: 2 Cans of beer per week     Comment: 2-3X WEEK   • Drug use: No       Family History   Problem Relation Age of Onset   • Hypertension Mother    • Cancer Mother         uterine, leukemia, skin   • Cancer Father         Esophageal   • Cancer Brother         Brain   • Hypertension Brother        ROS:   As in HPI, otherwise negative for chest pain, dyspnea, abdominal pain, dysuria, blood in stool, fever           Exam: Resp 16   Ht 1.778 m (5' 10\")   Wt 122 kg (269 lb 3.2 oz)  Body mass index is 38.63 kg/m².    General: Alert, pleasant, well nourished, well developed male in NAD  Neck: Supple   Pulmonary: Clear to ausculation.  Normal effort. No rales, ronchi, or wheezing.  Cardiovascular: Normal rate and rhythm without murmur.   Neurologic: Grossly nonfocal  Skin: Warm and dry.   Musculoskeletal: Normal gait.   Psych: Normal mood and affect. Alert and oriented. Judgment and insight is normal.    Please note that this dictation was created using voice recognition software. I have made every reasonable attempt to correct obvious errors, but I expect that there are errors of grammar and possibly content that I did not discover before finalizing the note.      Assessment/Plan  1. Fear of flying  Patient has anxiety with flying.  Not able to drink alcohol while on the flight, which has helped somewhat past.  Will prescribe alprazolam to take half hour to an hour before flight.  He understands not to drink alcohol or take tramadol within 4 hours of taking medication.  He will trial medication at home prior " to taking before flight.  He is advised not to drive or operate heavy machinery.  Advised on including respiratory depression and that risks are increased when taking with opioid or alcohol.  Patient verbalizes understanding.  Reviewed controlled substance agreement.  Patient signed.  Patient understands he will need to come in for appointment for any refills.   reviewed.  - ALPRAZolam (XANAX) 0.25 MG Tab; Take 1 tablet by mouth at bedtime as needed for Sleep for up to 60 days.  Dispense: 15 tablet; Refill: 0  - Controlled Substance Treatment Agreement    2. Elevated glucose  We will get hemoglobin A1c with next set of labs.  - HEMOGLOBIN A1C; Future    3. Hypercholesterolemia  We will add lipid profile to next set of labs.  - Lipid Profile; Future    4. Paroxysmal atrial fibrillation (HCC)  Well-controlled.  Continue with medications and follow-up with cardiology.

## 2021-06-30 ENCOUNTER — TELEPHONE (OUTPATIENT)
Dept: MEDICAL GROUP | Facility: PHYSICIAN GROUP | Age: 71
End: 2021-06-30

## 2021-06-30 NOTE — TELEPHONE ENCOUNTER
FINAL PRIOR AUTHORIZATION STATUS:    1.  Name of Medication & Dose: Alprazolam     2. Prior Auth Status: Approved through [employer]/ cover my meds     3. Action Taken: Pharmacy Notified: N\A Patient Notified: N\A

## 2021-06-30 NOTE — TELEPHONE ENCOUNTER
MEDICATION PRIOR AUTHORIZATION NEEDED:    1. Name of Medication: Alprazolam     2. Requested By (Name of Pharmacy): [facility]    3. Is insurance on file current? yes    4. What is the name & phone number of the 3rd party payor?  [id number]

## 2021-07-12 DIAGNOSIS — I48.0 PAROXYSMAL ATRIAL FIBRILLATION (HCC): ICD-10-CM

## 2021-07-14 RX ORDER — NAPROXEN 500 MG/1
TABLET ORAL
Qty: 90 TABLET | Refills: 0 | Status: SHIPPED | OUTPATIENT
Start: 2021-07-14 | End: 2021-10-08

## 2021-07-16 DIAGNOSIS — I48.0 PAROXYSMAL ATRIAL FIBRILLATION (HCC): ICD-10-CM

## 2021-07-16 DIAGNOSIS — I51.89 DIASTOLIC DYSFUNCTION: ICD-10-CM

## 2021-07-16 DIAGNOSIS — I11.9 BENIGN HYPERTENSIVE HEART DISEASE WITHOUT HEART FAILURE: ICD-10-CM

## 2021-07-20 ENCOUNTER — HOSPITAL ENCOUNTER (OUTPATIENT)
Dept: LAB | Facility: MEDICAL CENTER | Age: 71
End: 2021-07-20
Attending: NURSE PRACTITIONER
Payer: MEDICARE

## 2021-07-20 DIAGNOSIS — R42 VERTIGO: ICD-10-CM

## 2021-07-20 DIAGNOSIS — E78.00 HYPERCHOLESTEROLEMIA: ICD-10-CM

## 2021-07-20 DIAGNOSIS — R73.09 ELEVATED GLUCOSE: ICD-10-CM

## 2021-07-20 LAB
ERYTHROCYTE [DISTWIDTH] IN BLOOD BY AUTOMATED COUNT: 47.5 FL (ref 35.9–50)
EST. AVERAGE GLUCOSE BLD GHB EST-MCNC: 137 MG/DL
HBA1C MFR BLD: 6.4 % (ref 4–5.6)
HCT VFR BLD AUTO: 40.4 % (ref 42–52)
HGB BLD-MCNC: 13.2 G/DL (ref 14–18)
MCH RBC QN AUTO: 29.7 PG (ref 27–33)
MCHC RBC AUTO-ENTMCNC: 32.7 G/DL (ref 33.7–35.3)
MCV RBC AUTO: 91 FL (ref 81.4–97.8)
PLATELET # BLD AUTO: 221 K/UL (ref 164–446)
PMV BLD AUTO: 10.5 FL (ref 9–12.9)
RBC # BLD AUTO: 4.44 M/UL (ref 4.7–6.1)
WBC # BLD AUTO: 5.6 K/UL (ref 4.8–10.8)

## 2021-07-20 PROCEDURE — 83036 HEMOGLOBIN GLYCOSYLATED A1C: CPT

## 2021-07-20 PROCEDURE — 80053 COMPREHEN METABOLIC PANEL: CPT

## 2021-07-20 PROCEDURE — 80061 LIPID PANEL: CPT

## 2021-07-20 PROCEDURE — 36415 COLL VENOUS BLD VENIPUNCTURE: CPT

## 2021-07-20 PROCEDURE — 85027 COMPLETE CBC AUTOMATED: CPT

## 2021-07-21 ENCOUNTER — TELEPHONE (OUTPATIENT)
Dept: MEDICAL GROUP | Facility: PHYSICIAN GROUP | Age: 71
End: 2021-07-21

## 2021-07-21 DIAGNOSIS — R73.03 PREDIABETES: ICD-10-CM

## 2021-07-21 LAB
ALBUMIN SERPL BCP-MCNC: 4.2 G/DL (ref 3.2–4.9)
ALBUMIN/GLOB SERPL: 1.7 G/DL
ALP SERPL-CCNC: 67 U/L (ref 30–99)
ALT SERPL-CCNC: 16 U/L (ref 2–50)
ANION GAP SERPL CALC-SCNC: 11 MMOL/L (ref 7–16)
AST SERPL-CCNC: 20 U/L (ref 12–45)
BILIRUB SERPL-MCNC: 0.5 MG/DL (ref 0.1–1.5)
BUN SERPL-MCNC: 14 MG/DL (ref 8–22)
CALCIUM SERPL-MCNC: 8.8 MG/DL (ref 8.5–10.5)
CHLORIDE SERPL-SCNC: 104 MMOL/L (ref 96–112)
CHOLEST SERPL-MCNC: 172 MG/DL (ref 100–199)
CO2 SERPL-SCNC: 25 MMOL/L (ref 20–33)
CREAT SERPL-MCNC: 0.94 MG/DL (ref 0.5–1.4)
FASTING STATUS PATIENT QL REPORTED: NORMAL
GLOBULIN SER CALC-MCNC: 2.5 G/DL (ref 1.9–3.5)
GLUCOSE SERPL-MCNC: 108 MG/DL (ref 65–99)
HDLC SERPL-MCNC: 43 MG/DL
LDLC SERPL CALC-MCNC: 108 MG/DL
POTASSIUM SERPL-SCNC: 4 MMOL/L (ref 3.6–5.5)
PROT SERPL-MCNC: 6.7 G/DL (ref 6–8.2)
SODIUM SERPL-SCNC: 140 MMOL/L (ref 135–145)
TRIGL SERPL-MCNC: 106 MG/DL (ref 0–149)

## 2021-07-21 RX ORDER — FUROSEMIDE 20 MG/1
TABLET ORAL
Qty: 90 TABLET | Refills: 0 | Status: SHIPPED | OUTPATIENT
Start: 2021-07-21 | End: 2021-07-22

## 2021-07-22 DIAGNOSIS — I11.9 BENIGN HYPERTENSIVE HEART DISEASE WITHOUT HEART FAILURE: ICD-10-CM

## 2021-07-22 DIAGNOSIS — I51.89 DIASTOLIC DYSFUNCTION: ICD-10-CM

## 2021-07-22 DIAGNOSIS — I48.0 PAROXYSMAL ATRIAL FIBRILLATION (HCC): ICD-10-CM

## 2021-07-22 RX ORDER — FUROSEMIDE 20 MG/1
TABLET ORAL
Qty: 90 TABLET | Refills: 0 | Status: SHIPPED | OUTPATIENT
Start: 2021-07-22 | End: 2022-01-15

## 2021-07-29 ENCOUNTER — OFFICE VISIT (OUTPATIENT)
Dept: CARDIOLOGY | Facility: MEDICAL CENTER | Age: 71
End: 2021-07-29
Payer: MEDICARE

## 2021-07-29 VITALS
OXYGEN SATURATION: 95 % | RESPIRATION RATE: 16 BRPM | DIASTOLIC BLOOD PRESSURE: 76 MMHG | HEIGHT: 71 IN | HEART RATE: 74 BPM | WEIGHT: 275.2 LBS | BODY MASS INDEX: 38.53 KG/M2 | SYSTOLIC BLOOD PRESSURE: 128 MMHG

## 2021-07-29 DIAGNOSIS — I34.0 NONRHEUMATIC MITRAL VALVE REGURGITATION: ICD-10-CM

## 2021-07-29 DIAGNOSIS — I51.89 DIASTOLIC DYSFUNCTION: ICD-10-CM

## 2021-07-29 DIAGNOSIS — I48.0 PAROXYSMAL ATRIAL FIBRILLATION (HCC): ICD-10-CM

## 2021-07-29 DIAGNOSIS — G47.33 OSA (OBSTRUCTIVE SLEEP APNEA): ICD-10-CM

## 2021-07-29 DIAGNOSIS — Z79.01 CHRONIC ANTICOAGULATION: ICD-10-CM

## 2021-07-29 PROCEDURE — 99214 OFFICE O/P EST MOD 30 MIN: CPT | Performed by: INTERNAL MEDICINE

## 2021-07-29 ASSESSMENT — FIBROSIS 4 INDEX: FIB4 SCORE: 1.56

## 2021-07-29 NOTE — PROGRESS NOTES
Chief Complaint   Patient presents with   • Atrial Fibrillation     Dx: Paroxysmal atrial fibrillation (HCC)   • Hyperlipidemia     Dx: Hypercholesterolemia       Subjective:   Warren Templeton is a 71 y.o. male who presents today for follow-up of atrial fibrillation status post atrial for ablation ablation with PVI by Dr. Reddy 2/12/2018 currently managed with oral anticoagulation in the form of Eliquis that he is tolerating well and antiarrhythmic therapy with dofetilide also tolerated well.  Has not had any recurrence of symptomatic atrial fibrillation since his ablation and was maintained on dofetilide prior to and subsequently with recent discontinuation.     No significant changes since last visit.  Feels well.    Past Medical History:   Diagnosis Date   • Arthritis     all over   • Benign hypertensive heart disease without heart failure     Disabling from his career as a , occupation related.    • Breath shortness    • Chest pain, unspecified     ~[facility]   • Chronic anticoagulation 11/11/2016   • Cold 01/14/2018   • Diastolic dysfunction    • Head ache    • Headache, chronic daily    • Hemorrhagic disorder (HCC)     Eliquis   • High cholesterol    • Hypertension    • MR (mitral regurgitation)    • Osteoarthritis    • Paroxysmal atrial fibrillation (HCC) 2/28/2017   • Pure hypercholesterolemia     [facility]   • RLS (restless legs syndrome)      Past Surgical History:   Procedure Laterality Date   • CATARACT EXTRACTION WITH IOL  2020   • CERVICAL DISK AND FUSION ANTERIOR      2 level   • GASTRIC BYPASS LAPAROSCOPIC      With Daron-en-Y   • KNEE ARTHROPLASTY TOTAL Bilateral    • OTHER ORTHOPEDIC SURGERY  2007 note;    Bilateral, with musculoskelatal limitations     Family History   Problem Relation Age of Onset   • Hypertension Mother    • Cancer Mother         uterine, leukemia, skin   • Cancer Father         Esophageal   • Cancer Brother          Brain   • Hypertension Brother      Social History     Socioeconomic History   • Marital status:      Spouse name: Not on file   • Number of children: Not on file   • Years of education: Not on file   • Highest education level: Not on file   Occupational History   • Not on file   Tobacco Use   • Smoking status: Never Smoker   • Smokeless tobacco: Never Used   Vaping Use   • Vaping Use: Never used   Substance and Sexual Activity   • Alcohol use: Yes     Alcohol/week: 1.2 oz     Types: 2 Cans of beer per week     Comment: 2-3X WEEK   • Drug use: No   • Sexual activity: Yes     Partners: Female     Comment: Ashlee    Other Topics Concern   • Not on file   Social History Narrative   • Not on file     Social Determinants of Health     Financial Resource Strain:    • Difficulty of Paying Living Expenses:    Food Insecurity:    • Worried About Running Out of Food in the Last Year:    • Ran Out of Food in the Last Year:    Transportation Needs:    • Lack of Transportation (Medical):    • Lack of Transportation (Non-Medical):    Physical Activity:    • Days of Exercise per Week:    • Minutes of Exercise per Session:    Stress:    • Feeling of Stress :    Social Connections:    • Frequency of Communication with Friends and Family:    • Frequency of Social Gatherings with Friends and Family:    • Attends [employer] Services:    • Active Member of Clubs or Organizations:    • Attends Club or Organization Meetings:    • Marital Status:    Intimate Partner Violence:    • Fear of Current or Ex-Partner:    • Emotionally Abused:    • Physically Abused:    • Sexually Abused:      Allergies   Allergen Reactions   • Magnesium Oxide Rash     Sores on scalp, rash on head&body, itchy anus     Outpatient Encounter Medications as of 7/29/2021   Medication Sig Dispense Refill   • furosemide (LASIX) 20 MG Tab TAKE 1 TABLET BY MOUTH EVERY DAY 90 tablet 0   • naproxen (NAPROSYN) 500 MG Tab TAKE 1 TABLET BY MOUTH AS NEEDED(OCCASIONAL USE,  NO MORE THAN 2 TIMES PER WEEK). CAUTION WHEN USING ELIQUIS 90 tablet 0   • ALPRAZolam (XANAX) 0.25 MG Tab Take 1 tablet by mouth at bedtime as needed for Sleep for up to 60 days. 15 tablet 0   • meclizine (ANTIVERT) 12.5 MG Tab TAKE 1 TABLET BY MOUTH THREE TIMES DAILY AS NEEDED 30 tablet 1   • amLODIPine (NORVASC) 10 MG Tab TAKE 1 TABLET BY MOUTH EVERY DAY 90 tablet 0   • metoprolol SR (TOPROL XL) 50 MG TABLET SR 24 HR Take 1 tablet by mouth 2 times a day. 180 tablet 0   • apixaban (ELIQUIS) 5mg Tab Take 1 tablet by mouth 2 times a day. 180 tablet 0   • Homeopathic Products (CLEAR TINNITUS PO) Take  by mouth.     • Prenatal Multivit-Min-Fe-FA (PRE- PO) Take 1 capsule by mouth.     • fluticasone (FLONASE) 50 MCG/ACT nasal spray Administer 1 spray into each nostril twice daily for 2 weeks, then daily thereafter. 16 g 3   • valsartan (DIOVAN) 160 MG Tab Take 1 tablet by mouth 2 times a day. Please call [phone] and schedule a follow up appointment for further refills. Thank you! 180 tablet 1   • potassium chloride SA (K-DUR) 10 MEQ Tab CR Take 2 Tabs by mouth every day. 180 Tab 3   • fluocinonide (LIDEX) 0.05 % external solution Apply 0.05 Applicators to affected area(s) every day.  3   • Multiple Vitamins-Minerals (OPTISOURCE POST BARIATRIC SURG PO) Take 1 Tab by mouth 2 Times a Day.     • cyanocobalamin (VITAMIN B-12) 500 MCG TABS Take 500 mcg by mouth every day.     • omeprazole (PRILOSEC) 20 MG CPDR Take 20 mg by mouth 2 times a day.     • tramadol (ULTRAM) 50 MG TABS Take  mg by mouth every four hours as needed for Severe Pain.     • ascorbic acid (ASCORBIC ACID) 500 MG TABS Take 500 mg by mouth 2 Times a Day.     • Calcium Citrate (CITRACAL PO) Take 2 Tabs by mouth 2 Times a Day.       No facility-administered encounter medications on file as of 2021.     Review of Systems   All other systems reviewed and are negative.       Objective:   /76 (BP Location: Left arm, Patient Position:  "Sitting, BP Cuff Size: Adult)   Pulse 74   Resp 16   Ht 1.803 m (5' 11\")   Wt 125 kg (275 lb 3.2 oz)   SpO2 95%   BMI 38.38 kg/m²     Physical Exam   Constitutional: He is oriented to person, place, and time. He appears well-developed. No distress.   Obese   HENT:   Head: Normocephalic and atraumatic.   Right Ear: External ear normal.   Left Ear: External ear normal.   Eyes: Pupils are equal, round, and reactive to light. Conjunctivae are normal. Right eye exhibits no discharge. Left eye exhibits no discharge. No scleral icterus.   Neck: No JVD present. No tracheal deviation present. No thyromegaly present.   Cardiovascular: Normal rate and regular rhythm.  Occasional extrasystoles are present. PMI is not displaced. Exam reveals no gallop and no friction rub.   No murmur heard.  Pulses:       Carotid pulses are 2+ on the right side and 2+ on the left side.       Radial pulses are 2+ on the left side.        Popliteal pulses are 2+ on the right side and 2+ on the left side.        Dorsalis pedis pulses are 2+ on the right side and 2+ on the left side.        Posterior tibial pulses are 2+ on the right side and 2+ on the left side.   Pulmonary/Chest: Effort normal and breath sounds normal. No respiratory distress. He has no wheezes. He has no rales. He exhibits no tenderness.   Abdominal: Soft. Bowel sounds are normal. He exhibits no distension. There is no abdominal tenderness.   Musculoskeletal:         General: No tenderness or deformity. Normal range of motion.      Cervical back: Normal range of motion and neck supple.   Neurological: He is alert and oriented to person, place, and time. No cranial nerve deficit (cranial nerves II through XII grossly intact). Coordination normal.   Skin: Skin is warm and dry. No rash noted. He is not diaphoretic. No erythema. No pallor.   Psychiatric: His behavior is normal. Thought content normal.   Vitals reviewed.  No significant change in since prior evaluation " 2019    LABS:  Lab Results   Component Value Date/Time    CHOLSTRLTOT 172 2021 12:22 PM     (H) 2021 12:22 PM    HDL 43 2021 12:22 PM    TRIGLYCERIDE 106 2021 12:22 PM       Lab Results   Component Value Date/Time    WBC 5.6 2021 12:22 PM    RBC 4.44 (L) 2021 12:22 PM    HEMOGLOBIN 13.2 (L) 2021 12:22 PM    HEMATOCRIT 40.4 (L) 2021 12:22 PM    MCV 91.0 2021 12:22 PM    NEUTSPOLYS 55.10 2018 12:05 PM    LYMPHOCYTES 31.40 2018 12:05 PM    MONOCYTES 10.20 2018 12:05 PM    EOSINOPHILS 2.40 2018 12:05 PM    BASOPHILS 0.50 2018 12:05 PM     Lab Results   Component Value Date/Time    SODIUM 140 2021 12:22 PM    POTASSIUM 4.0 2021 12:22 PM    CHLORIDE 104 2021 12:22 PM    CO2 25 2021 12:22 PM    GLUCOSE 108 (H) 2021 12:22 PM    BUN 14 2021 12:22 PM    CREATININE 0.94 2021 12:22 PM     Lab Results   Component Value Date    HBA1C 6.4 (H) 2021      Lab Results   Component Value Date/Time    ALKPHOSPHAT 67 2021 12:22 PM    ASTSGOT 20 2021 12:22 PM    ALTSGPT 16 2021 12:22 PM    TBILIRUBIN 0.5 2021 12:22 PM      No results found for: BNPBTYPENAT   No results found for: TSH  Lab Results   Component Value Date/Time    PROTHROMBTM 15.0 (H) 2018 12:05 PM    INR 1.21 (H) 2018 12:05 PM      EKG (10/31/2018 ):  I have personally reviewed the EKG this visit and discussed with the patient.  Results for orders placed or performed in visit on 18   [facility]MON[facility] EKG (Clinic Performed)   Result Value Ref Range    Report       [facility] [place] [facility]    Test Date:  2018  Pt Name:    GABRIELA MOSOCSO              Department: [facility]  MRN:        [id number]                      Room:  Gender:     Male                         Technician: SONI  :        1950                   Requested By:AMELIA YIP  Order #:    [id number]                    Reading  MD: Noa Santiago MD    Measurements  Intervals                                Axis  Rate:       70                           P:          4  KY:         168                          QRS:        36  QRSD:       104                          T:          18  QT:         416  QTc:        449    Interpretive Statements  SINUS RHYTHM  BORDERLINE LOW VOLTAGE IN FRONTAL LEADS  PROBABLE POSTERIOR INFARCT  Compared to ECG 03/06/2018 13:21:41  Myocardial infarct finding now present  T-wave abnormality no longer present    Electronically Signed On 5-9-2018 15:10:52 PDT by Noa Santiago MD         ECHO CONCLUSIONS (7/11/2016):  Compared to the images of the prior study done on 06/21/12, no   significant change    Normal left ventricular size and systolic function.  Mild concentric left ventricular hypertrophy.  Left ventricular ejection fraction is visually estimated to be 65%.  Severely dilated left atrium.  Mitral annular calcification.  Mild mitral regurgitation.  Mild tricuspid regurgitation.  Estimated right ventricular systolic pressure  is 30 mmHg.  Moderately dilated right ventricle.    Assessment:     1. JIGNA (obstructive sleep apnea)  REFERRAL TO PULMONARY AND SLEEP MEDICINE   2. Paroxysmal atrial fibrillation (HCC)     3. Chronic anticoagulation     4. Diastolic dysfunction     5. Nonrheumatic mitral valve regurgitation  EC-ECHOCARDIOGRAM COMPLETE W/O CONT       Medical Decision Making:  Today's Assessment / Status / Plan:     Doing well tolerating anticoagulation.  Continue indefinitely as tolerated.  Follow-up echocardiogram mild valvular disease remotely.  Expectant management for his rhythm.  Recommend referral to pulmonary/sleep medicine for markedly abnormal overnight oximetry.  We discussed the need for this evaluation.  Otherwise continue medical therapy and follow-up in 6 months.

## 2021-08-12 ENCOUNTER — OFFICE VISIT (OUTPATIENT)
Dept: NEUROLOGY | Facility: MEDICAL CENTER | Age: 71
End: 2021-08-12
Attending: PSYCHIATRY & NEUROLOGY
Payer: MEDICARE

## 2021-08-12 VITALS
OXYGEN SATURATION: 94 % | HEART RATE: 78 BPM | HEIGHT: 71 IN | WEIGHT: 278.88 LBS | BODY MASS INDEX: 39.04 KG/M2 | DIASTOLIC BLOOD PRESSURE: 80 MMHG | TEMPERATURE: 97.7 F | SYSTOLIC BLOOD PRESSURE: 126 MMHG

## 2021-08-12 DIAGNOSIS — R42 VERTIGO: ICD-10-CM

## 2021-08-12 PROCEDURE — 99213 OFFICE O/P EST LOW 20 MIN: CPT | Performed by: PSYCHIATRY & NEUROLOGY

## 2021-08-12 PROCEDURE — 99211 OFF/OP EST MAY X REQ PHY/QHP: CPT | Performed by: PSYCHIATRY & NEUROLOGY

## 2021-08-12 ASSESSMENT — FIBROSIS 4 INDEX: FIB4 SCORE: 1.56

## 2021-08-12 NOTE — PROGRESS NOTES
"[facility] NEUROLOGY  GENERAL NEUROLOGY  FOLLOW-UP VISIT    CC: possible BPPV    INTERVAL HISTORY:  Warren Templeton is a 71 y.o. man with possible BPPV and a history otherwise notable for chronic tension-type headache, HTN, HLD, AF, and chronic anticoagulation.  I last saw him in the clinic on 2021.  At that time I recommended modified Epley maneuvers.  Today, he was accompanied by his wife, and he provided the following interval history:    Warren carried out the modified Epley maneuvers.  These seem to have offered him some benefit since he has not had any additional episodes of vertigo for the past month.  He has required the meclizine at times for mild symptoms.    Butch continues to feel some unusual symptoms when he first lies down at night.  For about 1 minute his brain feels like its \"floating\" as if he were on a boat.  There is no spinning sensation associated with this.    His hearing remains unchanged.  There is a mild tinnitus which is improved with the addition of a multivitamin.    MEDICATIONS:  Current Outpatient Medications   Medication Sig   • furosemide (LASIX) 20 MG Tab TAKE 1 TABLET BY MOUTH EVERY DAY   • naproxen (NAPROSYN) 500 MG Tab TAKE 1 TABLET BY MOUTH AS NEEDED(OCCASIONAL USE, NO MORE THAN 2 TIMES PER WEEK). CAUTION WHEN USING ELIQUIS   • ALPRAZolam (XANAX) 0.25 MG Tab Take 1 tablet by mouth at bedtime as needed for Sleep for up to 60 days.   • meclizine (ANTIVERT) 12.5 MG Tab TAKE 1 TABLET BY MOUTH THREE TIMES DAILY AS NEEDED   • amLODIPine (NORVASC) 10 MG Tab TAKE 1 TABLET BY MOUTH EVERY DAY   • metoprolol SR (TOPROL XL) 50 MG TABLET SR 24 HR Take 1 tablet by mouth 2 times a day.   • apixaban (ELIQUIS) 5mg Tab Take 1 tablet by mouth 2 times a day.   • Homeopathic Products (CLEAR TINNITUS PO) Take  by mouth.   • Prenatal Multivit-Min-Fe-FA (PRE- PO) Take 1 capsule by mouth.   • fluticasone (FLONASE) 50 MCG/ACT nasal spray Administer 1 spray into each nostril twice daily for 2 " weeks, then daily thereafter.   • valsartan (DIOVAN) 160 MG Tab Take 1 tablet by mouth 2 times a day. Please call [phone] and schedule a follow up appointment for further refills. Thank you!   • potassium chloride SA (K-DUR) 10 MEQ Tab CR Take 2 Tabs by mouth every day.   • fluocinonide (LIDEX) 0.05 % external solution Apply 0.05 Applicators to affected area(s) every day.   • Multiple Vitamins-Minerals (OPTISOURCE POST BARIATRIC SURG PO) Take 1 Tab by mouth 2 Times a Day.   • cyanocobalamin (VITAMIN B-12) 500 MCG TABS Take 500 mcg by mouth every day.   • omeprazole (PRILOSEC) 20 MG CPDR Take 20 mg by mouth 2 times a day.   • tramadol (ULTRAM) 50 MG TABS Take  mg by mouth every four hours as needed for Severe Pain.   • ascorbic acid (ASCORBIC ACID) 500 MG TABS Take 500 mg by mouth 2 Times a Day.   • Calcium Citrate (CITRACAL PO) Take 2 Tabs by mouth 2 Times a Day.     MEDICAL, SOCIAL, AND FAMILY HISTORY:  There is no change in the patient's ROS or medical, social, or family histories since the previous visit on 6/8/2021.    REVIEW OF SYSTEMS:  A ROS was completed.  Pertinent positives and negatives were included in the HPI, above.  All other systems were reviewed and are negative.    PHYSICAL EXAM:  General/Medical:  - NAD  - hair, skin, nails, and joints were normal    Neuro:  MENTAL STATUS: awake and alert; no deficits of speech or language; oriented to person, place, and time; affect was appropriate to situation; pleasant, cooperative    CRANIAL NERVES:    II: acuity: NT, fields: intact to confrontation, pupils: 3/3 to 2/2 without a relative afferent pupillary defect, discs: sharp    III/IV/VI: versions: intact without nystagmus    V: facial sensation: symmetric to light touch    VII: facial expression: symmetric    VIII: hearing: intact to finger rub    IX/X: palate: elevates symmetrically    XI: shoulder shrug: symmetric    XII: tongue: midline    MOTOR:  - bulk: normal throughout  - tone: normal  "throughout  Upper Extremity Strength  (R/L)    5/5   Elbow flexion 5/5   Elbow extension 5/5   Shoulder abduction 5/5     Lower Extremity Strength  (R/L)   Hip flexion 5/5   Knee extension 5/5   Knee flexion 5/5   Ankle plantarflexion 5/5   Ankle dorsiflexion 5/5     - can walk on toes and heels  - pronator drift: absent  - abnormal movements: none    SENSATION:  - light touch: grossly intact over the upper and lower extremities  - vibration (R/L, seconds): NT at the great toes  - pinprick: NT  - proprioception: NT  - Romberg: absent    COORDINATION:  - finger to nose: NT  - finger tapping: NT    REFLEXES:  Reflex Right Left   BR 2+ 2+   Biceps 2+ 2+   Triceps 2+ 2+   Patellae 2+ 2+   Achilles NT NT   Toes NT NT     GAIT:  - narrow base and normal    REVIEW OF IMAGING STUDIES:  No additional head/brain imaging since the last visit.    REVIEW OF LABORATORY STUDIES:  7/20/2021:  - CBC w/ diff: notable for mild anemia  - CMP: WNL    ASSESSMENT:  Warren Templeton is a 71 y.o. man with likely BPPV and a history otherwise notable for chronic tension-type headache, HTN, HLD, AF, and chronic anticoagulation.  Cynthia symptoms have improved with the incorporation of modified Epley maneuvers.  He will resume these if his symptoms return.  Otherwise, we will follow-up on an as-needed basis.    PLAN:  BPPV:  - resume modified Epley maneuvers as needed    Follow-Up:  - Return if symptoms worsen or fail to improve.    Signed: Sergio Martins M.D.    BILLING DOCUMENTATION:   I spent 25 minutes reviewing the medical record, interviewing and examining the patient, discussing my impression (see \"assessment\" above), and coordinating care.  "

## 2021-09-11 DIAGNOSIS — I11.9 BENIGN HYPERTENSIVE HEART DISEASE WITHOUT HEART FAILURE: ICD-10-CM

## 2021-09-11 DIAGNOSIS — I51.89 DIASTOLIC DYSFUNCTION: ICD-10-CM

## 2021-09-11 DIAGNOSIS — I48.0 PAROXYSMAL ATRIAL FIBRILLATION (HCC): ICD-10-CM

## 2021-09-13 RX ORDER — POTASSIUM CHLORIDE 750 MG/1
TABLET, EXTENDED RELEASE ORAL
Qty: 180 TABLET | Refills: 3 | Status: SHIPPED | OUTPATIENT
Start: 2021-09-13 | End: 2022-06-29

## 2021-09-19 DIAGNOSIS — I51.89 DIASTOLIC DYSFUNCTION: ICD-10-CM

## 2021-09-19 DIAGNOSIS — I48.0 PAROXYSMAL ATRIAL FIBRILLATION (HCC): ICD-10-CM

## 2021-09-19 DIAGNOSIS — I11.9 BENIGN HYPERTENSIVE HEART DISEASE WITHOUT HEART FAILURE: ICD-10-CM

## 2021-09-20 RX ORDER — METOPROLOL SUCCINATE 50 MG/1
TABLET, EXTENDED RELEASE ORAL
Qty: 180 TABLET | Refills: 3 | Status: SHIPPED | OUTPATIENT
Start: 2021-09-20 | End: 2022-09-25

## 2021-09-20 RX ORDER — APIXABAN 5 MG/1
TABLET, FILM COATED ORAL
Qty: 180 TABLET | Refills: 3 | Status: SHIPPED | OUTPATIENT
Start: 2021-09-20 | End: 2022-09-23

## 2021-09-20 RX ORDER — AMLODIPINE BESYLATE 10 MG/1
TABLET ORAL
Qty: 90 TABLET | Refills: 3 | Status: SHIPPED | OUTPATIENT
Start: 2021-09-20 | End: 2022-09-25

## 2021-09-29 ENCOUNTER — OFFICE VISIT (OUTPATIENT)
Dept: SLEEP MEDICINE | Facility: MEDICAL CENTER | Age: 71
End: 2021-09-29
Payer: MEDICARE

## 2021-09-29 VITALS
WEIGHT: 275 LBS | HEART RATE: 89 BPM | BODY MASS INDEX: 38.5 KG/M2 | OXYGEN SATURATION: 94 % | DIASTOLIC BLOOD PRESSURE: 86 MMHG | HEIGHT: 71 IN | RESPIRATION RATE: 16 BRPM | SYSTOLIC BLOOD PRESSURE: 130 MMHG

## 2021-09-29 DIAGNOSIS — E78.5 DYSLIPIDEMIA: ICD-10-CM

## 2021-09-29 DIAGNOSIS — I38 VALVULAR HEART DISEASE: ICD-10-CM

## 2021-09-29 DIAGNOSIS — Z86.79 S/P ABLATION OF ATRIAL FIBRILLATION: ICD-10-CM

## 2021-09-29 DIAGNOSIS — G47.30 BREATHING-RELATED SLEEP DISORDER: ICD-10-CM

## 2021-09-29 DIAGNOSIS — Z98.890 S/P ABLATION OF ATRIAL FIBRILLATION: ICD-10-CM

## 2021-09-29 PROCEDURE — 99203 OFFICE O/P NEW LOW 30 MIN: CPT | Performed by: PREVENTIVE MEDICINE

## 2021-09-29 ASSESSMENT — FIBROSIS 4 INDEX: FIB4 SCORE: 1.56

## 2021-09-29 NOTE — PROGRESS NOTES
"  CC: \"I guess I need to be evaluated for sleep apnea.\"    HPI:  Warren Templeton is a 71 y.o. male who has a past medical history of atrial fibrillation status post ablation with PVI by Dr. Reddy on February 12, 2018.  He is currently managed with oral anticoagulation in the form of Eliquis which he is tolerating well and he recently stopped  antiarrhythmic therapy with dofetilide .  He has not had any recurrence of his symptomatic atrial fibrillation since his ablation.    This patient prefers to go to bed around midnight and falls asleep watching a movie.  I retired down so I can sleep and I want to sleep.  Also I hate the dark and I hate silence so I always have the TV on. I tried white noise that does not work.    Symptom Summary:  Snoring: +  Very loud snoring: +  Witnessed apneas: ?  Resuscitative snorts: +  Nocturnal shortness of breath: -  Non-restorative sleep: -  EPWORTH SCORE:    18  /24, this is consistent with excessive daytime sleepiness  Insomnia: -  Nocturnal awakenings: +  Nocturia: +  EDS: +  Fatigue: +  Tiredness: +  Falls asleep accidentally: +   Napping or returning to bed after arising: +  Restless legs: -  Limb movements during sleep: -  Nocturnal headaches: -  Morning Headaches: \" I constantly have a HA.\"    Significant comorbidities and modifying factors include: HPI      Patient Active Problem List    Diagnosis Date Noted   • Fear of flying 06/29/2021   • Vertigo 03/30/2021   • History of viral illness 01/07/2021   • Nocturnal hypoxia 08/19/2020   • History of gastric bypass 03/09/2020   • History of cardiac radiofrequency ablation 10/11/2019   • Chronic tension-type headache, not intractable 10/11/2019   • Bilateral hearing loss 10/11/2019   • Toenail fungus 10/11/2019   • Gastroesophageal reflux disease without esophagitis 10/11/2019   • Seborrheic dermatitis of scalp 10/11/2019   • Leg swelling 10/11/2019   • Actinic keratoses 10/11/2019   • Atrial fibrillation (HCC) 01/17/2018 "   • High risk medication use 11/11/2016   • Chronic anticoagulation 11/11/2016   • Sleep apnea 08/26/2016   • Diastolic dysfunction 05/18/2012   • MR (mitral regurgitation) 05/18/2012   • Osteoarthritis 05/18/2012   • Benign hypertensive heart disease without heart failure 08/20/2007   • Hypercholesterolemia 08/20/2007   • Other chest pain 08/20/2007       Past Medical History:   Diagnosis Date   • Arthritis     all over   • Benign hypertensive heart disease without heart failure     Disabling from his career as a , occupation related.    • Breath shortness    • Chest pain, unspecified     ~2000 with negative cardiac cath by [facility]   • Chronic anticoagulation 11/11/2016   • Cold 01/14/2018   • Diastolic dysfunction    • Head ache    • Headache, chronic daily    • Hemorrhagic disorder (HCC)     Eliquis   • High cholesterol    • Hypertension    • MR (mitral regurgitation)    • Osteoarthritis    • Paroxysmal atrial fibrillation (HCC) 2/28/2017   • Pure hypercholesterolemia     [facility]   • RLS (restless legs syndrome)         Past Surgical History:   Procedure Laterality Date   • CATARACT EXTRACTION WITH IOL  2020   • CERVICAL DISK AND FUSION ANTERIOR      2 level   • GASTRIC BYPASS LAPAROSCOPIC      With Daron-en-Y   • KNEE ARTHROPLASTY TOTAL Bilateral    • OTHER ORTHOPEDIC SURGERY  2007 note;    Bilateral, with musculoskelatal limitations       Family History   Problem Relation Age of Onset   • Hypertension Mother    • Cancer Mother         uterine, leukemia, skin   • Cancer Father         Esophageal   • Cancer Brother         Brain   • Hypertension Brother        Social History     Socioeconomic History   • Marital status:      Spouse name: Not on file   • Number of children: Not on file   • Years of education: Not on file   • Highest education level: Not on file   Occupational History   • Not on file   Tobacco Use   • Smoking status: Never Smoker   • Smokeless tobacco: Never Used    Vaping Use   • Vaping Use: Never used   Substance and Sexual Activity   • Alcohol use: Yes     Alcohol/week: 1.2 oz     Types: 2 Cans of beer per week     Comment: 2-3X WEEK   • Drug use: No   • Sexual activity: Yes     Partners: Female     Comment: Ashlee    Other Topics Concern   • Not on file   Social History Narrative   • Not on file     Social Determinants of Health     Financial Resource Strain:    • Difficulty of Paying Living Expenses:    Food Insecurity:    • Worried About Running Out of Food in the Last Year:    • Ran Out of Food in the Last Year:    Transportation Needs:    • Lack of Transportation (Medical):    • Lack of Transportation (Non-Medical):    Physical Activity:    • Days of Exercise per Week:    • Minutes of Exercise per Session:    Stress:    • Feeling of Stress :    Social Connections:    • Frequency of Communication with Friends and Family:    • Frequency of Social Gatherings with Friends and Family:    • Attends Taoism Services:    • Active Member of Clubs or Organizations:    • Attends Club or Organization Meetings:    • Marital Status:    Intimate Partner Violence:    • Fear of Current or Ex-Partner:    • Emotionally Abused:    • Physically Abused:    • Sexually Abused:            ALLERGIES: Magnesium oxide    ROS    Constitutional: Denies weight loss, fatigue.  Eyes: Denies vision loss,    glasses.  Ears/Nose/Mouth/Throat: Denies rhinitis/nasal congestion, injury, decayed teeth/toothaches.  Cardiovascular: Denies chest pain, tightness, palpitations, swelling in legs/feet, difficulty breathing when lying down but gets better when sitting up.   Respiratory: Denies shortness of breath while awake,  Sleep: per HPI  Gastrointestinal: Denies  difficulty swallowing,  heartburn.  Genitourinary: REPORTS nocturia  Musculoskeletal: Denies painful joints, sore muscles, back pain.   Neurological: Denies frequent headaches,weakness, dizziness.    PHYSICAL EXAM    Neck circumference: 17.5 in  BP  "130/86 (BP Location: Left arm, Patient Position: Sitting, BP Cuff Size: Large adult)   Pulse 89   Resp 16   Ht 1.803 m (5' 11\")   Wt 125 kg (275 lb)   SpO2 94%   BMI 38.35 kg/m²   Appearance: Well-nourished, well-developed,  looks stated age, no acute distress  Eyes:   EOMI  ENMT: MASKED  Neck: Supple, trachea midline  Respiratory effort:  No intercostal retractions or use of accessory muscles  Lung auscultation:  No wheezes rhonchi rubs or rales  Cardiac: No murmurs, rubs, or gallops; regular rhythm, normal rate; no edema  Musculoskeletal:  Grossly normal; gait and station normal  Neurologic:  oriented to person, time, place, and purpose; judgement intact  Psychiatric:  No depression, anxiety, agitation        Medical Decision Making:  The medical record was reviewed in its as pertains to this referral. This includes records from primary care, consultants notes,  referral request, hospital records, labs, imaging.    Any available diagnostic and titration nocturnal polysomnograms, home sleep apnea tests, continuous nocturnal oximetry results, multiple sleep latency tests, and compliance reports were reviewed with the patient.    Assessment:    1. S/P ablation of atrial fibrillation    - Overnight Home Sleep Study; Future    2. Breathing-related sleep disorder    - Overnight Home Sleep Study; Future    3. Dyslipidemia    - Overnight Home Sleep Study; Future    4. Valvular heart disease    - Overnight Home Sleep Study; Future        PLAN:     The patient has signs and symptoms consistent with obstructive sleep apnea hypopnea syndrome. Will schedule  a home sleep apnea test.      The risks of untreated sleep apnea were discussed with the patient at length. Patients with JIGNA are at increased risk of cardiovascular disease including coronary artery disease, systemic arterial hypertension, pulmonary arterial hypertension, cardiac arrhythmias, and stroke. JIGNA patients have an increased risk of motor vehicle " accidents, type 2 diabetes, chronic kidney disease, and non-alcoholic liver disease. The patient was advised to avoid driving a motor vehicle when drowsy.        Have advised the patient to follow up with the appropriate healthcare practitioners for all other medical problems and issues.              RTC: 2 weeks after home sleep test is complete

## 2021-10-08 DIAGNOSIS — I48.0 PAROXYSMAL ATRIAL FIBRILLATION (HCC): ICD-10-CM

## 2021-10-08 RX ORDER — NAPROXEN 500 MG/1
TABLET ORAL
Qty: 90 TABLET | Refills: 0 | Status: SHIPPED | OUTPATIENT
Start: 2021-10-08 | End: 2022-11-16

## 2021-10-20 ENCOUNTER — HOSPITAL ENCOUNTER (OUTPATIENT)
Dept: CARDIOLOGY | Facility: MEDICAL CENTER | Age: 71
End: 2021-10-20
Attending: INTERNAL MEDICINE
Payer: MEDICARE

## 2021-10-20 DIAGNOSIS — I34.0 NONRHEUMATIC MITRAL VALVE REGURGITATION: ICD-10-CM

## 2021-10-20 PROCEDURE — 93306 TTE W/DOPPLER COMPLETE: CPT

## 2021-10-21 LAB
LV EJECT FRACT  99904: 65
LV EJECT FRACT MOD 2C 99903: 65.5
LV EJECT FRACT MOD 4C 99902: 70.41
LV EJECT FRACT MOD BP 99901: 68.47

## 2021-10-21 PROCEDURE — 93306 TTE W/DOPPLER COMPLETE: CPT | Mod: 26 | Performed by: INTERNAL MEDICINE

## 2021-11-04 ENCOUNTER — APPOINTMENT (OUTPATIENT)
Dept: SLEEP MEDICINE | Facility: MEDICAL CENTER | Age: 71
End: 2021-11-04
Attending: PREVENTIVE MEDICINE
Payer: MEDICARE

## 2022-01-05 ENCOUNTER — TELEPHONE (OUTPATIENT)
Dept: CARDIOLOGY | Facility: MEDICAL CENTER | Age: 72
End: 2022-01-05

## 2022-01-05 DIAGNOSIS — I48.0 PAROXYSMAL ATRIAL FIBRILLATION (HCC): ICD-10-CM

## 2022-01-05 NOTE — TELEPHONE ENCOUNTER
TW    Patient is feeling fatigue and requesting a call back to see if you would like for him to come in. Concerned he may be back in A-fib.    Please reach out to him PH:  [phone]    Thank You,  Ana hamilton

## 2022-01-06 NOTE — TELEPHONE ENCOUNTER
"Confirmed per chart, pt's number [phone]. Called pt to discuss. He said, \"I feel good right now, towards the day I just feel exhausted-like. My pulse sometimes is doing weird stuff. In the past, when I had Afib, I'd feel exhausted-like.\" Pt unfortunately does not have recent BP or HR readings. He is interested in Elivar sandra and advised will send him information via [employer].    Pt sees a new PCP in [place] under Dr. Kerwin England named Nicole Villegas (sp?). He had a FV on Tuesday. An EKG was not completed which is why he is inquiring about Kardia and calling our office. He reported no new recent medication changes and is inquiring about any new recommendations. Advised will reach out if any new changes and give him a call back. Pt verbalized understanding and was appreciative.    To Dr. Ya - please advise if any new medication recommendations in the mean time. Can schedule EKG check/FV. Thank you!  "

## 2022-01-07 ENCOUNTER — HOME STUDY (OUTPATIENT)
Dept: SLEEP MEDICINE | Facility: MEDICAL CENTER | Age: 72
End: 2022-01-07
Payer: MEDICARE

## 2022-01-07 ENCOUNTER — NON-PROVIDER VISIT (OUTPATIENT)
Dept: CARDIOLOGY | Facility: MEDICAL CENTER | Age: 72
End: 2022-01-07
Payer: MEDICARE

## 2022-01-07 DIAGNOSIS — E78.5 DYSLIPIDEMIA: ICD-10-CM

## 2022-01-07 DIAGNOSIS — Z86.79 S/P ABLATION OF ATRIAL FIBRILLATION: ICD-10-CM

## 2022-01-07 DIAGNOSIS — Z98.890 S/P ABLATION OF ATRIAL FIBRILLATION: ICD-10-CM

## 2022-01-07 DIAGNOSIS — I48.0 PAROXYSMAL ATRIAL FIBRILLATION (HCC): ICD-10-CM

## 2022-01-07 DIAGNOSIS — G47.30 BREATHING-RELATED SLEEP DISORDER: ICD-10-CM

## 2022-01-07 DIAGNOSIS — I38 VALVULAR HEART DISEASE: ICD-10-CM

## 2022-01-07 PROCEDURE — G0399 HOME SLEEP TEST/TYPE 3 PORTA: HCPCS | Performed by: PREVENTIVE MEDICINE

## 2022-01-07 NOTE — TELEPHONE ENCOUNTER
Please see chart notes from EKG check. Per TW review, not Afib. If pt remains concerned, can get 48-hr Holter monitor. Called pt to discuss. He would like to go ahead and schedule Holter. Order placed and transferred call to schedulers. Advised for pt to call if any questions/concerns in the mean time. He verbalized understanding and was appreciative.

## 2022-01-07 NOTE — TELEPHONE ENCOUNTER
Ayush Ya M.D.  You 17 hours ago (4:04 PM)     Please have him get a twelve-lead ECG.      Scheduled pt for EKG at 1:15pm, confirmed time and location. Pt verbalized understanding and was appreciative.

## 2022-01-07 NOTE — NON-PROVIDER
EKG showing SR 79, given to TW for review who states need to further review. Will provide recommendations after. In to see pt, pt confirmed per last telephone encounter notes. He takes PRN meclizine for vertigo symptoms which he feels does help when having symptoms of dizziness. No other recent changes to medications. He bought the [employer] mobile device and is waiting for it to be delivered. Provided pt with printout information and instructions on how to use and send readings via [employer]. Questions answered and FV scheduled for 03/07/22. Advised will give him a call back once recommendations received per TW. He verbalized understanding, accompanied by spouse Ashlee, they were appreciative. Checked out in stable condition.

## 2022-01-07 NOTE — Clinical Note
Patient was here today for EKG per TW. EKG performed and transferred into patients chart. Paper copy of EKG given to Saloni JENSEN for TW.   -RN has instructed MA that they will be going into the room with patient. RN to walk patient out to check out.

## 2022-01-11 LAB — EKG IMPRESSION: NORMAL

## 2022-01-11 PROCEDURE — 93000 ELECTROCARDIOGRAM COMPLETE: CPT | Performed by: INTERNAL MEDICINE

## 2022-01-11 NOTE — PROCEDURES
Interpretation:    This home sleep test was performed on January 8, 2022 using a [employer] HST( ApneaLink Air)   recording device.       The total recording time was 10 hours and 54 minutes,  and the monitoring time was 10 hours and 38 minutes. Oxygen saturation evaluation time was 10 hours and 41 minutes.    The device recorded  central apneas index of 2.8, and obstructive apneas index of 9.1, and a  mixed apneas index of 0.0.  The CHARLEY (respiratory event index which is a surrogate for the AHI) was 35.2.  The AI (apnea index) was 11.9 the HI (hypopnia index) was 23.3.  The supine CHARLEY was 37.5.  Most of the respiratory events occurred in the supine position.    The patient experienced 409 desaturations and the oxygen desaturation index was 38.3.  During sleep the average saturation was 91%, the maria guadalupe saturation was 81%.  The patient spent 36% of evaluation time with saturations less than 90%.      The ave heart rate during sleep was 70 bpm, the maximum heart rate during sleep was 92 bpm, and the minimum heart rate during sleep was 43 bpm.      NOTE:    A HST cannot provides an AHI ; instead this type of study generates an CHARLEY.  It is not possible with this device to determine a traditional apnea+hypopnea index (AHI) for total sleep time since EEG channels are not available.  Actual sleep estimates require brainwave activity monitoring.         Assessment:    Severe sleep apnea - CHARLEY 35.2  Moderate to severe nocturnal desaturation - maria guadalupe saturation 81% - less than 90% for 36% of the evaluation time.          Recommendation:    Given the severity of the obstructive sleep apnea as well as the severity of the nocturnal oxygen desaturation this patient would be well served to undergo a full night titration study in lab.

## 2022-01-21 ENCOUNTER — OFFICE VISIT (OUTPATIENT)
Dept: SLEEP MEDICINE | Facility: MEDICAL CENTER | Age: 72
End: 2022-01-21
Payer: MEDICARE

## 2022-01-21 VITALS
DIASTOLIC BLOOD PRESSURE: 60 MMHG | RESPIRATION RATE: 16 BRPM | SYSTOLIC BLOOD PRESSURE: 130 MMHG | HEIGHT: 72 IN | BODY MASS INDEX: 37.02 KG/M2 | HEART RATE: 83 BPM | OXYGEN SATURATION: 95 % | WEIGHT: 273.3 LBS

## 2022-01-21 DIAGNOSIS — G47.33 OSA (OBSTRUCTIVE SLEEP APNEA): ICD-10-CM

## 2022-01-21 PROCEDURE — 99213 OFFICE O/P EST LOW 20 MIN: CPT | Performed by: NURSE PRACTITIONER

## 2022-01-21 RX ORDER — ZOLPIDEM TARTRATE 5 MG/1
5 TABLET ORAL NIGHTLY PRN
Qty: 3 TABLET | Refills: 0 | Status: SHIPPED | OUTPATIENT
Start: 2022-01-21 | End: 2022-02-18 | Stop reason: SDUPTHER

## 2022-01-21 ASSESSMENT — FIBROSIS 4 INDEX: FIB4 SCORE: 1.56

## 2022-01-21 ASSESSMENT — PATIENT HEALTH QUESTIONNAIRE - PHQ9: CLINICAL INTERPRETATION OF PHQ2 SCORE: 0

## 2022-01-21 NOTE — PROGRESS NOTES
Chief Complaint   Patient presents with   • Follow-Up     SS results       HPI:  Warren Templeton is a 71 y.o. year old male here today for follow-up on JIGNA and sleep study results.  Last seen 9/29/2021 by Dr. Stiles. P previous complaints of snoring ast medical history of atrial fibrillation status post ablation with PVI  , Resuscitative snorts, previous Lisbon score of 18 out of 24, and endorses several nocturnal awakenings for nocturia with excessive daytime sleepiness and fatigue.  For these reasons patient underwent home sleep study.    Home sleep study (1/7/2022):  Severe sleep apnea - CHARLEY 35.2  Moderate to severe nocturnal desaturation - maria guadalupe saturation 81% - less than 90% for 36% of the evaluation time.  Recommendation:  Given the severity of the obstructive sleep apnea as well as the severity of the nocturnal oxygen desaturation this patient would be well served to undergo a full night titration study in lab.    ROS: As per HPI and otherwise negative if not stated.    Past Medical History:   Diagnosis Date   • Arthritis     all over   • Benign hypertensive heart disease without heart failure     Disabling from his career as a , occupation related.    • Breath shortness    • Chest pain, unspecified     ~2000 with negative cardiac cath by [facility]   • Chronic anticoagulation 11/11/2016   • Cold 01/14/2018   • Diastolic dysfunction    • Head ache    • Headache, chronic daily    • Hemorrhagic disorder (HCC)     Eliquis   • High cholesterol    • Hypertension    • MR (mitral regurgitation)    • Osteoarthritis    • Paroxysmal atrial fibrillation (HCC) 2/28/2017   • Pure hypercholesterolemia     [facility]   • RLS (restless legs syndrome)        Past Surgical History:   Procedure Laterality Date   • CATARACT EXTRACTION WITH IOL  2020   • CERVICAL DISK AND FUSION ANTERIOR      2 level   • GASTRIC BYPASS LAPAROSCOPIC      With Daron-en-Y   • KNEE ARTHROPLASTY TOTAL Bilateral    • OTHER  ORTHOPEDIC SURGERY   note;    Bilateral, with musculoskelatal limitations       Family History   Problem Relation Age of Onset   • Hypertension Mother    • Cancer Mother         uterine, leukemia, skin   • Cancer Father         Esophageal   • Cancer Brother         Brain   • Hypertension Brother        Allergies as of 2022 - Reviewed 2021   Allergen Reaction Noted   • Magnesium oxide Rash 2018        Vitals:  There were no vitals taken for this visit.    Current medications as of today   Current Outpatient Medications   Medication Sig Dispense Refill   • furosemide (LASIX) 20 MG Tab TAKE 1 TABLET BY MOUTH EVERY DAY 90 Tablet 1   • valsartan (DIOVAN) 160 MG Tab Take 1 Tablet by mouth 2 times a day. 180 Tablet 2   • naproxen (NAPROSYN) 500 MG Tab TAKE 1 TABLET BY MOUTH AS NEEDED(NO MORE THATN 2 TIMES PER WEEK) CAUTION WHEN USING WITH ELIQUIS 90 Tablet 0   • ELIQUIS 5 MG Tab TAKE 1 TABLET BY MOUTH TWICE DAILY 180 Tablet 3   • metoprolol SR (TOPROL XL) 50 MG TABLET SR 24 HR TAKE 1 TABLET BY MOUTH TWICE DAILY 180 Tablet 3   • amLODIPine (NORVASC) 10 MG Tab TAKE 1 TABLET BY MOUTH EVERY DAY 90 Tablet 3   • potassium chloride SA (K-DUR) 10 MEQ Tab CR TAKE 2 TABLETS BY MOUTH EVERY  Tablet 3   • meclizine (ANTIVERT) 12.5 MG Tab TAKE 1 TABLET BY MOUTH THREE TIMES DAILY AS NEEDED 30 tablet 1   • Homeopathic Products (CLEAR TINNITUS PO) Take  by mouth.     • Prenatal Multivit-Min-Fe-FA (PRE-SAMIRA PO) Take 1 capsule by mouth.     • fluticasone (FLONASE) 50 MCG/ACT nasal spray Administer 1 spray into each nostril twice daily for 2 weeks, then daily thereafter. 16 g 3   • fluocinonide (LIDEX) 0.05 % external solution Apply 0.05 Applicators to affected area(s) every day.  3   • Multiple Vitamins-Minerals (OPTISOURCE POST BARIATRIC SURG PO) Take 1 Tab by mouth 2 Times a Day.     • cyanocobalamin (VITAMIN B-12) 500 MCG TABS Take 500 mcg by mouth every day.     • omeprazole (PRILOSEC) 20 MG CPDR Take 20 mg  by mouth 2 times a day.     • tramadol (ULTRAM) 50 MG TABS Take  mg by mouth every four hours as needed for Severe Pain.     • ascorbic acid (ASCORBIC ACID) 500 MG TABS Take 500 mg by mouth 2 Times a Day.     • Calcium Citrate (CITRACAL PO) Take 2 Tabs by mouth 2 Times a Day.       No current facility-administered medications for this visit.         Physical Exam:   Gen:           Alert and oriented, No apparent distress. Mood and affect appropriate, normal interaction with examiner.  Eyes:          PERRL, EOM intact, sclere white, conjunctive moist.  Ears:          Not examined.   Hearing:     Grossly intact.  Nose:          Normal, no lesions or deformities.  Dentition:    Good dentition.  Oropharynx:   Tongue normal, posterior pharynx without erythema or exudate.  Neck:        Supple, trachea midline, no masses.  Respiratory Effort: No intercostal retractions or use of accessory muscles.   Lung Auscultation:      Clear to auscultation bilaterally; no rales, rhonchi or wheezing.  CV:            Regular rate and rhythm. No murmurs, rubs or gallops.  Abd:           Not examined.   Lymphadenopathy: Not examined.  Gait and Station: Normal.  Digits and Nails: No clubbing, cyanosis, petechiae, or nodes.   Cranial Nerves: II-XII grossly intact.  Skin:        No rashes, lesions or ulcers noted.               Ext:           No cyanosis or edema.      Assessment:  1. JIGNA (obstructive sleep apnea)         Plan:   I reviewed with the patient the pathophysiology of obstructive sleep apnea, as well as potential cardiac and neurologic risks associated with untreated sleep apnea including CAD, HTN, pulmonary arterial hypertension, cardiac arrhythmias, heart attack or stroke.  JIGNA patient's have increased risk of motor vehicle accidents, DM type II, chronic kidney disease and nonalcoholic liver disease.  He is cautioned against driving while sleepy for his safety and safety of others on the road. We reviewed treatment  modalities for sleep apnea including CPAP/BiPAP therapy, ENT referral, dental appliance.      Due to the severity of nocturnal hypoxia and JIGNA, we will order an in lab titration study on CPAP, BiPAP, and if central events are greater than 50%, consider ASV.  Follow-up will be approximately 8 weeks or sooner if needed.  Patient is amendable to starting CPAP therapy, and depending on results of titration, we will order required machine.    Please call our office if you have any questions.    Thank you, [facility] [place].  [phone]      Please note that this dictation was created using voice recognition software. I have made every reasonable attempt to correct obvious errors, but it is possible there are errors of grammar and possibly content that I did not discover before finalizing the note.

## 2022-01-24 ENCOUNTER — NON-PROVIDER VISIT (OUTPATIENT)
Dept: CARDIOLOGY | Facility: MEDICAL CENTER | Age: 72
End: 2022-01-24
Attending: INTERNAL MEDICINE
Payer: MEDICARE

## 2022-01-24 DIAGNOSIS — I47.10 SVT (SUPRAVENTRICULAR TACHYCARDIA) (HCC): ICD-10-CM

## 2022-01-24 DIAGNOSIS — I49.1 PREMATURE ATRIAL CONTRACTIONS: ICD-10-CM

## 2022-01-24 DIAGNOSIS — I49.3 PVC (PREMATURE VENTRICULAR CONTRACTION): ICD-10-CM

## 2022-01-24 DIAGNOSIS — I48.0 PAROXYSMAL ATRIAL FIBRILLATION (HCC): ICD-10-CM

## 2022-01-31 ENCOUNTER — TELEPHONE (OUTPATIENT)
Dept: CARDIOLOGY | Facility: MEDICAL CENTER | Age: 72
End: 2022-01-31

## 2022-01-31 NOTE — TELEPHONE ENCOUNTER
TW    Patient is asking for someone to reach out to him with the results of his monitor. He dropped it off on 01-.    Thank You  Ana hamilton

## 2022-02-03 LAB — EKG IMPRESSION: NORMAL

## 2022-02-03 PROCEDURE — 93224 XTRNL ECG REC UP TO 48 HRS: CPT | Performed by: INTERNAL MEDICINE

## 2022-02-05 ENCOUNTER — SLEEP STUDY (OUTPATIENT)
Dept: SLEEP MEDICINE | Facility: MEDICAL CENTER | Age: 72
End: 2022-02-05
Attending: NURSE PRACTITIONER
Payer: MEDICARE

## 2022-02-05 DIAGNOSIS — G47.33 OSA (OBSTRUCTIVE SLEEP APNEA): ICD-10-CM

## 2022-02-08 ENCOUNTER — TELEPHONE (OUTPATIENT)
Dept: CARDIOLOGY | Facility: MEDICAL CENTER | Age: 72
End: 2022-02-08
Payer: MEDICARE

## 2022-02-08 NOTE — TELEPHONE ENCOUNTER
GABRIELA MOSCOSO Key: [id number] - PA Case ID: [id number] help? Call us at [phone]  Status  Sent to [employer]  Drug  Valsartan 160MG tablets  Form  [employer] Medicare Part D Electronic Prior Authorization Form (2017 NCPDP)

## 2022-02-10 NOTE — PROCEDURES
MONTAGE: Standard  STUDY TYPE: Treatment  RECORDING TECHNIQUE:   After the scalp was prepared, gold plated electrodes were applied to the scalp according to the International 10-20 System. EEG (electroencephalogram) was continuously monitored from the O1-M2, O2-M1, C3-M2, C4-M1, F3-M2, and F4-M1. EOGs (electrooculograms) were monitored by electrodes placed at the left and right outer canthi. Chin EMG (electromyogram) was monitored by electrodes placed on the mentalis and sub-mentalis muscles. Nasal and oral airflow were monitored using a triple port thermocouple as well as oronasal pressure transducer. Respiratory effort was measured by inductive plethysmography technology employing abdominal and thoracic belts. Blood oxygen saturation and pulse were monitored by pulse oximetry. Heart rhythm was monitored by surface electrocardiogram. Leg EMG was studied using surface electrodes placed on left and right anterior tibialis. A microphone was used to monitor tracheal sounds and snoring. Body position was monitored and documented by technician observation.   SCORING CRITERIA:   A modification of the AASM manual for scoring of sleep and associated events was used. Obstructive apneas were scored by cessation of airflow for at least 10 seconds with continuing respiratory effort. Central apneas were scored by cessation of airflow for at least 10 seconds with no respiratory effort. Hypopneas were scored by a 30% or more reduction in airflow for at least 10 seconds accompanied by arterial oxygen desaturation of 3% or an arousal. For CMS (Medicare) patients, per AASM rule 1B, hypopneas are scored by 30% with mild reduction in airflow for at least 10 seconds accompanied by arterial saturation decreased at 4%.  Study start time was 11:16:15 PM. Diagnostic recording time was 6h 59.0m with a total sleep time of 3h 22.5m resulting in a sleep efficiency of 48.33%%. Sleep latency from the start of the study was 17 minutes and the  latency from sleep to REM was 184 minutes. In total,90 arousals were scored for an arousal index of 26.7.  Respiratory:  There were a total of 124 apneas consisting of 3 obstructive apneas, 0 mixed apneas, and 121 central apneas. A total of 59 hypopneas were scored. The apnea index was 36.74 per hour and the hypopnea index was 17.48 per hour resulting in an overall AHI of 54.22. AHI during rem was 29.3 and AHI while supine was 81.26.  Oximetry:  There was a mean oxygen saturation of 93.0%. The minimum oxygen saturation in NREM was 84.0 % and in REM was 87.0. The patient spent 8.9 minutes of TST with SaO2 <88%.  Cardiac:  The highest heart rate seen while awake was 101 BPM while the highest heart rate during sleep was 76 BPM with an average sleeping heart rate of 65 BPM.  Limb Movements:  There were a total of 338 PLMs during sleep which resulted in a PLMS index of 100.1. Of these, 50 were associated with arousals which resulted in a PLMS arousal index of 14.8.  Titration: CPAP was tired from 5 to 15cm H2O. BiPAP 18/14 to 22/16cm H2O.  This was a fully attended sleep study. This test was technically adequate.    The patient chose to use a medium AirFit F30 I mask.  The technician initiated treatment with CPAP 5 cmH2O and increased the pressure to a maximum of CPAP 15 cm water.  The apnea-hypopnea index failed to normalize in any tested CPAP pressure prompting the technician to try bilevel.  Unfortunately the apnea-hypopnea index failed to normalize on any tested bilevel pressure as well secondary to treatment emergent central apnea.  Central apneas comprised 66.1%  and central hypopneas 2.2% of the respiratory events during the titration.            ASSESSMENT:    Incomplete PAP titration secondary to treatment emergent central apnea        RECOMMENDATION:    Recommend an ASV titration.  The patient is 10/20/2021 echo showed an ejection fraction of 65%.            Dr. Sonu Palacios MD

## 2022-02-11 DIAGNOSIS — G47.33 OSA (OBSTRUCTIVE SLEEP APNEA): ICD-10-CM

## 2022-02-11 PROCEDURE — 95811 POLYSOM 6/>YRS CPAP 4/> PARM: CPT | Performed by: INTERNAL MEDICINE

## 2022-02-11 RX ORDER — ZOLPIDEM TARTRATE 5 MG/1
5 TABLET ORAL NIGHTLY PRN
Qty: 3 TABLET | Refills: 0 | Status: SHIPPED | OUTPATIENT
Start: 2022-02-11 | End: 2022-02-14

## 2022-02-11 NOTE — TELEPHONE ENCOUNTER
Call patient to discuss his recent titration study.  Previous titration was incomplete PAP titration secondary to treatment emergent central apnea.  Central apneas comprise 66.1% and central hypopneas were 2.2% of the respiratory events during titration.  He was tried on CPAP and BiPAP but unfortunately did not get to initiate ASV before time ran out.  I discussed this with the patient and he agreed to redoing a titration on ASV.  Order will be placed for titration as well as Ambien to help him sleep through the sleep study.  This will be completed before his follow-up visit with me on 3/18/2022.

## 2022-02-18 RX ORDER — ZOLPIDEM TARTRATE 5 MG/1
5 TABLET ORAL NIGHTLY PRN
Qty: 3 TABLET | Refills: 0 | Status: SHIPPED | OUTPATIENT
Start: 2022-02-18 | End: 2022-03-29

## 2022-02-27 ENCOUNTER — SLEEP STUDY (OUTPATIENT)
Dept: SLEEP MEDICINE | Facility: MEDICAL CENTER | Age: 72
End: 2022-02-27
Attending: NURSE PRACTITIONER
Payer: MEDICARE

## 2022-02-27 DIAGNOSIS — G47.33 OSA (OBSTRUCTIVE SLEEP APNEA): ICD-10-CM

## 2022-02-27 PROCEDURE — 95811 POLYSOM 6/>YRS CPAP 4/> PARM: CPT | Performed by: INTERNAL MEDICINE

## 2022-02-28 ENCOUNTER — HOSPITAL ENCOUNTER (OUTPATIENT)
Dept: LAB | Facility: MEDICAL CENTER | Age: 72
End: 2022-02-28
Attending: NURSE PRACTITIONER
Payer: MEDICARE

## 2022-02-28 DIAGNOSIS — R73.03 PREDIABETES: ICD-10-CM

## 2022-02-28 LAB
ANION GAP SERPL CALC-SCNC: 10 MMOL/L (ref 7–16)
BUN SERPL-MCNC: 16 MG/DL (ref 8–22)
CALCIUM SERPL-MCNC: 9.3 MG/DL (ref 8.5–10.5)
CHLORIDE SERPL-SCNC: 100 MMOL/L (ref 96–112)
CO2 SERPL-SCNC: 27 MMOL/L (ref 20–33)
CREAT SERPL-MCNC: 1.06 MG/DL (ref 0.5–1.4)
EST. AVERAGE GLUCOSE BLD GHB EST-MCNC: 137 MG/DL
GLUCOSE SERPL-MCNC: 118 MG/DL (ref 65–99)
HBA1C MFR BLD: 6.4 % (ref 4–5.6)
POTASSIUM SERPL-SCNC: 4.2 MMOL/L (ref 3.6–5.5)
SODIUM SERPL-SCNC: 137 MMOL/L (ref 135–145)

## 2022-02-28 PROCEDURE — 36415 COLL VENOUS BLD VENIPUNCTURE: CPT

## 2022-02-28 PROCEDURE — 80048 BASIC METABOLIC PNL TOTAL CA: CPT

## 2022-02-28 PROCEDURE — 83036 HEMOGLOBIN GLYCOSYLATED A1C: CPT | Mod: GA

## 2022-02-28 NOTE — PROCEDURES
MONTAGE: Standard  STUDY TYPE: Treatment  RECORDING TECHNIQUE:   After the scalp was prepared, gold plated electrodes were applied to the scalp according to the International 10-20 System. EEG (electroencephalogram) was continuously monitored from the O1-M2, O2-M1, C3-M2, C4-M1, F3-M2, and F4-M1. EOGs (electrooculograms) were monitored by electrodes placed at the left and right outer canthi. Chin EMG (electromyogram) was monitored by electrodes placed on the mentalis and sub-mentalis muscles. Nasal and oral airflow were monitored using a triple port thermocouple as well as oronasal pressure transducer. Respiratory effort was measured by inductive plethysmography technology employing abdominal and thoracic belts. Blood oxygen saturation and pulse were monitored by pulse oximetry. Heart rhythm was monitored by surface electrocardiogram. Leg EMG was studied using surface electrodes placed on left and right anterior tibialis. A microphone was used to monitor tracheal sounds and snoring. Body position was monitored and documented by technician observation.   SCORING CRITERIA:   A modification of the AASM manual for scoring of sleep and associated events was used. Obstructive apneas were scored by cessation of airflow for at least 10 seconds with continuing respiratory effort. Central apneas were scored by cessation of airflow for at least 10 seconds with no respiratory effort. Hypopneas were scored by a 30% or more reduction in airflow for at least 10 seconds accompanied by arterial oxygen desaturation of 3% or an arousal. For CMS (Medicare) patients, per AASM rule 1B, hypopneas are scored by 30% with mild reduction in airflow for at least 10 seconds accompanied by arterial saturation decreased at 4%.  Study start time was 08:49:32 PM. Diagnostic recording time was 8h 30.0m with a total sleep time of 6h 15.5m resulting in a sleep efficiency of 73.63%%. Sleep latency from the start of the study was 35 minutes and the  latency from sleep to REM was 206 minutes. In total,98 arousals were scored for an arousal index of 15.7.    Respiratory:  There were a total of 6 apneas consisting of 0 obstructive apneas, 0 mixed apneas, and 6 central apneas. A total of 49 hypopneas were scored. The apnea index was 0.96 per hour and the hypopnea index was 7.83 per hour resulting in an overall AHI of 8.79. AHI during rem was 5.0 and AHI while supine was 12.41.  Oximetry:  There was a mean oxygen saturation of 92.0%. The minimum oxygen saturation in NREM was 85.0 % and in REM was 90.0. The patient spent 31.4 minutes of TST with SaO2 <88%.  Cardiac:  The highest heart rate seen while awake was 95 BPM while the highest heart rate during sleep was 84 BPM with an average sleeping heart rate of 63 BPM.  Limb Movements:  There were a total of 604 PLMs during sleep which resulted in a PLMS index of 96.5. Of these, 68 were associated with arousals which resulted in a PLMS arousal index of 10.9.  Titration: ASV was tried from EPAP 6cm, Min PS 3cm, and Max PS 15cm and ended at of EPAP 11cm, Min PS 3cm, and Max PS 14cm  This was a fully attended sleep study. This test was technically adequate.  The patient chose to use a medium air fit F 30 I mask and heated humidification.    The technician initiated treatment with EPAP 6 cm, pressure support 3/15 cm water cmH2O and increased the pressure to a final setting of 11/3/2014 centimeters water.    The patient did best on ASV the 11/3/15 centimeters water with a resultant AHI of 1.5, a minimum saturation of 88%, and a mean saturation of 94%.  The titration with ASV 11/3/2015 centimeters water included nonsupine REM sleep.        ASSESSMENT:    Acceptable ASV titration to a best pressure of 11/3/15 centimeters water with a resultant AHI of 1.5, a maria guadalupe saturation of 80%, a mean saturation of 94%, and the achievement of nonsupine REM sleep.         RECOMMENDATION:    Recommend ASV 11/3/15 centimeters water cmH2O using  a medium air fit F 30 I mask and heated humidification followed by data card and clinical review in 6-8 weeks.        Dr. Sonu Palacios MD

## 2022-03-18 ENCOUNTER — OFFICE VISIT (OUTPATIENT)
Dept: SLEEP MEDICINE | Facility: MEDICAL CENTER | Age: 72
End: 2022-03-18
Payer: MEDICARE

## 2022-03-18 VITALS
HEIGHT: 72 IN | HEART RATE: 93 BPM | OXYGEN SATURATION: 95 % | WEIGHT: 279.3 LBS | SYSTOLIC BLOOD PRESSURE: 118 MMHG | BODY MASS INDEX: 37.83 KG/M2 | RESPIRATION RATE: 16 BRPM | DIASTOLIC BLOOD PRESSURE: 62 MMHG

## 2022-03-18 DIAGNOSIS — G47.33 OSA (OBSTRUCTIVE SLEEP APNEA): ICD-10-CM

## 2022-03-18 PROCEDURE — 99214 OFFICE O/P EST MOD 30 MIN: CPT | Performed by: NURSE PRACTITIONER

## 2022-03-18 RX ORDER — ALPRAZOLAM 0.25 MG/1
0.25 TABLET ORAL NIGHTLY PRN
COMMUNITY
Start: 2022-03-09 | End: 2022-11-16

## 2022-03-18 ASSESSMENT — FIBROSIS 4 INDEX: FIB4 SCORE: 1.58

## 2022-03-18 NOTE — PROGRESS NOTES
No chief complaint on file.      HPI:  Warren Templeton is a 72 y.o. year old male here today for follow-up on JIGNA and sleep study results.  Last seen 1/21/2022 by me.     Sleep history:  Home sleep study (1/7/2022):  Severe sleep apnea - CHARLEY 35.2  Moderate to severe nocturnal desaturation - maria guadalupe saturation 81% - less than 90% for 36% of the evaluation time.    Titration study (2/5/2022):  medium AirFit F30 I mask.  The technician initiated treatment with CPAP 5 cmH2O and increased the pressure to a maximum of CPAP 15 cm water.  The apnea-hypopnea index failed to normalize in any tested CPAP pressure prompting the technician to try bilevel.  Unfortunately the apnea-hypopnea index failed to normalize on any tested bilevel pressure as well secondary to treatment emergent central apnea.  Central apneas comprised 66.1%  and central hypopneas 2.2% of the respiratory events during the titration.   Incomplete PAP titration secondary to treatment emergent central apnea    Titration study (2/27/2022):  Acceptable ASV titration to a best pressure of 11/3/15 centimeters water with a resultant AHI of 1.5, a maria guadalupe saturation of 80%, a mean saturation of 94%, and the achievement of nonsupine REM sleep.       RECOMMENDATION:   Recommend ASV 11/3/15 centimeters water cmH2O using a medium air fit F 30 I mask and heated humidification    ROS: As per HPI and otherwise negative if not stated.    Past Medical History:   Diagnosis Date   • Arthritis     all over   • Benign hypertensive heart disease without heart failure     Disabling from his career as a , occupation related.    • Breath shortness    • Chest pain, unspecified     ~2000 with negative cardiac cath by [facility]   • Chronic anticoagulation 11/11/2016   • Cold 01/14/2018   • Diastolic dysfunction    • Head ache    • Headache, chronic daily    • Hemorrhagic disorder (HCC)     Eliquis   • High cholesterol    • Hypertension    • MR (mitral regurgitation)     • Osteoarthritis    • Paroxysmal atrial fibrillation (HCC) 2/28/2017   • Pure hypercholesterolemia     [facility]   • RLS (restless legs syndrome)        Past Surgical History:   Procedure Laterality Date   • CATARACT EXTRACTION WITH IOL  2020   • CERVICAL DISK AND FUSION ANTERIOR      2 level   • GASTRIC BYPASS LAPAROSCOPIC      With Daron-en-Y   • KNEE ARTHROPLASTY TOTAL Bilateral    • OTHER ORTHOPEDIC SURGERY  2007 note;    Bilateral, with musculoskelatal limitations       Family History   Problem Relation Age of Onset   • Hypertension Mother    • Cancer Mother         uterine, leukemia, skin   • Cancer Father         Esophageal   • Cancer Brother         Brain   • Hypertension Brother        Allergies as of 03/18/2022 - Reviewed 01/21/2022   Allergen Reaction Noted   • Magnesium oxide Rash 05/09/2018        Vitals:  There were no vitals taken for this visit.    Current medications as of today   Current Outpatient Medications   Medication Sig Dispense Refill   • zolpidem (AMBIEN) 5 MG Tab Take 1 Tablet by mouth at bedtime as needed for Sleep (1 to 3 po qhs prn insomnia/sleep study. Bring to sleep study.) for up to 3 doses. 3 Tablet 0   • furosemide (LASIX) 20 MG Tab TAKE 1 TABLET BY MOUTH EVERY DAY 90 Tablet 1   • valsartan (DIOVAN) 160 MG Tab Take 1 Tablet by mouth 2 times a day. 180 Tablet 2   • naproxen (NAPROSYN) 500 MG Tab TAKE 1 TABLET BY MOUTH AS NEEDED(NO MORE THATN 2 TIMES PER WEEK) CAUTION WHEN USING WITH ELIQUIS 90 Tablet 0   • ELIQUIS 5 MG Tab TAKE 1 TABLET BY MOUTH TWICE DAILY 180 Tablet 3   • metoprolol SR (TOPROL XL) 50 MG TABLET SR 24 HR TAKE 1 TABLET BY MOUTH TWICE DAILY 180 Tablet 3   • amLODIPine (NORVASC) 10 MG Tab TAKE 1 TABLET BY MOUTH EVERY DAY 90 Tablet 3   • potassium chloride SA (K-DUR) 10 MEQ Tab CR TAKE 2 TABLETS BY MOUTH EVERY  Tablet 3   • meclizine (ANTIVERT) 12.5 MG Tab TAKE 1 TABLET BY MOUTH THREE TIMES DAILY AS NEEDED 30 tablet 1   • Homeopathic Products (CLEAR  TINNITUS PO) Take  by mouth.     • Prenatal Multivit-Min-Fe-FA (PRE- PO) Take 1 capsule by mouth.     • fluticasone (FLONASE) 50 MCG/ACT nasal spray Administer 1 spray into each nostril twice daily for 2 weeks, then daily thereafter. 16 g 3   • fluocinonide (LIDEX) 0.05 % external solution Apply 0.05 Applicators to affected area(s) every day.  3   • Multiple Vitamins-Minerals (OPTISOURCE POST BARIATRIC SURG PO) Take 1 Tab by mouth 2 Times a Day.     • cyanocobalamin (VITAMIN B-12) 500 MCG TABS Take 500 mcg by mouth every day.     • omeprazole (PRILOSEC) 20 MG CPDR Take 20 mg by mouth 2 times a day.     • tramadol (ULTRAM) 50 MG TABS Take  mg by mouth every four hours as needed for Severe Pain.     • ascorbic acid (ASCORBIC ACID) 500 MG TABS Take 500 mg by mouth 2 Times a Day.     • Calcium Citrate (CITRACAL PO) Take 2 Tabs by mouth 2 Times a Day.       No current facility-administered medications for this visit.         Physical Exam:   Gen:           Alert and oriented, No apparent distress. Mood and affect appropriate, normal interaction with examiner.  Eyes:          PERRL, EOM intact, sclere white, conjunctive moist.  Ears:          Not examined.   Hearing:     Grossly intact.  Nose:          Normal, no lesions or deformities.  Dentition:    Good dentition.  Oropharynx:   Tongue normal, posterior pharynx without erythema or exudate.  Neck:        Supple, trachea midline, no masses.  Respiratory Effort: No intercostal retractions or use of accessory muscles.   Lung Auscultation:      Clear to auscultation bilaterally; no rales, rhonchi or wheezing.  CV:            Regular rate and rhythm. No murmurs, rubs or gallops.  Abd:           Not examined.   Lymphadenopathy: Not examined.  Gait and Station: Normal.  Digits and Nails: No clubbing, cyanosis, petechiae, or nodes.   Cranial Nerves: II-XII grossly intact.  Skin:        No rashes, lesions or ulcers noted.               Ext:           No cyanosis or  edema.      Assessment:  1. JIGNA (obstructive sleep apnea)         Plan:  1.  Reviewed titration study and sleep studies with patient.  Patient is amendable to initiating therapy at this time.  Order was placed for ASV machine at recommended settings.  Patient will follow up in the first 90 days for compliance check.    Please note that this dictation was created using voice recognition software. I have made every reasonable attempt to correct obvious errors, but it is possible there are errors of grammar and possibly content that I did not discover before finalizing the note.

## 2022-03-18 NOTE — PATIENT INSTRUCTIONS
" I reviewed with the patient the pathophysiology of obstructive sleep apnea, as well as potential cardiac and neurologic risks associated with untreated sleep apnea including CAD, HTN, pulmonary arterial hypertension, cardiac arrhythmias, heart attack or stroke.  [facility] patient's have increased risk of motor vehicle accidents, DM type II, chronic kidney disease and nonalcoholic liver disease.  He is cautioned against driving while sleepy for his safety and safety of others on the road. We reviewed treatment modalities for sleep apnea including CPAP/BiPAP therapy, ENT referral, dental appliance.      DME : [facility]  Address: [address] [place] [address]  Phone: [phone]    Once you receive your new PAP machine from the [employer] company you're referred to, we must see you back for an office visit between 30-90 days of you using the machine to review compliance.  If you were ordered an ASV machine, we need to see you in office no sooner than your 60th day on therapy.     This is a very important time frame for insurance purposes. If you do not follow up with our office for compliance your insurance may not continue to pay for the machine. Also if you do not use the machine for at least 4 hours each night, you may be deemed \"Incompliant\", in which case the insurance may also not continue to pay for the machine.    If you are incompliant, you may have to surrender your machine to the [employer] company and start the process over if you wish to continue therapy after that. Meaning a new office consult and new sleep studies.    For your first visit back with our office, please bring the whole machine with the power cord. We will download the compliance off the SD card in the machine, and are able to make changes if need be in office. Some machines have a modem and we can access the data wirelessly, if this is the case please make sure the DME company grants us access to your machine.  For all " follow up appointments after that, you will only need to bring the SD card to the appointment.    If you are having any issues with the mask, you have a 30 day window to exchange and try something else with your DME company.  If you are having issues with the pressure, please call our office at [phone].  These issues can cause you to not be able to use machine appropriately, there for make you incompliant.    Please call our office if you have any questions.    Thank you, [facility].  [phone]

## 2022-03-21 ENCOUNTER — TELEPHONE (OUTPATIENT)
Dept: CARDIOLOGY | Facility: MEDICAL CENTER | Age: 72
End: 2022-03-21
Payer: MEDICARE

## 2022-03-21 NOTE — TELEPHONE ENCOUNTER
GABRIELA MOSCOSO (Key: [id number])  Eliquis 5MG tablets  Form  [employer] Part D Electronic Prior Authorization Form (2017 NCPDP)  Wait for Determination  Please wait for [employer] 2017 NCPDP to return a determination.

## 2022-03-25 ENCOUNTER — HOSPITAL ENCOUNTER (OUTPATIENT)
Dept: LAB | Facility: MEDICAL CENTER | Age: 72
End: 2022-03-25
Attending: STUDENT IN AN ORGANIZED HEALTH CARE EDUCATION/TRAINING PROGRAM
Payer: MEDICARE

## 2022-03-25 LAB
25(OH)D3 SERPL-MCNC: 41 NG/ML (ref 30–100)
BASOPHILS # BLD AUTO: 0.7 % (ref 0–1.8)
BASOPHILS # BLD: 0.04 K/UL (ref 0–0.12)
EOSINOPHIL # BLD AUTO: 0.22 K/UL (ref 0–0.51)
EOSINOPHIL NFR BLD: 4.1 % (ref 0–6.9)
ERYTHROCYTE [DISTWIDTH] IN BLOOD BY AUTOMATED COUNT: 47.5 FL (ref 35.9–50)
FERRITIN SERPL-MCNC: 233 NG/ML (ref 22–322)
FOLATE SERPL-MCNC: >40 NG/ML
HCT VFR BLD AUTO: 43.6 % (ref 42–52)
HGB BLD-MCNC: 14.2 G/DL (ref 14–18)
IMM GRANULOCYTES # BLD AUTO: 0.01 K/UL (ref 0–0.11)
IMM GRANULOCYTES NFR BLD AUTO: 0.2 % (ref 0–0.9)
IRON SATN MFR SERPL: 40 % (ref 15–55)
IRON SERPL-MCNC: 92 UG/DL (ref 50–180)
LYMPHOCYTES # BLD AUTO: 2.12 K/UL (ref 1–4.8)
LYMPHOCYTES NFR BLD: 39.2 % (ref 22–41)
MCH RBC QN AUTO: 29.8 PG (ref 27–33)
MCHC RBC AUTO-ENTMCNC: 32.6 G/DL (ref 33.7–35.3)
MCV RBC AUTO: 91.6 FL (ref 81.4–97.8)
MONOCYTES # BLD AUTO: 0.73 K/UL (ref 0–0.85)
MONOCYTES NFR BLD AUTO: 13.5 % (ref 0–13.4)
NEUTROPHILS # BLD AUTO: 2.29 K/UL (ref 1.82–7.42)
NEUTROPHILS NFR BLD: 42.3 % (ref 44–72)
NRBC # BLD AUTO: 0 K/UL
NRBC BLD-RTO: 0 /100 WBC
PLATELET # BLD AUTO: 229 K/UL (ref 164–446)
PMV BLD AUTO: 10.8 FL (ref 9–12.9)
RBC # BLD AUTO: 4.76 M/UL (ref 4.7–6.1)
TIBC SERPL-MCNC: 232 UG/DL (ref 250–450)
UIBC SERPL-MCNC: 140 UG/DL (ref 110–370)
VIT B12 SERPL-MCNC: 738 PG/ML (ref 211–911)
WBC # BLD AUTO: 5.4 K/UL (ref 4.8–10.8)

## 2022-03-25 PROCEDURE — 36415 COLL VENOUS BLD VENIPUNCTURE: CPT

## 2022-03-25 PROCEDURE — 82306 VITAMIN D 25 HYDROXY: CPT | Mod: GA

## 2022-03-25 PROCEDURE — 82607 VITAMIN B-12: CPT

## 2022-03-25 PROCEDURE — 83550 IRON BINDING TEST: CPT

## 2022-03-25 PROCEDURE — 82728 ASSAY OF FERRITIN: CPT

## 2022-03-25 PROCEDURE — 83540 ASSAY OF IRON: CPT

## 2022-03-25 PROCEDURE — 85025 COMPLETE CBC W/AUTO DIFF WBC: CPT

## 2022-03-25 PROCEDURE — 82746 ASSAY OF FOLIC ACID SERUM: CPT

## 2022-04-11 ENCOUNTER — TELEPHONE (OUTPATIENT)
Dept: SLEEP MEDICINE | Facility: MEDICAL CENTER | Age: 72
End: 2022-04-11
Payer: MEDICARE

## 2022-04-11 NOTE — TELEPHONE ENCOUNTER
VOICEMAIL  1. Caller Name: Warren                            Call Back Number: [phone]    2. Message: Pt called and left  on 4/8/2022. He stated that he would like a update on the status of his CPAP order. Please call him with an updated.     3. Patient approves office to leave a detailed voicemail/MyChart message: N\A

## 2022-04-13 ENCOUNTER — OFFICE VISIT (OUTPATIENT)
Dept: CARDIOLOGY | Facility: MEDICAL CENTER | Age: 72
End: 2022-04-13
Payer: MEDICARE

## 2022-04-13 VITALS
SYSTOLIC BLOOD PRESSURE: 130 MMHG | WEIGHT: 277.6 LBS | DIASTOLIC BLOOD PRESSURE: 84 MMHG | BODY MASS INDEX: 37.6 KG/M2 | OXYGEN SATURATION: 94 % | HEART RATE: 83 BPM | HEIGHT: 72 IN | RESPIRATION RATE: 14 BRPM

## 2022-04-13 DIAGNOSIS — I48.0 PAROXYSMAL ATRIAL FIBRILLATION (HCC): ICD-10-CM

## 2022-04-13 DIAGNOSIS — Z79.01 CHRONIC ANTICOAGULATION: Chronic | ICD-10-CM

## 2022-04-13 DIAGNOSIS — Z98.890 HISTORY OF CARDIAC RADIOFREQUENCY ABLATION: ICD-10-CM

## 2022-04-13 PROCEDURE — 99214 OFFICE O/P EST MOD 30 MIN: CPT | Performed by: INTERNAL MEDICINE

## 2022-04-13 ASSESSMENT — FIBROSIS 4 INDEX: FIB4 SCORE: 1.53

## 2022-04-13 NOTE — PROGRESS NOTES
Chief Complaint   Patient presents with   • Atrial Fibrillation   • Anticoagulation     F/V Dx: Chronic anticoagulation   • Chest Pain     F/V Dx: Other chest pain       Subjective:   Warren Templeton is a 71 y.o. male who presents today for follow-up of atrial fibrillation status post atrial for ablation ablation with PVI by Dr. Reddy 2/12/2018 currently managed with oral anticoagulation in the form of Eliquis that he is tolerating well and antiarrhythmic therapy with dofetilide also tolerated well.  Has not had any recurrence of symptomatic atrial fibrillation since his ablation and was maintained on dofetilide prior to and subsequently with recent discontinuation.     Feeling well.  Vertigo improving.    Past Medical History:   Diagnosis Date   • Arthritis     all over   • Benign hypertensive heart disease without heart failure     Disabling from his career as a , occupation related.    • Breath shortness    • Chest pain, unspecified     ~2000 with negative cardiac cath by [facility]   • Chronic anticoagulation 11/11/2016   • Cold 01/14/2018   • Diastolic dysfunction    • Head ache    • Headache, chronic daily    • Hemorrhagic disorder (HCC)     Eliquis   • High cholesterol    • Hypertension    • MR (mitral regurgitation)    • Osteoarthritis    • Paroxysmal atrial fibrillation (HCC) 2/28/2017   • Pure hypercholesterolemia     [facility]   • RLS (restless legs syndrome)      Past Surgical History:   Procedure Laterality Date   • CATARACT EXTRACTION WITH IOL  2020   • CERVICAL DISK AND FUSION ANTERIOR      2 level   • GASTRIC BYPASS LAPAROSCOPIC      With Daron-en-Y   • KNEE ARTHROPLASTY TOTAL Bilateral    • OTHER ORTHOPEDIC SURGERY  2007 note;    Bilateral, with musculoskelatal limitations     Family History   Problem Relation Age of Onset   • Hypertension Mother    • Cancer Mother         uterine, leukemia, skin   • Cancer Father         Esophageal   • Cancer Brother         Brain   •  Hypertension Brother      Social History     Socioeconomic History   • Marital status:      Spouse name: Not on file   • Number of children: Not on file   • Years of education: Not on file   • Highest education level: Not on file   Occupational History   • Not on file   Tobacco Use   • Smoking status: Never Smoker   • Smokeless tobacco: Never Used   Vaping Use   • Vaping Use: Never used   Substance and Sexual Activity   • Alcohol use: Yes     Alcohol/week: 1.2 oz     Types: 2 Cans of beer per week     Comment: 2-3X WEEK   • Drug use: No   • Sexual activity: Yes     Partners: Female     Comment: Ashlee    Other Topics Concern   • Not on file   Social History Narrative   • Not on file     Social Determinants of Health     Financial Resource Strain: Not on file   Food Insecurity: Not on file   Transportation Needs: Not on file   Physical Activity: Not on file   Stress: Not on file   Social Connections: Not on file   Intimate Partner Violence: Not on file   Housing Stability: Not on file     Allergies   Allergen Reactions   • Magnesium Oxide Rash     Sores on scalp, rash on head&body, itchy anus     Outpatient Encounter Medications as of 4/13/2022   Medication Sig Dispense Refill   • ALPRAZolam (XANAX) 0.25 MG Tab Take 0.25 mg by mouth at bedtime as needed.     • furosemide (LASIX) 20 MG Tab TAKE 1 TABLET BY MOUTH EVERY DAY 90 Tablet 1   • valsartan (DIOVAN) 160 MG Tab Take 1 Tablet by mouth 2 times a day. 180 Tablet 2   • naproxen (NAPROSYN) 500 MG Tab TAKE 1 TABLET BY MOUTH AS NEEDED(NO MORE THATN 2 TIMES PER WEEK) CAUTION WHEN USING WITH ELIQUIS 90 Tablet 0   • ELIQUIS 5 MG Tab TAKE 1 TABLET BY MOUTH TWICE DAILY 180 Tablet 3   • metoprolol SR (TOPROL XL) 50 MG TABLET SR 24 HR TAKE 1 TABLET BY MOUTH TWICE DAILY 180 Tablet 3   • amLODIPine (NORVASC) 10 MG Tab TAKE 1 TABLET BY MOUTH EVERY DAY 90 Tablet 3   • potassium chloride SA (K-DUR) 10 MEQ Tab CR TAKE 2 TABLETS BY MOUTH EVERY  Tablet 3   • meclizine  (ANTIVERT) 12.5 MG Tab TAKE 1 TABLET BY MOUTH THREE TIMES DAILY AS NEEDED 30 tablet 1   • Prenatal Multivit-Min-Fe-FA (PRE-SAMIRA PO) Take 1 Capsule by mouth 2 times a day.     • fluticasone (FLONASE) 50 MCG/ACT nasal spray Administer 1 spray into each nostril twice daily for 2 weeks, then daily thereafter. (Patient taking differently: Administer 1 Spray into affected nostril(S) as needed.) 16 g 3   • fluocinonide (LIDEX) 0.05 % external solution Apply 0.05 Applicators to affected area(s) every day.  3   • Multiple Vitamins-Minerals (OPTISOURCE POST BARIATRIC SURG PO) Take 1 Tab by mouth 2 Times a Day.     • cyanocobalamin (VITAMIN B-12) 500 MCG TABS Take 500 mcg by mouth every day.     • omeprazole (PRILOSEC) 20 MG CPDR Take 20 mg by mouth every day.     • tramadol (ULTRAM) 50 MG TABS Take  mg by mouth every four hours as needed for Severe Pain.     • ascorbic acid (ASCORBIC ACID) 500 MG TABS Take 500 mg by mouth 2 Times a Day.     • Calcium Citrate (CITRACAL PO) Take 2 Tabs by mouth 2 Times a Day.     • [DISCONTINUED] Homeopathic Products (CLEAR TINNITUS PO) Take  by mouth. (Patient not taking: Reported on 2022)       No facility-administered encounter medications on file as of 2022.     Review of Systems   All other systems reviewed and are negative.       Objective:   /84 (BP Location: Left arm, Patient Position: Sitting, BP Cuff Size: Adult)   Pulse 83   Resp 14   Ht 1.829 m (6')   Wt (!) 126 kg (277 lb 9.6 oz)   SpO2 94%   BMI 37.65 kg/m²     Physical Exam  Vitals reviewed.   Constitutional:       General: He is not in acute distress.     Appearance: He is well-developed. He is not diaphoretic.      Comments: Obese   HENT:      Head: Normocephalic and atraumatic.      Right Ear: External ear normal.      Left Ear: External ear normal.   Eyes:      General: No scleral icterus.        Right eye: No discharge.         Left eye: No discharge.      Conjunctiva/sclera: Conjunctivae normal.       Pupils: Pupils are equal, round, and reactive to light.   Neck:      Thyroid: No thyromegaly.      Vascular: No JVD.      Trachea: No tracheal deviation.   Cardiovascular:      Rate and Rhythm: Normal rate and regular rhythm. Occasional extrasystoles are present.     Chest Wall: PMI is not displaced.      Pulses:           Carotid pulses are 2+ on the right side and 2+ on the left side.       Radial pulses are 2+ on the left side.        Popliteal pulses are 2+ on the right side and 2+ on the left side.        Dorsalis pedis pulses are 2+ on the right side and 2+ on the left side.        Posterior tibial pulses are 2+ on the right side and 2+ on the left side.      Heart sounds: No murmur heard.    No friction rub. No gallop.   Pulmonary:      Effort: Pulmonary effort is normal. No respiratory distress.      Breath sounds: Normal breath sounds. No wheezing or rales.   Chest:      Chest wall: No tenderness.   Abdominal:      General: Bowel sounds are normal. There is no distension.      Palpations: Abdomen is soft.      Tenderness: There is no abdominal tenderness.   Musculoskeletal:         General: No tenderness or deformity. Normal range of motion.      Cervical back: Normal range of motion and neck supple.   Skin:     General: Skin is warm and dry.      Coloration: Skin is not pale.      Findings: No erythema or rash.   Neurological:      Mental Status: He is alert and oriented to person, place, and time.      Cranial Nerves: No cranial nerve deficit (cranial nerves II through XII grossly intact).      Coordination: Coordination normal.   Psychiatric:         Behavior: Behavior normal.         Thought Content: Thought content normal.     No significant change in since prior evaluation 12/12/2019    LABS:  Lab Results   Component Value Date/Time    CHOLSTRLTOT 172 07/20/2021 12:22 PM     (H) 07/20/2021 12:22 PM    HDL 43 07/20/2021 12:22 PM    TRIGLYCERIDE 106 07/20/2021 12:22 PM       Lab Results    Component Value Date/Time    WBC 5.4 2022 10:17 AM    RBC 4.76 2022 10:17 AM    HEMOGLOBIN 14.2 2022 10:17 AM    HEMATOCRIT 43.6 2022 10:17 AM    MCV 91.6 2022 10:17 AM    NEUTSPOLYS 42.30 (L) 2022 10:17 AM    LYMPHOCYTES 39.20 2022 10:17 AM    MONOCYTES 13.50 (H) 2022 10:17 AM    EOSINOPHILS 4.10 2022 10:17 AM    BASOPHILS 0.70 2022 10:17 AM     Lab Results   Component Value Date/Time    SODIUM 137 2022 07:09 AM    POTASSIUM 4.2 2022 07:09 AM    CHLORIDE 100 2022 07:09 AM    CO2 27 2022 07:09 AM    GLUCOSE 118 (H) 2022 07:09 AM    BUN 16 2022 07:09 AM    CREATININE 1.06 2022 07:09 AM     Lab Results   Component Value Date    HBA1C 6.4 (H) 2022      Lab Results   Component Value Date/Time    ALKPHOSPHAT 67 2021 12:22 PM    ASTSGOT 20 2021 12:22 PM    ALTSGPT 16 2021 12:22 PM    TBILIRUBIN 0.5 2021 12:22 PM      No results found for: BNPBTYPENAT   No results found for: TSH  Lab Results   Component Value Date/Time    PROTHROMBTM 15.0 (H) 2018 12:05 PM    INR 1.21 (H) 2018 12:05 PM      EKG (10/31/2018 ):  I have personally reviewed the EKG this visit and discussed with the patient.  Results for orders placed or performed in visit on 18   [facility] EKG (Clinic Performed)   Result Value Ref Range    Report       [facility]    Test Date:  2018  Pt Name:    GABRIELA MOSCOSO              Department: [facility]  MRN:        [id number]                      Room:  Gender:     Male                         Technician: SONI  :        1950                   Requested By:AMELIA YIP  Order #:    [id number]                    Reading MD: Noa Santiago MD    Measurements  Intervals                                Axis  Rate:       70                           P:          4  OK:         168                          QRS:        36  QRSD:       104                           T:          18  QT:         416  QTc:        449    Interpretive Statements  SINUS RHYTHM  BORDERLINE LOW VOLTAGE IN FRONTAL LEADS  PROBABLE POSTERIOR INFARCT  Compared to ECG 03/06/2018 13:21:41  Myocardial infarct finding now present  T-wave abnormality no longer present    Electronically Signed On 5-9-2018 15:10:52 PDT by Noa Santiago MD         ECHO CONCLUSIONS (7/11/2016):  Compared to the images of the prior study done on 06/21/12, no   significant change    Normal left ventricular size and systolic function.  Mild concentric left ventricular hypertrophy.  Left ventricular ejection fraction is visually estimated to be 65%.  Severely dilated left atrium.  Mitral annular calcification.  Mild mitral regurgitation.  Mild tricuspid regurgitation.  Estimated right ventricular systolic pressure  is 30 mmHg.  Moderately dilated right ventricle.    Assessment:     1. Chronic anticoagulation     2. Paroxysmal atrial fibrillation (HCC)     3. History of cardiac radiofrequency ablation         Medical Decision Making:  Today's Assessment / Status / Plan:     Doing well tolerating anticoagulation.  Continue indefinitely as tolerated.  Follow-up echocardiogram mild valvular disease remotely.  Expectant management for his rhythm.  Initiated on CPAP therapy after referral to sleep medicine for markedly abnormal overnight oximetry.  Notes some substernal burning after eating Mexican food relieved with Tums and Prilosec.  We discussed symptoms of concern.  Should these burning substernal pains begin to occur with activity or not relieved with Tums he will call for stress testing.

## 2022-05-23 ENCOUNTER — TELEPHONE (OUTPATIENT)
Dept: CARDIOLOGY | Facility: MEDICAL CENTER | Age: 72
End: 2022-05-23

## 2022-06-10 ENCOUNTER — TELEPHONE (OUTPATIENT)
Dept: SLEEP MEDICINE | Facility: MEDICAL CENTER | Age: 72
End: 2022-06-10
Payer: MEDICARE

## 2022-06-10 NOTE — TELEPHONE ENCOUNTER
[facility].  I called [facility] and was told that they do not know when they will get more machines in.  I left the [facility] a [facility] with this information.  I also gave him Yariel Lm's name and the number for [facility].

## 2022-06-29 DIAGNOSIS — I48.0 PAROXYSMAL ATRIAL FIBRILLATION (HCC): ICD-10-CM

## 2022-06-29 DIAGNOSIS — I11.9 BENIGN HYPERTENSIVE HEART DISEASE WITHOUT HEART FAILURE: ICD-10-CM

## 2022-06-29 DIAGNOSIS — I51.89 DIASTOLIC DYSFUNCTION: ICD-10-CM

## 2022-06-30 RX ORDER — POTASSIUM CHLORIDE 750 MG/1
TABLET, EXTENDED RELEASE ORAL
Qty: 180 TABLET | Refills: 3 | Status: SHIPPED | OUTPATIENT
Start: 2022-06-30 | End: 2023-07-28 | Stop reason: SDUPTHER

## 2022-08-09 ENCOUNTER — TELEPHONE (OUTPATIENT)
Dept: SLEEP MEDICINE | Facility: MEDICAL CENTER | Age: 72
End: 2022-08-09

## 2022-08-09 NOTE — TELEPHONE ENCOUNTER
[[id number]] - GABRIELA MOSCOSO  : 1950    OBZENA,    HOME SLEEP STUDY DONE ON 2022 - WE NEED THAT STUDY AND IT HAS TO STATE THAT IT WAS SCORED AT 4% DESATURATION.    From [facility].  Please advise, thank you.

## 2022-09-20 ENCOUNTER — TELEPHONE (OUTPATIENT)
Dept: CARDIOLOGY | Facility: MEDICAL CENTER | Age: 72
End: 2022-09-20
Payer: MEDICARE

## 2022-09-20 NOTE — TELEPHONE ENCOUNTER
Received walk-in form from pt. Pt stated he is planned to have back surgery with Dr. Acuna at [facility]. He is inquiring about clearance for surgery and how many days he can hold Eliquis. Will call pt back with recommendations.    To Dr. Ya - please advise on clearance and med hold instructions. Thank you!

## 2022-09-21 DIAGNOSIS — I48.0 PAROXYSMAL ATRIAL FIBRILLATION (HCC): ICD-10-CM

## 2022-09-23 DIAGNOSIS — I11.9 BENIGN HYPERTENSIVE HEART DISEASE WITHOUT HEART FAILURE: ICD-10-CM

## 2022-09-23 DIAGNOSIS — I51.89 DIASTOLIC DYSFUNCTION: ICD-10-CM

## 2022-09-23 DIAGNOSIS — I48.0 PAROXYSMAL ATRIAL FIBRILLATION (HCC): ICD-10-CM

## 2022-09-23 RX ORDER — APIXABAN 5 MG/1
TABLET, FILM COATED ORAL
Qty: 180 TABLET | Refills: 3 | Status: ON HOLD | OUTPATIENT
Start: 2022-09-23 | End: 2022-12-08

## 2022-09-25 RX ORDER — AMLODIPINE BESYLATE 10 MG/1
TABLET ORAL
Qty: 90 TABLET | Refills: 3 | Status: SHIPPED | OUTPATIENT
Start: 2022-09-25 | End: 2023-10-02 | Stop reason: SDUPTHER

## 2022-09-25 RX ORDER — METOPROLOL SUCCINATE 50 MG/1
TABLET, EXTENDED RELEASE ORAL
Qty: 180 TABLET | Refills: 3 | Status: SHIPPED | OUTPATIENT
Start: 2022-09-25 | End: 2023-10-02 | Stop reason: SDUPTHER

## 2022-09-27 NOTE — TELEPHONE ENCOUNTER
TW      **1 week follow up**      Caller: Warren    Office Name, phone number, fax number: [facility] - Dr. Liz / ph. [phone]    Fax clearance to [phone]    Procedure Name: Back surgery on 4 disc    Procedure Scheduled Date: TBD - waiting on TW approval    Callback Number: [phone]

## 2022-09-29 NOTE — TELEPHONE ENCOUNTER
Called pt and advised TW out of office however will be back tomorrow. Pt said his surgery is for his back and his pain has been worsening. Reassured pt will have form reviewed tomorrow ASAP and confirm his Eliquis hold instructions. Pt verbalized understanding and was very appreciative of call back.

## 2022-09-29 NOTE — TELEPHONE ENCOUNTER
TW    Caller: Warren Templeton    Topic/issue: CLEARANCE    Patient is requesting a call back to discuss why this clearance is taking so long because he is trying to get his pain resolved with this surgery. Please advise.    Thank you,  Malik BASHIR    Callback Number: [phone] (home)

## 2022-09-30 NOTE — TELEPHONE ENCOUNTER
Received completed clearance request from TW. Called pt and left detailed personal VM. Advised will also send message via [facility] and requested for him to call back or respond to message if he has any questions. [facility] message sent.    Clearance form faxed to:    [facility]  [phone] P  [phone] F    Received receipt for confirmation.

## 2022-10-03 NOTE — TELEPHONE ENCOUNTER
------------------------------------  See my chart message dated 9/30/22 and read by pt on 10/3/22

## 2022-10-03 NOTE — TELEPHONE ENCOUNTER
Caller: Felicita Kelley    Topic/issue: Warren is checking to be sure clearance has been signed and sent    Callback Number: [phone]    Thank you,   Susana CHAN

## 2022-10-04 ENCOUNTER — TELEPHONE (OUTPATIENT)
Dept: CARDIOLOGY | Facility: MEDICAL CENTER | Age: 72
End: 2022-10-04
Payer: MEDICARE

## 2022-10-04 ENCOUNTER — TELEPHONE (OUTPATIENT)
Dept: SLEEP MEDICINE | Facility: MEDICAL CENTER | Age: 72
End: 2022-10-04

## 2022-10-04 DIAGNOSIS — G47.31 COMPLEX SLEEP APNEA SYNDROME: ICD-10-CM

## 2022-10-04 NOTE — TELEPHONE ENCOUNTER
TW    Caller: Herman ()    Name and Department: [facility] [employer] ([employer])    Topic/Issue: REPORTS    Herman states that he is needing reports in order to continue covering the cost of the patients office visits as well as medication refills. Herman also states that he is needing a request for authorization as well. Please advise.    Fax: [phone]    Thank you,  Malik BASHIR    Callback Number or Extension: ROUTING COMMENTS

## 2022-10-04 NOTE — TELEPHONE ENCOUNTER
Jd Clements placed an order for a ASV.  He put PS max to be set at 15.  [facility] states that they cannot set the machine at 15 and the order needs to be changed to PS Max 14.  Please change the order and I will re-send it.  Thank you.

## 2022-10-12 ENCOUNTER — OFFICE VISIT (OUTPATIENT)
Dept: CARDIOLOGY | Facility: MEDICAL CENTER | Age: 72
End: 2022-10-12
Payer: MEDICARE

## 2022-10-12 VITALS
RESPIRATION RATE: 16 BRPM | OXYGEN SATURATION: 94 % | WEIGHT: 265 LBS | DIASTOLIC BLOOD PRESSURE: 78 MMHG | HEIGHT: 72 IN | SYSTOLIC BLOOD PRESSURE: 128 MMHG | BODY MASS INDEX: 35.89 KG/M2 | HEART RATE: 70 BPM

## 2022-10-12 DIAGNOSIS — Z98.890 HISTORY OF CARDIAC RADIOFREQUENCY ABLATION: ICD-10-CM

## 2022-10-12 DIAGNOSIS — I48.0 PAROXYSMAL ATRIAL FIBRILLATION (HCC): ICD-10-CM

## 2022-10-12 DIAGNOSIS — G47.33 OSA (OBSTRUCTIVE SLEEP APNEA): ICD-10-CM

## 2022-10-12 DIAGNOSIS — Z79.01 CHRONIC ANTICOAGULATION: ICD-10-CM

## 2022-10-12 DIAGNOSIS — I71.21 ANEURYSM OF ASCENDING AORTA WITHOUT RUPTURE (HCC): ICD-10-CM

## 2022-10-12 PROCEDURE — 99214 OFFICE O/P EST MOD 30 MIN: CPT | Performed by: INTERNAL MEDICINE

## 2022-10-12 RX ORDER — DIAZEPAM 5 MG/1
TABLET ORAL
COMMUNITY
Start: 2022-07-18 | End: 2022-11-16

## 2022-10-12 RX ORDER — BACLOFEN 10 MG/1
10 TABLET ORAL 3 TIMES DAILY
COMMUNITY
Start: 2022-09-14

## 2022-10-12 ASSESSMENT — FIBROSIS 4 INDEX: FIB4 SCORE: 1.53

## 2022-10-12 NOTE — PROGRESS NOTES
Chief Complaint   Patient presents with    Atrial Fibrillation     Follow up         Subjective:   Warren Templeton is a 72 y.o. male who presents today for follow-up of atrial fibrillation status post atrial for ablation ablation with PVI by Dr. Reddy 2/12/2018 currently managed with oral anticoagulation in the form of Eliquis that he is tolerating well and antiarrhythmic therapy with dofetilide also tolerated well.  Has not had any recurrence of symptomatic atrial fibrillation since his ablation and was maintained on dofetilide prior to and subsequently with recent discontinuation.     Since last visit tolerating anticoagulation well no current symptomology.    Past Medical History:   Diagnosis Date    Arthritis     all over    Benign hypertensive heart disease without heart failure     Disabling from his career as a , occupation related.     Breath shortness     Chest pain, unspecified     ~2000 with [facility] cardiac [facility]    Chronic anticoagulation 11/11/2016    Cold 01/14/2018    Diastolic dysfunction     Head ache     Headache, chronic daily     Hemorrhagic disorder (HCC)     Eliquis    High cholesterol     Hypertension     MR (mitral regurgitation)     Osteoarthritis     Paroxysmal atrial fibrillation (HCC) 2/28/2017    Pure hypercholesterolemia     [facility]    RLS (restless legs syndrome)      Past Surgical History:   Procedure Laterality Date    CATARACT EXTRACTION WITH IOL  2020    CERVICAL DISK AND FUSION ANTERIOR      2 level    GASTRIC BYPASS LAPAROSCOPIC      With Daron-en-Y    KNEE ARTHROPLASTY TOTAL Bilateral     OTHER ORTHOPEDIC SURGERY  2007 note;    Bilateral, with musculoskelatal limitations     Family History   Problem Relation Age of Onset    Hypertension Mother     Cancer Mother         uterine, leukemia, skin    Cancer Father         Esophageal    Cancer Brother         Brain    Hypertension Brother      Social History     Socioeconomic History    Marital  status:      Spouse name: Not on file    Number of children: Not on file    Years of education: Not on file    Highest education level: Not on file   Occupational History    Not on file   Tobacco Use    Smoking status: Never    Smokeless tobacco: Never   Vaping Use    Vaping Use: Never used   Substance and Sexual Activity    Alcohol use: Yes     Alcohol/week: 1.2 oz     Types: 2 Cans of beer per week     Comment: 2-3X WEEK    Drug use: No    Sexual activity: Yes     Partners: Female     Comment: Ashlee    Other Topics Concern    Not on file   Social History Narrative    Not on file     Social Determinants of Health     Financial Resource Strain: Not on file   Food Insecurity: Not on file   Transportation Needs: Not on file   Physical Activity: Not on file   Stress: Not on file   Social Connections: Not on file   Intimate Partner Violence: Not on file   Housing Stability: Not on file     Allergies   Allergen Reactions    Magnesium Oxide Rash     Sores on scalp, rash on head&body, itchy anus     Outpatient Encounter Medications as of 10/12/2022   Medication Sig Dispense Refill    baclofen (LIORESAL) 10 MG Tab Take 10 mg by mouth 3 times a day.      amLODIPine (NORVASC) 10 MG Tab TAKE 1 TABLET BY MOUTH EVERY DAY 90 Tablet 3    metoprolol SR (TOPROL XL) 50 MG TABLET SR 24 HR TAKE 1 TABLET BY MOUTH TWICE DAILY 180 Tablet 3    ELIQUIS 5 MG Tab TAKE 1 TABLET BY MOUTH TWICE DAILY 180 Tablet 3    potassium chloride SA (K-DUR) 10 MEQ Tab CR TAKE 2 TABLETS BY MOUTH EVERY  Tablet 3    furosemide (LASIX) 20 MG Tab TAKE 1 TABLET BY MOUTH EVERY DAY 90 Tablet 2    ALPRAZolam (XANAX) 0.25 MG Tab Take 0.25 mg by mouth at bedtime as needed.      valsartan (DIOVAN) 160 MG Tab Take 1 Tablet by mouth 2 times a day. 180 Tablet 2    naproxen (NAPROSYN) 500 MG Tab TAKE 1 TABLET BY MOUTH AS NEEDED(NO MORE THATN 2 TIMES PER WEEK) CAUTION WHEN USING WITH ELIQUIS 90 Tablet 0    meclizine (ANTIVERT) 12.5 MG Tab TAKE 1 TABLET BY  MOUTH THREE TIMES DAILY AS NEEDED 30 tablet 1    Prenatal Multivit-Min-Fe-FA (PRE-SAMIRA PO) Take 1 Capsule by mouth 2 times a day.      fluticasone (FLONASE) 50 MCG/ACT nasal spray Administer 1 spray into each nostril twice daily for 2 weeks, then daily thereafter. (Patient taking differently: Administer 1 Spray into affected nostril(S) as needed.) 16 g 3    fluocinonide (LIDEX) 0.05 % external solution Apply 0.05 Applicators to affected area(s) every day.  3    Multiple Vitamins-Minerals (OPTISOURCE POST BARIATRIC SURG PO) Take 1 Tab by mouth 2 Times a Day.      cyanocobalamin (VITAMIN B-12) 500 MCG TABS Take 500 mcg by mouth every day.      omeprazole (PRILOSEC) 20 MG CPDR Take 20 mg by mouth every 48 hours.      tramadol (ULTRAM) 50 MG TABS Take  mg by mouth every four hours as needed for Severe Pain.      ascorbic acid (ASCORBIC ACID) 500 MG TABS Take 500 mg by mouth 2 Times a Day.      Calcium Citrate (CITRACAL PO) Take 2 Tabs by mouth 2 Times a Day.      diazePAM (VALIUM) 5 MG Tab TAKE 1 TABLET BY MOUTH 30 MINUTES BEFORE MRI. REPEAT DOSE IN 30 MINUTES IF ONGOING SYMPTOMS. FOR NAUSEA (Patient not taking: Reported on 10/12/2022)       No facility-administered encounter medications on file as of 10/12/2022.     Review of Systems   All other systems reviewed and are negative.     Objective:   /78 (BP Location: Left arm, Patient Position: Sitting)   Pulse 70   Resp 16   Ht 1.829 m (6')   Wt 120 kg (265 lb)   SpO2 94%   BMI 35.94 kg/m²     Physical Exam  Vitals reviewed.   Constitutional:       General: He is not in acute distress.     Appearance: He is well-developed. He is not diaphoretic.      Comments: Obese   HENT:      Head: Normocephalic and atraumatic.      Right Ear: External ear normal.      Left Ear: External ear normal.   Eyes:      General: No scleral icterus.        Right eye: No discharge.         Left eye: No discharge.      Conjunctiva/sclera: Conjunctivae normal.      Pupils:  Pupils are equal, round, and reactive to light.   Neck:      Thyroid: No thyromegaly.      Vascular: No JVD.      Trachea: No tracheal deviation.   Cardiovascular:      Rate and Rhythm: Normal rate and regular rhythm. Occasional Extrasystoles are present.     Chest Wall: PMI is not displaced.      Pulses:           Carotid pulses are 2+ on the right side and 2+ on the left side.       Radial pulses are 2+ on the left side.        Popliteal pulses are 2+ on the right side and 2+ on the left side.        Dorsalis pedis pulses are 2+ on the right side and 2+ on the left side.        Posterior tibial pulses are 2+ on the right side and 2+ on the left side.      Heart sounds: No murmur heard.    No friction rub. No gallop.   Pulmonary:      Effort: Pulmonary effort is normal. No respiratory distress.      Breath sounds: Normal breath sounds. No wheezing or rales.   Chest:      Chest wall: No tenderness.   Abdominal:      General: Bowel sounds are normal. There is no distension.      Palpations: Abdomen is soft.      Tenderness: There is no abdominal tenderness.   Musculoskeletal:         General: No swelling, tenderness or deformity. Normal range of motion.      Cervical back: Normal range of motion and neck supple.   Skin:     General: Skin is warm and dry.      Coloration: Skin is not pale.      Findings: No erythema or rash.   Neurological:      Mental Status: He is alert and oriented to person, place, and time.      Cranial Nerves: No cranial nerve deficit (cranial nerves II through XII grossly intact).      Coordination: Coordination normal.   Psychiatric:         Behavior: Behavior normal.         Thought Content: Thought content normal.   No significant change in since prior evaluation 12/12/2019    LABS:  Lab Results   Component Value Date/Time    CHOLSTRLTOT 172 07/20/2021 12:22 PM     (H) 07/20/2021 12:22 PM    HDL 43 07/20/2021 12:22 PM    TRIGLYCERIDE 106 07/20/2021 12:22 PM       Lab Results    Component Value Date/Time    WBC 5.4 2022 10:17 AM    RBC 4.76 2022 10:17 AM    HEMOGLOBIN 14.2 2022 10:17 AM    HEMATOCRIT 43.6 2022 10:17 AM    MCV 91.6 2022 10:17 AM    NEUTSPOLYS 42.30 (L) 2022 10:17 AM    LYMPHOCYTES 39.20 2022 10:17 AM    MONOCYTES 13.50 (H) 2022 10:17 AM    EOSINOPHILS 4.10 2022 10:17 AM    BASOPHILS 0.70 2022 10:17 AM     Lab Results   Component Value Date/Time    SODIUM 137 2022 07:09 AM    POTASSIUM 4.2 2022 07:09 AM    CHLORIDE 100 2022 07:09 AM    CO2 27 2022 07:09 AM    GLUCOSE 118 (H) 2022 07:09 AM    BUN 16 2022 07:09 AM    CREATININE 1.06 2022 07:09 AM     Lab Results   Component Value Date    HBA1C 6.4 (H) 2022      Lab Results   Component Value Date/Time    ALKPHOSPHAT 67 2021 12:22 PM    ASTSGOT 20 2021 12:22 PM    ALTSGPT 16 2021 12:22 PM    TBILIRUBIN 0.5 2021 12:22 PM      No results found for: BNPBTYPENAT   No results found for: TSH  Lab Results   Component Value Date/Time    PROTHROMBTM 15.0 (H) 2018 12:05 PM    INR 1.21 (H) 2018 12:05 PM      EKG (10/31/2018 ):  I have personally reviewed the EKG this visit and discussed with the patient.  Results for orders placed or performed in visit on 18   [facility] EKG (Clinic Performed)   Result Value Ref Range    Report       [facility]    Test Date:  2018  Pt Name:    GABRIELA MOSCOSO              Department: [facility]  MRN:        [id number]                      Room:  Gender:     Male                         Technician: SONI  :        1950                   Requested By:AMELIA YIP  Order #:    [id number]                    Reading MD: Noa Santiago MD    Measurements  Intervals                                Axis  Rate:       70                           P:          4  WV:         168                          QRS:        36  QRSD:       104                           T:          18  QT:         416  QTc:        449    Interpretive Statements  SINUS RHYTHM  BORDERLINE LOW VOLTAGE IN FRONTAL LEADS  PROBABLE POSTERIOR INFARCT  Compared to ECG 03/06/2018 13:21:41  Myocardial infarct finding now present  T-wave abnormality no longer present    Electronically Signed On 5-9-2018 15:10:52 PDT by Noa Santiago MD         ECHO CONCLUSIONS (7/11/2016):  Compared to the images of the prior study done on 06/21/12, no   significant change    Normal left ventricular size and systolic function.  Mild concentric left ventricular hypertrophy.  Left ventricular ejection fraction is visually estimated to be 65%.  Severely dilated left atrium.  Mitral annular calcification.  Mild mitral regurgitation.  Mild tricuspid regurgitation.  Estimated right ventricular systolic pressure  is 30 mmHg.  Moderately dilated right ventricle.    Assessment:     1. Aneurysm of ascending aorta without rupture  EC-ECHOCARDIOGRAM COMPLETE W/O CONT      2. Chronic anticoagulation  Basic Metabolic Panel      3. Paroxysmal atrial fibrillation (HCC)        4. History of cardiac radiofrequency ablation        5. JIGNA (obstructive sleep apnea)            Medical Decision Making:  Today's Assessment / Status / Plan:     Doing well tolerating anticoagulation.  Continue indefinitely as tolerated.  This high risk medication is being monitored with testing ordered today.  Discussed his new diagnosis of thoracic aortic aneurysm which is borderline in size at 4 cm given his BSA and I recommend echocardiographic surveillance for this.  We discussed the expected prognosis and treatment strategies.  Otherwise no medical changes follow-up in 6 months.

## 2022-11-01 ENCOUNTER — TELEPHONE (OUTPATIENT)
Dept: CARDIOLOGY | Facility: MEDICAL CENTER | Age: 72
End: 2022-11-01
Payer: MEDICARE

## 2022-11-01 NOTE — TELEPHONE ENCOUNTER
Received urgent risk stratification from [facility] for patient to have surgical extraction of tooth #3 scheduled on 11/02/22.   P: [phone]  F: [phone]    Scanned document into [employer].     AL: Please advise in TW absence per care team. Asking how long to hold Eliquis, pre medication antibiotics or restrictions. Thanks!

## 2022-11-11 ENCOUNTER — OFFICE VISIT (OUTPATIENT)
Dept: SLEEP MEDICINE | Facility: MEDICAL CENTER | Age: 72
End: 2022-11-11
Payer: MEDICARE

## 2022-11-11 VITALS
RESPIRATION RATE: 16 BRPM | WEIGHT: 268 LBS | HEIGHT: 72 IN | OXYGEN SATURATION: 95 % | HEART RATE: 75 BPM | SYSTOLIC BLOOD PRESSURE: 124 MMHG | BODY MASS INDEX: 36.3 KG/M2 | DIASTOLIC BLOOD PRESSURE: 78 MMHG

## 2022-11-11 DIAGNOSIS — G47.33 OSA (OBSTRUCTIVE SLEEP APNEA): ICD-10-CM

## 2022-11-11 PROCEDURE — 99213 OFFICE O/P EST LOW 20 MIN: CPT | Performed by: NURSE PRACTITIONER

## 2022-11-11 ASSESSMENT — FIBROSIS 4 INDEX: FIB4 SCORE: 1.53

## 2022-11-11 NOTE — PROGRESS NOTES
Chief Complaint   Patient presents with    Apnea       HPI:  Warren Templeton is a 72 y.o. year old male here today for follow-up on JIGNA with first compliance on ASV machine. Previous complaints of snoring ast medical history of atrial fibrillation status post ablation with PVI  , Resuscitative snorts, previous [place] score of 18 out of 24, and endorses several nocturnal awakenings for nocturia with excessive daytime sleepiness and fatigue.    Patient received his ASV approximately 1 month ago.  He is currently using a ResMed AirFit F 30 mask.  He states he sleeps and short durations and has trouble staying asleep due to significant back pain.  He is currently awaiting surgery.  He does note improved sleep quality with using ASV, but is still tired and can fall asleep quickly during the day.    Compliance was reviewed for the last month and shows 87% use with an average time of 4 hours and 49 minutes and a resultant AHI of 10.5.  There is evidence of a mask leak with a median of 9.8 L/min 95th percentile of 69.6 L/min.      Sleep history:  Home sleep study (1/7/2022):  Severe sleep apnea - CHARLEY 35.2  Moderate to severe nocturnal desaturation - maria guadalupe saturation 81% - less than 90% for 36% of the evaluation time.     Titration study (2/5/2022):  medium AirFit F30 I mask.  The technician initiated treatment with CPAP 5 cmH2O and increased the pressure to a maximum of CPAP 15 cm water.  The apnea-hypopnea index failed to normalize in any tested CPAP pressure prompting the technician to try bilevel.  Unfortunately the apnea-hypopnea index failed to normalize on any tested bilevel pressure as well secondary to treatment emergent central apnea.  Central apneas comprised 66.1%  and central hypopneas 2.2% of the respiratory events during the titration.   Incomplete PAP titration secondary to treatment emergent central apnea     Titration study (2/27/2022):  Acceptable ASV titration to a best pressure of 11/3/15  centimeters water with a resultant AHI of 1.5, a maria guadalupe saturation of 80%, a mean saturation of 94%, and the achievement of nonsupine REM sleep.       RECOMMENDATION:   Recommend ASV 11/3/15 centimeters water cmH2O using a medium air fit F 30 I mask and heated humidification    ROS: As per HPI and otherwise negative if not stated.    Past Medical History:   Diagnosis Date    Arthritis     all over    Benign hypertensive heart disease without heart failure     Disabling from his career as a , occupation related.     Breath shortness     Chest pain, unspecified     ~2000 with negative cardiac cath by [facility]    Chronic anticoagulation 11/11/2016    Cold 01/14/2018    Diastolic dysfunction     Head ache     Headache, chronic daily     Hemorrhagic disorder (HCC)     Eliquis    High cholesterol     Hypertension     MR (mitral regurgitation)     Osteoarthritis     Paroxysmal atrial fibrillation (HCC) 2/28/2017    Pure hypercholesterolemia     [facility]    RLS (restless legs syndrome)        Past Surgical History:   Procedure Laterality Date    CATARACT EXTRACTION WITH IOL  2020    CERVICAL DISK AND FUSION ANTERIOR      2 level    GASTRIC BYPASS LAPAROSCOPIC      With Daron-en-Y    KNEE ARTHROPLASTY TOTAL Bilateral     OTHER ORTHOPEDIC SURGERY  2007 note;    Bilateral, with musculoskelatal limitations       Family History   Problem Relation Age of Onset    Hypertension Mother     Cancer Mother         uterine, leukemia, skin    Cancer Father         Esophageal    Cancer Brother         Brain    Hypertension Brother        Allergies as of 11/11/2022 - Reviewed 11/11/2022   Allergen Reaction Noted    Magnesium oxide Rash 05/09/2018        Vitals:  /78   Pulse 75   Resp 16   Ht 1.829 m (6')   Wt 122 kg (268 lb)   SpO2 95%     Current medications as of today   Current Outpatient Medications   Medication Sig Dispense Refill    baclofen (LIORESAL) 10 MG Tab Take 1 Tablet by mouth 3 times a day.       amLODIPine (NORVASC) 10 MG Tab TAKE 1 TABLET BY MOUTH EVERY DAY 90 Tablet 3    metoprolol SR (TOPROL XL) 50 MG TABLET SR 24 HR TAKE 1 TABLET BY MOUTH TWICE DAILY 180 Tablet 3    ELIQUIS 5 MG Tab TAKE 1 TABLET BY MOUTH TWICE DAILY 180 Tablet 3    potassium chloride SA (K-DUR) 10 MEQ Tab CR TAKE 2 TABLETS BY MOUTH EVERY  Tablet 3    furosemide (LASIX) 20 MG Tab TAKE 1 TABLET BY MOUTH EVERY DAY 90 Tablet 2    ALPRAZolam (XANAX) 0.25 MG Tab Take 1 Tablet by mouth at bedtime as needed.      valsartan (DIOVAN) 160 MG Tab Take 1 Tablet by mouth 2 times a day. 180 Tablet 2    naproxen (NAPROSYN) 500 MG Tab TAKE 1 TABLET BY MOUTH AS NEEDED(NO MORE THATN 2 TIMES PER WEEK) CAUTION WHEN USING WITH ELIQUIS 90 Tablet 0    meclizine (ANTIVERT) 12.5 MG Tab TAKE 1 TABLET BY MOUTH THREE TIMES DAILY AS NEEDED 30 tablet 1    Prenatal Multivit-Min-Fe-FA (PRE-SAMIRA PO) Take 1 Capsule by mouth 2 times a day.      fluticasone (FLONASE) 50 MCG/ACT nasal spray Administer 1 spray into each nostril twice daily for 2 weeks, then daily thereafter. (Patient taking differently: Administer 1 Spray into affected nostril(S) as needed.) 16 g 3    fluocinonide (LIDEX) 0.05 % external solution Apply 0.05 Applicators to affected area(s) every day.  3    Multiple Vitamins-Minerals (OPTISOURCE POST BARIATRIC SURG PO) Take 1 Tab by mouth 2 Times a Day.      cyanocobalamin (VITAMIN B-12) 500 MCG TABS Take 1 Tablet by mouth every day.      omeprazole (PRILOSEC) 20 MG CPDR Take 1 Capsule by mouth every 48 hours.      tramadol (ULTRAM) 50 MG TABS Take 1-2 Tablets by mouth every four hours as needed for Severe Pain.      ascorbic acid (ASCORBIC ACID) 500 MG TABS Take 1 Tablet by mouth 2 times a day.      Calcium Citrate (CITRACAL PO) Take 2 Tabs by mouth 2 Times a Day.      diazePAM (VALIUM) 5 MG Tab TAKE 1 TABLET BY MOUTH 30 MINUTES BEFORE MRI. REPEAT DOSE IN 30 MINUTES IF ONGOING SYMPTOMS. FOR NAUSEA (Patient not taking: Reported on 2022)       No  current facility-administered medications for this visit.         Physical Exam:   Gen:           Alert and oriented, No apparent distress. Mood and affect appropriate, normal interaction with examiner.  Eyes:          PERRL, EOM intact, sclere white, conjunctive moist.  Ears:          Not examined.   Hearing:     Grossly intact.  Nose:          Normal, no lesions or deformities.  Dentition:    Good dentition.  Oropharynx:   Tongue normal, posterior pharynx without erythema or exudate  Neck:        Supple, trachea midline, no masses.  Respiratory Effort: No intercostal retractions or use of accessory muscles.   Lung Auscultation:      Clear to auscultation bilaterally; no rales, rhonchi or wheezing.  CV:            Regular rate and rhythm. No murmurs, rubs or gallops.  Abd:           Not examined.   Lymphadenopathy: Not examined.  Gait and Station: Normal.  Digits and Nails: No clubbing, cyanosis, petechiae, or nodes.   Cranial Nerves: II-XII grossly intact.  Skin:        No rashes, lesions or ulcers noted.               Ext:           No cyanosis or edema.      Assessment:  1. JIGNA (obstructive sleep apnea)            Plan:  Patient is using ASV and does have improved AHI, he is considered compliant with his usage time, but his AHI is mildly elevated at 10.5 with evidence of mask leak.  I would like him to switch to a ResMed AirFit F 30 I and follow-up in 3 months with more compliance use.  Patient was also instructed on proper mask fit and how to disconnect tubing from mask if he had's to get up during the night.  We will reassess in 3 months.    Please note that this dictation was created using voice recognition software. I have made every reasonable attempt to correct obvious errors, but it is possible there are errors of grammar and possibly content that I did not discover before finalizing the note.

## 2022-11-16 ENCOUNTER — PRE-ADMISSION TESTING (OUTPATIENT)
Dept: ADMISSIONS | Facility: MEDICAL CENTER | Age: 72
End: 2022-11-16
Attending: NEUROLOGICAL SURGERY
Payer: MEDICARE

## 2022-11-16 DIAGNOSIS — Z01.810 PRE-OPERATIVE CARDIOVASCULAR EXAMINATION: ICD-10-CM

## 2022-11-16 DIAGNOSIS — Z01.812 PRE-OPERATIVE LABORATORY EXAMINATION: ICD-10-CM

## 2022-11-16 LAB
ABO GROUP BLD: NORMAL
ANION GAP SERPL CALC-SCNC: 12 MMOL/L (ref 7–16)
APTT PPP: 33.3 SEC (ref 24.7–36)
BASOPHILS # BLD AUTO: 0.8 % (ref 0–1.8)
BASOPHILS # BLD: 0.05 K/UL (ref 0–0.12)
BLD GP AB SCN SERPL QL: NORMAL
BUN SERPL-MCNC: 17 MG/DL (ref 8–22)
CALCIUM SERPL-MCNC: 9.6 MG/DL (ref 8.5–10.5)
CHLORIDE SERPL-SCNC: 102 MMOL/L (ref 96–112)
CO2 SERPL-SCNC: 27 MMOL/L (ref 20–33)
CREAT SERPL-MCNC: 1.13 MG/DL (ref 0.5–1.4)
EKG IMPRESSION: NORMAL
EOSINOPHIL # BLD AUTO: 0.17 K/UL (ref 0–0.51)
EOSINOPHIL NFR BLD: 2.7 % (ref 0–6.9)
ERYTHROCYTE [DISTWIDTH] IN BLOOD BY AUTOMATED COUNT: 46.9 FL (ref 35.9–50)
GFR SERPLBLD CREATININE-BSD FMLA CKD-EPI: 70 ML/MIN/1.73 M 2
GLUCOSE SERPL-MCNC: 107 MG/DL (ref 65–99)
HCT VFR BLD AUTO: 43.3 % (ref 42–52)
HGB BLD-MCNC: 13.9 G/DL (ref 14–18)
IMM GRANULOCYTES # BLD AUTO: 0.02 K/UL (ref 0–0.11)
IMM GRANULOCYTES NFR BLD AUTO: 0.3 % (ref 0–0.9)
INR PPP: 1.07 (ref 0.87–1.13)
LYMPHOCYTES # BLD AUTO: 1.78 K/UL (ref 1–4.8)
LYMPHOCYTES NFR BLD: 28.3 % (ref 22–41)
MCH RBC QN AUTO: 29.3 PG (ref 27–33)
MCHC RBC AUTO-ENTMCNC: 32.1 G/DL (ref 33.7–35.3)
MCV RBC AUTO: 91.2 FL (ref 81.4–97.8)
MONOCYTES # BLD AUTO: 0.72 K/UL (ref 0–0.85)
MONOCYTES NFR BLD AUTO: 11.4 % (ref 0–13.4)
NEUTROPHILS # BLD AUTO: 3.56 K/UL (ref 1.82–7.42)
NEUTROPHILS NFR BLD: 56.5 % (ref 44–72)
NRBC # BLD AUTO: 0 K/UL
NRBC BLD-RTO: 0 /100 WBC
PLATELET # BLD AUTO: 260 K/UL (ref 164–446)
PMV BLD AUTO: 10 FL (ref 9–12.9)
POTASSIUM SERPL-SCNC: 4.4 MMOL/L (ref 3.6–5.5)
PROTHROMBIN TIME: 13.8 SEC (ref 12–14.6)
RBC # BLD AUTO: 4.75 M/UL (ref 4.7–6.1)
RH BLD: NORMAL
SODIUM SERPL-SCNC: 141 MMOL/L (ref 135–145)
WBC # BLD AUTO: 6.3 K/UL (ref 4.8–10.8)

## 2022-11-16 PROCEDURE — 93010 ELECTROCARDIOGRAM REPORT: CPT | Performed by: STUDENT IN AN ORGANIZED HEALTH CARE EDUCATION/TRAINING PROGRAM

## 2022-11-16 PROCEDURE — 80048 BASIC METABOLIC PNL TOTAL CA: CPT

## 2022-11-16 PROCEDURE — 86901 BLOOD TYPING SEROLOGIC RH(D): CPT

## 2022-11-16 PROCEDURE — 93005 ELECTROCARDIOGRAM TRACING: CPT

## 2022-11-16 PROCEDURE — 86850 RBC ANTIBODY SCREEN: CPT

## 2022-11-16 PROCEDURE — 86900 BLOOD TYPING SEROLOGIC ABO: CPT

## 2022-11-16 PROCEDURE — 85025 COMPLETE CBC W/AUTO DIFF WBC: CPT

## 2022-11-16 PROCEDURE — 85610 PROTHROMBIN TIME: CPT

## 2022-11-16 PROCEDURE — 36415 COLL VENOUS BLD VENIPUNCTURE: CPT

## 2022-11-16 PROCEDURE — 85730 THROMBOPLASTIN TIME PARTIAL: CPT

## 2022-11-16 ASSESSMENT — FIBROSIS 4 INDEX: FIB4 SCORE: 1.53

## 2022-11-21 DIAGNOSIS — I51.89 DIASTOLIC DYSFUNCTION: ICD-10-CM

## 2022-11-21 DIAGNOSIS — I48.0 PAROXYSMAL ATRIAL FIBRILLATION (HCC): ICD-10-CM

## 2022-11-21 DIAGNOSIS — I11.9 BENIGN HYPERTENSIVE HEART DISEASE WITHOUT HEART FAILURE: ICD-10-CM

## 2022-11-21 NOTE — TELEPHONE ENCOUNTER
TW        Caller: Warren Templeton      Medication Name and Dosage: valsartan (DIOVAN) 160 MG Tab        Please call your pharmacy and have them send us a refill request or speak to a live representative, RX number may have changed.    Medication amount left: 4-5 days    Preferred Pharmacy: [id number] - [place], [place] - [address]    Other questions (Topic): n/a    Callback Number (Will only call for issues): [phone]      Thank you    -Maximus AVALOS

## 2022-11-22 ENCOUNTER — TELEPHONE (OUTPATIENT)
Dept: CARDIOLOGY | Facility: MEDICAL CENTER | Age: 72
End: 2022-11-22

## 2022-11-22 RX ORDER — VALSARTAN 160 MG/1
160 TABLET ORAL 2 TIMES DAILY
Qty: 180 TABLET | Refills: 3 | Status: SHIPPED | OUTPATIENT
Start: 2022-11-22 | End: 2023-10-02 | Stop reason: SDUPTHER

## 2022-11-23 NOTE — TELEPHONE ENCOUNTER
Is the patient due for a refill? Yes    Was the patient seen the past year? Yes    Date of last office visit: 10/12/22    Does the patient have an upcoming appointment?  No   If yes, When?     Provider to refill:tw    Does the patients insurance require a 100 day supply?  No

## 2022-11-29 ENCOUNTER — TELEPHONE (OUTPATIENT)
Dept: CARDIOLOGY | Facility: MEDICAL CENTER | Age: 72
End: 2022-11-29

## 2022-11-29 NOTE — TELEPHONE ENCOUNTER
WINIFRED    Caller: Felicita Kelley    Medication Name and Dosage:    valsartan (DIOVAN) 160 MG Tab [[id number]]    Medication amount left: 0    Preferred Pharmacy:   [facility]    Other questions (Topic): N/A    Callback Number (Will only call for issues): [phone]    Thank you,   Susana CHAN

## 2022-12-01 NOTE — TELEPHONE ENCOUNTER
TW    Caller: Warren Templeton    Medication Name and Dosage:  valsartan (DIOVAN) 160 MG Tab [[id number]]     Order Details       Please call your pharmacy and have them send us a refill request or speak to a live representative, RX number may have changed.       Medication amount left: 7 days     Preferred Pharmacy: [facility] on file     Other questions (Topic): PT states Pharmacy has not received     Callback Number (Will only call for issues): [phone] (home)     Thank You  -Elizabeth WHITESIDE

## 2022-12-05 ENCOUNTER — APPOINTMENT (OUTPATIENT)
Dept: RADIOLOGY | Facility: MEDICAL CENTER | Age: 72
End: 2022-12-05
Attending: NEUROLOGICAL SURGERY
Payer: MEDICARE

## 2022-12-05 ENCOUNTER — HOSPITAL ENCOUNTER (OUTPATIENT)
Facility: MEDICAL CENTER | Age: 72
End: 2022-12-08
Attending: NEUROLOGICAL SURGERY | Admitting: NEUROLOGICAL SURGERY
Payer: MEDICARE

## 2022-12-05 ENCOUNTER — ANESTHESIA (OUTPATIENT)
Dept: SURGERY | Facility: MEDICAL CENTER | Age: 72
End: 2022-12-05
Payer: MEDICARE

## 2022-12-05 ENCOUNTER — ANESTHESIA EVENT (OUTPATIENT)
Dept: SURGERY | Facility: MEDICAL CENTER | Age: 72
End: 2022-12-05
Payer: MEDICARE

## 2022-12-05 DIAGNOSIS — M48.062 LUMBAR STENOSIS WITH NEUROGENIC CLAUDICATION: ICD-10-CM

## 2022-12-05 DIAGNOSIS — M48.062 SPINAL STENOSIS OF LUMBAR REGION WITH NEUROGENIC CLAUDICATION: ICD-10-CM

## 2022-12-05 DIAGNOSIS — G89.18 POST-OPERATIVE PAIN: ICD-10-CM

## 2022-12-05 LAB
ABO + RH BLD: NORMAL
INR PPP: 1.11 (ref 0.87–1.13)
PROTHROMBIN TIME: 14.2 SEC (ref 12–14.6)

## 2022-12-05 PROCEDURE — 96375 TX/PRO/DX INJ NEW DRUG ADDON: CPT | Mod: XU

## 2022-12-05 PROCEDURE — 700111 HCHG RX REV CODE 636 W/ 250 OVERRIDE (IP): Performed by: NEUROLOGICAL SURGERY

## 2022-12-05 PROCEDURE — 160031 HCHG SURGERY MINUTES - 1ST 30 MINS LEVEL 5: Performed by: NEUROLOGICAL SURGERY

## 2022-12-05 PROCEDURE — 00670 ANES XTNSV SP&SPI CORD PX: CPT | Performed by: ANESTHESIOLOGY

## 2022-12-05 PROCEDURE — 700102 HCHG RX REV CODE 250 W/ 637 OVERRIDE(OP): Performed by: ANESTHESIOLOGY

## 2022-12-05 PROCEDURE — 99100 ANES PT EXTEME AGE<1 YR&>70: CPT | Performed by: ANESTHESIOLOGY

## 2022-12-05 PROCEDURE — 160002 HCHG RECOVERY MINUTES (STAT): Performed by: NEUROLOGICAL SURGERY

## 2022-12-05 PROCEDURE — 700101 HCHG RX REV CODE 250: Performed by: NEUROLOGICAL SURGERY

## 2022-12-05 PROCEDURE — 160035 HCHG PACU - 1ST 60 MINS PHASE I: Performed by: NEUROLOGICAL SURGERY

## 2022-12-05 PROCEDURE — 72100 X-RAY EXAM L-S SPINE 2/3 VWS: CPT

## 2022-12-05 PROCEDURE — 700105 HCHG RX REV CODE 258: Performed by: ANESTHESIOLOGY

## 2022-12-05 PROCEDURE — C1713 ANCHOR/SCREW BN/BN,TIS/BN: HCPCS | Performed by: NEUROLOGICAL SURGERY

## 2022-12-05 PROCEDURE — 95870 NDL EMG LMTD STD MUSC 1 XTR: CPT | Mod: XU | Performed by: NEUROLOGICAL SURGERY

## 2022-12-05 PROCEDURE — 110454 HCHG SHELL REV 250: Performed by: NEUROLOGICAL SURGERY

## 2022-12-05 PROCEDURE — 160036 HCHG PACU - EA ADDL 30 MINS PHASE I: Performed by: NEUROLOGICAL SURGERY

## 2022-12-05 PROCEDURE — A9270 NON-COVERED ITEM OR SERVICE: HCPCS | Performed by: ANESTHESIOLOGY

## 2022-12-05 PROCEDURE — G0378 HOSPITAL OBSERVATION PER HR: HCPCS

## 2022-12-05 PROCEDURE — 700105 HCHG RX REV CODE 258: Performed by: NEUROLOGICAL SURGERY

## 2022-12-05 PROCEDURE — 95937 NEUROMUSCULAR JUNCTION TEST: CPT | Mod: XU | Performed by: NEUROLOGICAL SURGERY

## 2022-12-05 PROCEDURE — 95861 NEEDLE EMG 2 EXTREMITIES: CPT | Mod: XU | Performed by: NEUROLOGICAL SURGERY

## 2022-12-05 PROCEDURE — 96365 THER/PROPH/DIAG IV INF INIT: CPT | Mod: XU

## 2022-12-05 PROCEDURE — 160042 HCHG SURGERY MINUTES - EA ADDL 1 MIN LEVEL 5: Performed by: NEUROLOGICAL SURGERY

## 2022-12-05 PROCEDURE — 110371 HCHG SHELL REV 272: Performed by: NEUROLOGICAL SURGERY

## 2022-12-05 PROCEDURE — 700101 HCHG RX REV CODE 250: Performed by: ANESTHESIOLOGY

## 2022-12-05 PROCEDURE — 502000 HCHG MISC OR IMPLANTS RC 0278: Performed by: NEUROLOGICAL SURGERY

## 2022-12-05 PROCEDURE — 160009 HCHG ANES TIME/MIN: Performed by: NEUROLOGICAL SURGERY

## 2022-12-05 PROCEDURE — 85610 PROTHROMBIN TIME: CPT

## 2022-12-05 PROCEDURE — 95938 SOMATOSENSORY TESTING: CPT | Mod: XU | Performed by: NEUROLOGICAL SURGERY

## 2022-12-05 PROCEDURE — 95940 IONM IN OPERATNG ROOM 15 MIN: CPT | Mod: XU | Performed by: NEUROLOGICAL SURGERY

## 2022-12-05 PROCEDURE — 700102 HCHG RX REV CODE 250 W/ 637 OVERRIDE(OP): Performed by: NEUROLOGICAL SURGERY

## 2022-12-05 PROCEDURE — A9270 NON-COVERED ITEM OR SERVICE: HCPCS | Performed by: NEUROLOGICAL SURGERY

## 2022-12-05 PROCEDURE — 36415 COLL VENOUS BLD VENIPUNCTURE: CPT

## 2022-12-05 PROCEDURE — 700111 HCHG RX REV CODE 636 W/ 250 OVERRIDE (IP): Performed by: ANESTHESIOLOGY

## 2022-12-05 PROCEDURE — 160048 HCHG OR STATISTICAL LEVEL 1-5: Performed by: NEUROLOGICAL SURGERY

## 2022-12-05 DEVICE — SCREW MAS  SOLERA 6.5 X 50MM (1TCX16+3TCX8=40): Type: IMPLANTABLE DEVICE | Site: BACK | Status: FUNCTIONAL

## 2022-12-05 DEVICE — SCREW SOLERA SET SCREW (1TCX40+3TCX21+2TCX10=123): Type: IMPLANTABLE DEVICE | Site: BACK | Status: FUNCTIONAL

## 2022-12-05 DEVICE — ROD PREBENT TITANIUM 5.5 X 100MM (2TCONX2=4): Type: IMPLANTABLE DEVICE | Site: BACK | Status: FUNCTIONAL

## 2022-12-05 DEVICE — IMPLANTABLE DEVICE: Type: IMPLANTABLE DEVICE | Site: BACK | Status: FUNCTIONAL

## 2022-12-05 DEVICE — GRAPH BONE KIT INFUSE MEDIUM: Type: IMPLANTABLE DEVICE | Site: BACK | Status: FUNCTIONAL

## 2022-12-05 DEVICE — GRAFT BONE MAGNIFUSE 1X10CM: Type: IMPLANTABLE DEVICE | Site: BACK | Status: FUNCTIONAL

## 2022-12-05 DEVICE — SCREW MAS  SOLERA 5.5 X 50MM (1TCX8+3TCX8=32): Type: IMPLANTABLE DEVICE | Site: BACK | Status: FUNCTIONAL

## 2022-12-05 RX ORDER — CEFAZOLIN SODIUM 1 G/3ML
INJECTION, POWDER, FOR SOLUTION INTRAMUSCULAR; INTRAVENOUS PRN
Status: DISCONTINUED | OUTPATIENT
Start: 2022-12-05 | End: 2022-12-05 | Stop reason: SURG

## 2022-12-05 RX ORDER — BISACODYL 10 MG
10 SUPPOSITORY, RECTAL RECTAL
Status: DISCONTINUED | OUTPATIENT
Start: 2022-12-05 | End: 2022-12-08 | Stop reason: HOSPADM

## 2022-12-05 RX ORDER — SODIUM CHLORIDE, SODIUM GLUCONATE, SODIUM ACETATE, POTASSIUM CHLORIDE AND MAGNESIUM CHLORIDE 526; 502; 368; 37; 30 MG/100ML; MG/100ML; MG/100ML; MG/100ML; MG/100ML
INJECTION, SOLUTION INTRAVENOUS
Status: DISCONTINUED | OUTPATIENT
Start: 2022-12-05 | End: 2022-12-05

## 2022-12-05 RX ORDER — MEPERIDINE HYDROCHLORIDE 25 MG/ML
12.5 INJECTION INTRAMUSCULAR; INTRAVENOUS; SUBCUTANEOUS
Status: DISCONTINUED | OUTPATIENT
Start: 2022-12-05 | End: 2022-12-05 | Stop reason: HOSPADM

## 2022-12-05 RX ORDER — ENEMA 19; 7 G/133ML; G/133ML
1 ENEMA RECTAL
Status: DISCONTINUED | OUTPATIENT
Start: 2022-12-05 | End: 2022-12-08 | Stop reason: HOSPADM

## 2022-12-05 RX ORDER — HYDRALAZINE HYDROCHLORIDE 20 MG/ML
5 INJECTION INTRAMUSCULAR; INTRAVENOUS
Status: DISCONTINUED | OUTPATIENT
Start: 2022-12-05 | End: 2022-12-05 | Stop reason: HOSPADM

## 2022-12-05 RX ORDER — HYDRALAZINE HYDROCHLORIDE 20 MG/ML
10 INJECTION INTRAMUSCULAR; INTRAVENOUS
Status: DISCONTINUED | OUTPATIENT
Start: 2022-12-05 | End: 2022-12-08 | Stop reason: HOSPADM

## 2022-12-05 RX ORDER — AMOXICILLIN 250 MG
1 CAPSULE ORAL NIGHTLY
Status: DISCONTINUED | OUTPATIENT
Start: 2022-12-05 | End: 2022-12-08 | Stop reason: HOSPADM

## 2022-12-05 RX ORDER — HYDROMORPHONE HYDROCHLORIDE 1 MG/ML
0.2 INJECTION, SOLUTION INTRAMUSCULAR; INTRAVENOUS; SUBCUTANEOUS
Status: DISCONTINUED | OUTPATIENT
Start: 2022-12-05 | End: 2022-12-05 | Stop reason: HOSPADM

## 2022-12-05 RX ORDER — BUPIVACAINE HYDROCHLORIDE AND EPINEPHRINE 5; 5 MG/ML; UG/ML
INJECTION, SOLUTION EPIDURAL; INTRACAUDAL; PERINEURAL
Status: DISCONTINUED | OUTPATIENT
Start: 2022-12-05 | End: 2022-12-05 | Stop reason: HOSPADM

## 2022-12-05 RX ORDER — DIPHENHYDRAMINE HCL 25 MG
25 TABLET ORAL EVERY 6 HOURS PRN
Status: DISCONTINUED | OUTPATIENT
Start: 2022-12-05 | End: 2022-12-08 | Stop reason: HOSPADM

## 2022-12-05 RX ORDER — MIDAZOLAM HYDROCHLORIDE 1 MG/ML
INJECTION INTRAMUSCULAR; INTRAVENOUS PRN
Status: DISCONTINUED | OUTPATIENT
Start: 2022-12-05 | End: 2022-12-05 | Stop reason: SURG

## 2022-12-05 RX ORDER — SODIUM CHLORIDE, SODIUM LACTATE, POTASSIUM CHLORIDE, CALCIUM CHLORIDE 600; 310; 30; 20 MG/100ML; MG/100ML; MG/100ML; MG/100ML
INJECTION, SOLUTION INTRAVENOUS CONTINUOUS
Status: DISCONTINUED | OUTPATIENT
Start: 2022-12-05 | End: 2022-12-05 | Stop reason: HOSPADM

## 2022-12-05 RX ORDER — DEXAMETHASONE SODIUM PHOSPHATE 4 MG/ML
INJECTION, SOLUTION INTRA-ARTICULAR; INTRALESIONAL; INTRAMUSCULAR; INTRAVENOUS; SOFT TISSUE PRN
Status: DISCONTINUED | OUTPATIENT
Start: 2022-12-05 | End: 2022-12-05 | Stop reason: SURG

## 2022-12-05 RX ORDER — METOPROLOL SUCCINATE 50 MG/1
50 TABLET, EXTENDED RELEASE ORAL 2 TIMES DAILY
Status: DISCONTINUED | OUTPATIENT
Start: 2022-12-05 | End: 2022-12-08 | Stop reason: HOSPADM

## 2022-12-05 RX ORDER — SODIUM CHLORIDE, SODIUM LACTATE, POTASSIUM CHLORIDE, CALCIUM CHLORIDE 600; 310; 30; 20 MG/100ML; MG/100ML; MG/100ML; MG/100ML
INJECTION, SOLUTION INTRAVENOUS
Status: DISCONTINUED | OUTPATIENT
Start: 2022-12-05 | End: 2022-12-05 | Stop reason: SURG

## 2022-12-05 RX ORDER — AMLODIPINE BESYLATE 10 MG/1
10 TABLET ORAL DAILY
Status: DISCONTINUED | OUTPATIENT
Start: 2022-12-06 | End: 2022-12-08 | Stop reason: HOSPADM

## 2022-12-05 RX ORDER — OXYCODONE HCL 5 MG/5 ML
10 SOLUTION, ORAL ORAL
Status: COMPLETED | OUTPATIENT
Start: 2022-12-05 | End: 2022-12-05

## 2022-12-05 RX ORDER — OXYCODONE HYDROCHLORIDE 5 MG/1
5 TABLET ORAL ONCE
Status: COMPLETED | OUTPATIENT
Start: 2022-12-05 | End: 2022-12-05

## 2022-12-05 RX ORDER — ACETAMINOPHEN 500 MG
1000 TABLET ORAL ONCE
Status: COMPLETED | OUTPATIENT
Start: 2022-12-05 | End: 2022-12-05

## 2022-12-05 RX ORDER — ONDANSETRON 2 MG/ML
4 INJECTION INTRAMUSCULAR; INTRAVENOUS
Status: DISCONTINUED | OUTPATIENT
Start: 2022-12-05 | End: 2022-12-05 | Stop reason: HOSPADM

## 2022-12-05 RX ORDER — HYDROMORPHONE HYDROCHLORIDE 1 MG/ML
0.5 INJECTION, SOLUTION INTRAMUSCULAR; INTRAVENOUS; SUBCUTANEOUS
Status: DISCONTINUED | OUTPATIENT
Start: 2022-12-05 | End: 2022-12-05 | Stop reason: HOSPADM

## 2022-12-05 RX ORDER — ACETAMINOPHEN 500 MG
1000 TABLET ORAL EVERY 6 HOURS
Status: DISCONTINUED | OUTPATIENT
Start: 2022-12-05 | End: 2022-12-08 | Stop reason: HOSPADM

## 2022-12-05 RX ORDER — CEFAZOLIN SODIUM 1 G/3ML
INJECTION, POWDER, FOR SOLUTION INTRAMUSCULAR; INTRAVENOUS
Status: DISCONTINUED | OUTPATIENT
Start: 2022-12-05 | End: 2022-12-05 | Stop reason: HOSPADM

## 2022-12-05 RX ORDER — ONDANSETRON 2 MG/ML
INJECTION INTRAMUSCULAR; INTRAVENOUS PRN
Status: DISCONTINUED | OUTPATIENT
Start: 2022-12-05 | End: 2022-12-05 | Stop reason: SURG

## 2022-12-05 RX ORDER — AMOXICILLIN 250 MG
1 CAPSULE ORAL
Status: DISCONTINUED | OUTPATIENT
Start: 2022-12-05 | End: 2022-12-08 | Stop reason: HOSPADM

## 2022-12-05 RX ORDER — DIPHENHYDRAMINE HYDROCHLORIDE 50 MG/ML
12.5 INJECTION INTRAMUSCULAR; INTRAVENOUS
Status: DISCONTINUED | OUTPATIENT
Start: 2022-12-05 | End: 2022-12-05 | Stop reason: HOSPADM

## 2022-12-05 RX ORDER — BACLOFEN 10 MG/1
10 TABLET ORAL 3 TIMES DAILY
Status: DISCONTINUED | OUTPATIENT
Start: 2022-12-05 | End: 2022-12-08 | Stop reason: HOSPADM

## 2022-12-05 RX ORDER — ALPRAZOLAM 0.25 MG/1
0.25 TABLET ORAL 2 TIMES DAILY PRN
Status: DISCONTINUED | OUTPATIENT
Start: 2022-12-05 | End: 2022-12-08 | Stop reason: HOSPADM

## 2022-12-05 RX ORDER — OXYCODONE HCL 5 MG/5 ML
5 SOLUTION, ORAL ORAL
Status: COMPLETED | OUTPATIENT
Start: 2022-12-05 | End: 2022-12-05

## 2022-12-05 RX ORDER — DIPHENHYDRAMINE HYDROCHLORIDE 50 MG/ML
25 INJECTION INTRAMUSCULAR; INTRAVENOUS EVERY 6 HOURS PRN
Status: DISCONTINUED | OUTPATIENT
Start: 2022-12-05 | End: 2022-12-08 | Stop reason: HOSPADM

## 2022-12-05 RX ORDER — MECLIZINE HCL 12.5 MG/1
12.5 TABLET ORAL 3 TIMES DAILY PRN
Status: DISCONTINUED | OUTPATIENT
Start: 2022-12-05 | End: 2022-12-08 | Stop reason: HOSPADM

## 2022-12-05 RX ORDER — DEXMEDETOMIDINE HYDROCHLORIDE 100 UG/ML
INJECTION, SOLUTION INTRAVENOUS PRN
Status: DISCONTINUED | OUTPATIENT
Start: 2022-12-05 | End: 2022-12-05 | Stop reason: SURG

## 2022-12-05 RX ORDER — LIDOCAINE HYDROCHLORIDE 20 MG/ML
INJECTION, SOLUTION EPIDURAL; INFILTRATION; INTRACAUDAL; PERINEURAL PRN
Status: DISCONTINUED | OUTPATIENT
Start: 2022-12-05 | End: 2022-12-05 | Stop reason: SURG

## 2022-12-05 RX ORDER — FUROSEMIDE 20 MG/1
20 TABLET ORAL DAILY
Status: DISCONTINUED | OUTPATIENT
Start: 2022-12-05 | End: 2022-12-08 | Stop reason: HOSPADM

## 2022-12-05 RX ORDER — SODIUM CHLORIDE, SODIUM LACTATE, POTASSIUM CHLORIDE, CALCIUM CHLORIDE 600; 310; 30; 20 MG/100ML; MG/100ML; MG/100ML; MG/100ML
INJECTION, SOLUTION INTRAVENOUS CONTINUOUS
Status: ACTIVE | OUTPATIENT
Start: 2022-12-05 | End: 2022-12-05

## 2022-12-05 RX ORDER — CALCIUM CARBONATE 500 MG/1
500 TABLET, CHEWABLE ORAL 2 TIMES DAILY
Status: DISCONTINUED | OUTPATIENT
Start: 2022-12-05 | End: 2022-12-08 | Stop reason: HOSPADM

## 2022-12-05 RX ORDER — ALPRAZOLAM 0.25 MG/1
0.25 TABLET ORAL NIGHTLY PRN
Status: ON HOLD | COMMUNITY
End: 2022-12-08

## 2022-12-05 RX ORDER — VALSARTAN 80 MG/1
160 TABLET ORAL 2 TIMES DAILY
Status: DISCONTINUED | OUTPATIENT
Start: 2022-12-05 | End: 2022-12-08 | Stop reason: HOSPADM

## 2022-12-05 RX ORDER — POLYETHYLENE GLYCOL 3350 17 G/17G
1 POWDER, FOR SOLUTION ORAL 2 TIMES DAILY PRN
Status: DISCONTINUED | OUTPATIENT
Start: 2022-12-05 | End: 2022-12-08 | Stop reason: HOSPADM

## 2022-12-05 RX ORDER — HYDROMORPHONE HYDROCHLORIDE 2 MG/ML
INJECTION, SOLUTION INTRAMUSCULAR; INTRAVENOUS; SUBCUTANEOUS PRN
Status: DISCONTINUED | OUTPATIENT
Start: 2022-12-05 | End: 2022-12-05 | Stop reason: SURG

## 2022-12-05 RX ORDER — HYDROMORPHONE HYDROCHLORIDE 1 MG/ML
0.4 INJECTION, SOLUTION INTRAMUSCULAR; INTRAVENOUS; SUBCUTANEOUS
Status: DISCONTINUED | OUTPATIENT
Start: 2022-12-05 | End: 2022-12-05 | Stop reason: HOSPADM

## 2022-12-05 RX ORDER — HALOPERIDOL 5 MG/ML
1 INJECTION INTRAMUSCULAR
Status: DISCONTINUED | OUTPATIENT
Start: 2022-12-05 | End: 2022-12-05 | Stop reason: HOSPADM

## 2022-12-05 RX ORDER — ROCURONIUM BROMIDE 10 MG/ML
INJECTION, SOLUTION INTRAVENOUS PRN
Status: DISCONTINUED | OUTPATIENT
Start: 2022-12-05 | End: 2022-12-05 | Stop reason: SURG

## 2022-12-05 RX ORDER — ACETAMINOPHEN 500 MG
1000 TABLET ORAL EVERY 6 HOURS PRN
Status: DISCONTINUED | OUTPATIENT
Start: 2022-12-10 | End: 2022-12-08 | Stop reason: HOSPADM

## 2022-12-05 RX ORDER — DOCUSATE SODIUM 100 MG/1
100 CAPSULE, LIQUID FILLED ORAL 2 TIMES DAILY
Status: DISCONTINUED | OUTPATIENT
Start: 2022-12-05 | End: 2022-12-08 | Stop reason: HOSPADM

## 2022-12-05 RX ADMIN — CEFAZOLIN 3 G: 330 INJECTION, POWDER, FOR SOLUTION INTRAMUSCULAR; INTRAVENOUS at 12:57

## 2022-12-05 RX ADMIN — BACLOFEN 10 MG: 10 TABLET ORAL at 18:33

## 2022-12-05 RX ADMIN — MIDAZOLAM HYDROCHLORIDE 2 MG: 1 INJECTION, SOLUTION INTRAMUSCULAR; INTRAVENOUS at 08:58

## 2022-12-05 RX ADMIN — Medication: at 17:43

## 2022-12-05 RX ADMIN — OXYCODONE HYDROCHLORIDE 10 MG: 5 SOLUTION ORAL at 15:03

## 2022-12-05 RX ADMIN — DEXMEDETOMIDINE 10 MCG: 200 INJECTION, SOLUTION INTRAVENOUS at 12:45

## 2022-12-05 RX ADMIN — HYDROMORPHONE HYDROCHLORIDE 0.5 MG: 2 INJECTION INTRAMUSCULAR; INTRAVENOUS; SUBCUTANEOUS at 12:17

## 2022-12-05 RX ADMIN — SODIUM CHLORIDE, POTASSIUM CHLORIDE, SODIUM LACTATE AND CALCIUM CHLORIDE: 600; 310; 30; 20 INJECTION, SOLUTION INTRAVENOUS at 08:58

## 2022-12-05 RX ADMIN — ACETAMINOPHEN 1000 MG: 500 TABLET ORAL at 17:27

## 2022-12-05 RX ADMIN — FUROSEMIDE 20 MG: 20 TABLET ORAL at 17:28

## 2022-12-05 RX ADMIN — HYDROMORPHONE HYDROCHLORIDE 0.5 MG: 2 INJECTION INTRAMUSCULAR; INTRAVENOUS; SUBCUTANEOUS at 09:01

## 2022-12-05 RX ADMIN — DEXMEDETOMIDINE 10 MCG: 200 INJECTION, SOLUTION INTRAVENOUS at 11:59

## 2022-12-05 RX ADMIN — ONDANSETRON 4 MG: 2 INJECTION INTRAMUSCULAR; INTRAVENOUS at 13:50

## 2022-12-05 RX ADMIN — DEXMEDETOMIDINE 70 MCG: 200 INJECTION, SOLUTION INTRAVENOUS at 09:02

## 2022-12-05 RX ADMIN — BACLOFEN 10 MG: 10 TABLET ORAL at 21:01

## 2022-12-05 RX ADMIN — ROCURONIUM BROMIDE 100 MG: 10 INJECTION, SOLUTION INTRAVENOUS at 09:02

## 2022-12-05 RX ADMIN — OXYCODONE 5 MG: 5 TABLET ORAL at 08:01

## 2022-12-05 RX ADMIN — ROCURONIUM BROMIDE 30 MG: 10 INJECTION, SOLUTION INTRAVENOUS at 10:41

## 2022-12-05 RX ADMIN — CALCIUM CARBONATE 500 MG: 500 TABLET, CHEWABLE ORAL at 18:32

## 2022-12-05 RX ADMIN — LIDOCAINE HYDROCHLORIDE 100 MG: 20 INJECTION, SOLUTION EPIDURAL; INFILTRATION; INTRACAUDAL at 09:02

## 2022-12-05 RX ADMIN — ACETAMINOPHEN 1000 MG: 500 TABLET ORAL at 08:01

## 2022-12-05 RX ADMIN — DOCUSATE SODIUM 100 MG: 100 CAPSULE, LIQUID FILLED ORAL at 17:28

## 2022-12-05 RX ADMIN — CEFAZOLIN 2 G: 2 INJECTION, POWDER, FOR SOLUTION INTRAMUSCULAR; INTRAVENOUS at 21:18

## 2022-12-05 RX ADMIN — SODIUM CHLORIDE, POTASSIUM CHLORIDE, SODIUM LACTATE AND CALCIUM CHLORIDE: 600; 310; 30; 20 INJECTION, SOLUTION INTRAVENOUS at 13:37

## 2022-12-05 RX ADMIN — CEFAZOLIN 3 G: 330 INJECTION, POWDER, FOR SOLUTION INTRAMUSCULAR; INTRAVENOUS at 09:15

## 2022-12-05 RX ADMIN — DOCUSATE SODIUM 50 MG AND SENNOSIDES 8.6 MG 1 TABLET: 8.6; 5 TABLET, FILM COATED ORAL at 21:00

## 2022-12-05 RX ADMIN — PHENYLEPHRINE HYDROCHLORIDE 50 MCG/MIN: 10 INJECTION INTRAVENOUS at 09:06

## 2022-12-05 RX ADMIN — DEXMEDETOMIDINE 10 MCG: 200 INJECTION, SOLUTION INTRAVENOUS at 13:51

## 2022-12-05 RX ADMIN — EPHEDRINE SULFATE 10 MG: 50 INJECTION, SOLUTION INTRAVENOUS at 14:03

## 2022-12-05 RX ADMIN — PROPOFOL 200 MG: 10 INJECTION, EMULSION INTRAVENOUS at 09:02

## 2022-12-05 RX ADMIN — DEXAMETHASONE SODIUM PHOSPHATE 10 MG: 4 INJECTION, SOLUTION INTRA-ARTICULAR; INTRALESIONAL; INTRAMUSCULAR; INTRAVENOUS; SOFT TISSUE at 09:08

## 2022-12-05 RX ADMIN — SUGAMMADEX 200 MG: 100 INJECTION, SOLUTION INTRAVENOUS at 11:26

## 2022-12-05 RX ADMIN — SODIUM CHLORIDE, POTASSIUM CHLORIDE, SODIUM LACTATE AND CALCIUM CHLORIDE: 600; 310; 30; 20 INJECTION, SOLUTION INTRAVENOUS at 11:05

## 2022-12-05 RX ADMIN — SODIUM CHLORIDE, POTASSIUM CHLORIDE, SODIUM LACTATE AND CALCIUM CHLORIDE: 600; 310; 30; 20 INJECTION, SOLUTION INTRAVENOUS at 08:01

## 2022-12-05 ASSESSMENT — PATIENT HEALTH QUESTIONNAIRE - PHQ9
1. LITTLE INTEREST OR PLEASURE IN DOING THINGS: NOT AT ALL
2. FEELING DOWN, DEPRESSED, IRRITABLE, OR HOPELESS: NOT AT ALL
SUM OF ALL RESPONSES TO PHQ9 QUESTIONS 1 AND 2: 0

## 2022-12-05 ASSESSMENT — LIFESTYLE VARIABLES
TOTAL SCORE: 0
HOW MANY TIMES IN THE PAST YEAR HAVE YOU HAD 5 OR MORE DRINKS IN A DAY: 0
EVER FELT BAD OR GUILTY ABOUT YOUR DRINKING: NO
TOTAL SCORE: 0
HAVE PEOPLE ANNOYED YOU BY CRITICIZING YOUR DRINKING: NO
DOES PATIENT WANT TO STOP DRINKING: NO
TOTAL SCORE: 0
ALCOHOL_USE: YES
CONSUMPTION TOTAL: NEGATIVE
ON A TYPICAL DAY WHEN YOU DRINK ALCOHOL HOW MANY DRINKS DO YOU HAVE: 0
AVERAGE NUMBER OF DAYS PER WEEK YOU HAVE A DRINK CONTAINING ALCOHOL: 0
EVER HAD A DRINK FIRST THING IN THE MORNING TO STEADY YOUR NERVES TO GET RID OF A HANGOVER: NO
HAVE YOU EVER FELT YOU SHOULD CUT DOWN ON YOUR DRINKING: NO

## 2022-12-05 ASSESSMENT — PAIN SCALES - GENERAL: PAIN_LEVEL: 5

## 2022-12-05 ASSESSMENT — PAIN DESCRIPTION - PAIN TYPE
TYPE: ACUTE PAIN
TYPE: SURGICAL PAIN

## 2022-12-05 ASSESSMENT — FIBROSIS 4 INDEX
FIB4 SCORE: 1.346153846153846154
FIB4 SCORE: 1.346153846153846154

## 2022-12-05 NOTE — ANESTHESIA PREPROCEDURE EVALUATION
Case: [id number] Date/Time: 12/05/22 0815    Procedures:       POSTERIOR L1-4 DISCECTOMY INTERBODY GRAFT, BILATERAL PEDICLE SCREWS L1-4 WITH OARM      LAMINECTOMY, SPINE, LUMBAR, WITH DISCECTOMY (Back)    Anesthesia type: General    Pre-op diagnosis: DEGENERATION OF LUMBAR INTERVERTEBRAL DISC    [facility]    Surgeons: Johnny Acuna M.D.        Hx of afib s/p ablation (off Eliquis for one week). JIGNA w/ CPAP. HTN. Obesity (BMI 37).  Denies: MI/CHF/smoking/CVA/DM/CKD      Relevant Problems   ANESTHESIA   (positive) Sleep apnea      NEURO   (positive) Chronic tension-type headache, not intractable      CARDIAC   (positive) Aneurysm of ascending aorta without rupture   (positive) Atrial fibrillation (HCC)   (positive) MR (mitral regurgitation)      GI   (positive) Gastroesophageal reflux disease without esophagitis       Physical Exam    Airway   Mallampati: II  TM distance: >3 FB  Neck ROM: full       Cardiovascular - normal exam  Rhythm: regular  Rate: normal  (-) murmur     Dental - normal exam           Pulmonary - normal exam  Breath sounds clear to auscultation     Abdominal    Neurological - normal exam                 Anesthesia Plan    ASA 2       Plan - general       Airway plan will be ETT          Induction: intravenous    Postoperative Plan: Postoperative administration of opioids is intended.    Pertinent diagnostic labs and testing reviewed    Informed Consent:    Anesthetic plan and risks discussed with patient.    Use of blood products discussed with: patient whom consented to blood products.

## 2022-12-05 NOTE — PROGRESS NOTES
Med Rec Complete per patient   Allergies Reviewed with patient  No antibiotics within the last 30 days  Patient's Preferred Pharmacy:  [facility] in [place], [place]      Patient takes Eliquis 5mg, twice daily.

## 2022-12-05 NOTE — OP REPORT
NEUROSURGERY OPERATIVE NOTE  DATE:  2:29 PM 2022    PATIENT NAME:  Warren Templeton   1950 MRN [id number]      PROCEDURE:  1.  Lumbar 1, 2, 3, 4 laminectomy  2.  right Lumbar 1/2, 2/3, 3/4, 4/5 foraminotomy  3.  Left facetectomy lumbar 1/2, 2/3, 3/4 with pars resection  4.  Bilateral pedicle screw placement lumbar 1, 2, 3, 4 (Solaris, Medtronic)  5.  Diskectomy, interbody graft placement lumbar 1/2, 2/3, 3/4 (Catalyft, Medtroniic)  6.  Bone autograft obtained from same incision, bone allograft (Magnifuse, Medtronic), BMP placement  7.  Stealth intraoperative navigation  8.  Neurophysiologic monitoring  9.  Interbody, intertransverse process fusion with bone graft      Surgeon:  Johnny Acuna MD, PhD  Assistant: KATELIN Bateman.  Utilization assistant was critical for the performance this procedure.  To provide tissue retraction irrigation and suction on clear visualization of underlying neurologic structures.    Anesthesia:  GETA    Diagnosis:  Lumbar stenosis, neurogenic claudication, lumbar spine low listhesis    Indication: 72-year-old male with progressive low back pain rating down his legs.  He also has leg and foot numbness.  He is using a walker due to the symptoms.  He has failed conservative management including extensive physical therapy.  MRI demonstrates severe central stenosis at lumbar 1/2, listhesis at lumbar 3/4 with associated severe stenosis.  Diffuse foraminal stenosis is present.  As he is failed conservative management, and the symptoms are interfering with his activities of daily living, decompression and stabilization is planned.    Procedure:  The patient was identified in the holding area, and the surgery site marked, consent was obtained.  The patinet was brought back to the operating room and intubated by anesthesia service.  Neurophysiologic monitoring leads were placed by the neurophysiology technician.  Good baseline signals were obtained.  3 grams Ancef was  administered intravenously.  He was transferred to the OSI operating room table in a prone manner and all pressure points well padded.  Their lumbar region was prepped with  chlorhexidine and betadine scrub, and Chloroprep.  Sterile drapes were applied including a layer of Ioban.  The correct vertebral levels were identified flouroscopically. The planned midline incision was infiltrated with 0.5% Marcaine/epinephrine.      The skin was incised sharply and dissection carried deeply using monopolar cautery.  The lumbar 1-5 lamina were exposed; this was verified flouroscopically.  Stealth registration frame was secured to the lumbar 5 spinous process, and his lumbar vertebral column registered to the Avacen intraoperative navigation system using the O-arm.  Good registration was verified.  Bilateral pedicle screws were placed in the lumbar 1, 2, 3 levels.  The entry point was difficult to ascertain using standard anatomic landmarks given exuberant facet hypertrophy.  The entry point was localized using Stealth.  The facet hypertrophy was removed using the high-speed drill for with a AMA drill bit.  Dissection was attached to a suction trap during bone removal, the bone was then utilized during fusion later in the case.  The Pedicles Were Tapped Using a Navigated Tap, Bony Garza Verified with a Blunt Tip Probe, and Pedicle Screws Placed (Solera's, Medtronic).  The screws were stimulated.  The lowest threshold was 12 mA at the left elbow 4 level.  AP and lateral fluoroscopy demonstrated good screw position.    Troughs were created along the lateral aspect of the lumbar 1, 2, 3 lamina using the high speed drill with the M8 bit.  The lumbar 1, 2, 3 lamina/spinous processes were removed en-bloc.    Exuberant facet and ligamentum flavum hypertrophy was present, resulting in severe central and foraminal stenosis.  Right lumbar 1/2, 2/3, 3/4, 4/5 neuroforaminotomies were performed using Kerrison rongeurs.  The neuroforamen  were explored with a Dandy nerve hook; good decompression was noted.  The left lumbar 1/2, 2/3, 3/4 facets were removed, the pars were resected using the high-speed drill followed by Kerrison rongeurs.  This exposed the underlying lumbar 1/2, 2/3, 3/4 disc spaces.  The lumbar 3/4 disc was addressed initially.  The disc was incised sharply, and disc material removed using pituitary rongeurs.  7, 8, and 10 mm disc jenny were introduced into the disc space under navigation, once the disc was loosened it was removed using pituitary rongeurs.  The cartilaginous endplates were prepared using navigated rasp awls.  Once all levels had been prepped in like manner, BMP was placed into the disc spaces.  7-14 long Catylift interbody cages (Medtronic) were tamped into position under navigation.  The cages were expanded, good position of the cages were noted using fluoroscopy.  The disc bases were then backfilled through the cages using bone obtained from the laminectomy.  Gelfoam was placed over the exposed thecal sac and disc spaces.    Precurved rods were then placed spanning the pedicle screws, setscrews placed and final tightened.  The lumbar 1 through 4 transverse processes were decorticated using high-speed drill for the AMA drill bit.  BMP, morselized bone obtained from the laminectomy and 1 x 10 cm Magnifuse bone allograft were placed (Medtronic).  The operative site was copiously irrigated with antibiotic normal saline irrigation.  A medium Hemovac drain was placed, brought out through a separate incision and secured to the skin using 2-0 nylon suture.    The lumbar muscle and fascia were reapproximated in separate layers using 0 Vicryl suture in an interrupted manner.  The thick adipose layer was reapproximated in like manner.  The dermis was reapproximated using combination 2 and 3-0 Vicryl suture interrupted inverted manner.  The skin was reapproximated using staples.  A silver containing dressing was placed.   Neurophysiologic monitoring signals remained stable throughout the procedure.  Final counts were correct.    FINDINGS:  Significant central canal stenosis, and foraminal stenosis.  Near complete disc height loss.  Good disc height restitution, decompression and stabilization.  SPECIMEN:  None  DRAINS: Medium Hemovac  EBL:  350 CC  COMPLICATIONS:  None apparent at end of procedure.

## 2022-12-05 NOTE — ANESTHESIA PROCEDURE NOTES
Airway    Date/Time: 12/5/2022 9:04 AM  Performed by: Martinez Morales M.D.  Authorized by: Martinez Morales M.D.     Location:  OR  Urgency:  Elective  Difficult Airway: No    Indications for Airway Management:  Anesthesia      Spontaneous Ventilation: absent    Sedation Level:  Deep  Preoxygenated: Yes    Patient Position:  Sniffing  Mask Difficulty Assessment:  2 - vent by mask + OA or adjuvant +/- NMBA  Final Airway Type:  Endotracheal airway  Final Endotracheal Airway:  ETT  Cuffed: Yes    Technique Used for Successful ETT Placement:  Direct laryngoscopy    Insertion Site:  Oral  Blade Type:  Lux  Laryngoscope Blade/Videolaryngoscope Blade Size:  3  ETT Size (mm):  7.0  Measured from:  Lips  ETT to Lips (cm):  24  Placement Verified by: auscultation and capnometry    Cormack-Lehane Classification:  Grade I - full view of glottis  Number of Attempts at Approach:  1

## 2022-12-06 LAB
ANION GAP SERPL CALC-SCNC: 11 MMOL/L (ref 7–16)
BUN SERPL-MCNC: 18 MG/DL (ref 8–22)
CALCIUM SERPL-MCNC: 8.1 MG/DL (ref 8.5–10.5)
CHLORIDE SERPL-SCNC: 100 MMOL/L (ref 96–112)
CO2 SERPL-SCNC: 22 MMOL/L (ref 20–33)
CREAT SERPL-MCNC: 1.25 MG/DL (ref 0.5–1.4)
ERYTHROCYTE [DISTWIDTH] IN BLOOD BY AUTOMATED COUNT: 46 FL (ref 35.9–50)
GFR SERPLBLD CREATININE-BSD FMLA CKD-EPI: 62 ML/MIN/1.73 M 2
GLUCOSE SERPL-MCNC: 168 MG/DL (ref 65–99)
HCT VFR BLD AUTO: 31.3 % (ref 42–52)
HGB BLD-MCNC: 10.2 G/DL (ref 14–18)
MCH RBC QN AUTO: 29.9 PG (ref 27–33)
MCHC RBC AUTO-ENTMCNC: 32.6 G/DL (ref 33.7–35.3)
MCV RBC AUTO: 91.8 FL (ref 81.4–97.8)
PLATELET # BLD AUTO: 187 K/UL (ref 164–446)
PMV BLD AUTO: 10.2 FL (ref 9–12.9)
POTASSIUM SERPL-SCNC: 4.8 MMOL/L (ref 3.6–5.5)
RBC # BLD AUTO: 3.41 M/UL (ref 4.7–6.1)
SODIUM SERPL-SCNC: 133 MMOL/L (ref 135–145)
WBC # BLD AUTO: 9.1 K/UL (ref 4.8–10.8)

## 2022-12-06 PROCEDURE — 85027 COMPLETE CBC AUTOMATED: CPT

## 2022-12-06 PROCEDURE — A9270 NON-COVERED ITEM OR SERVICE: HCPCS | Performed by: NEUROLOGICAL SURGERY

## 2022-12-06 PROCEDURE — 700111 HCHG RX REV CODE 636 W/ 250 OVERRIDE (IP): Performed by: NEUROLOGICAL SURGERY

## 2022-12-06 PROCEDURE — G0378 HOSPITAL OBSERVATION PER HR: HCPCS

## 2022-12-06 PROCEDURE — 97162 PT EVAL MOD COMPLEX 30 MIN: CPT

## 2022-12-06 PROCEDURE — 700102 HCHG RX REV CODE 250 W/ 637 OVERRIDE(OP): Performed by: NEUROLOGICAL SURGERY

## 2022-12-06 PROCEDURE — 97165 OT EVAL LOW COMPLEX 30 MIN: CPT

## 2022-12-06 PROCEDURE — 700105 HCHG RX REV CODE 258: Performed by: NEUROLOGICAL SURGERY

## 2022-12-06 PROCEDURE — 80048 BASIC METABOLIC PNL TOTAL CA: CPT

## 2022-12-06 PROCEDURE — 51798 US URINE CAPACITY MEASURE: CPT

## 2022-12-06 PROCEDURE — 97535 SELF CARE MNGMENT TRAINING: CPT

## 2022-12-06 RX ORDER — OXYCODONE HYDROCHLORIDE 5 MG/1
5 TABLET ORAL EVERY 4 HOURS PRN
Status: DISCONTINUED | OUTPATIENT
Start: 2022-12-06 | End: 2022-12-08 | Stop reason: HOSPADM

## 2022-12-06 RX ORDER — SODIUM CHLORIDE 9 MG/ML
500 INJECTION, SOLUTION INTRAVENOUS ONCE
Status: COMPLETED | OUTPATIENT
Start: 2022-12-06 | End: 2022-12-06

## 2022-12-06 RX ORDER — MORPHINE SULFATE 4 MG/ML
3 INJECTION INTRAVENOUS
Status: DISCONTINUED | OUTPATIENT
Start: 2022-12-06 | End: 2022-12-08 | Stop reason: HOSPADM

## 2022-12-06 RX ORDER — SODIUM CHLORIDE 9 MG/ML
INJECTION, SOLUTION INTRAVENOUS CONTINUOUS
Status: DISCONTINUED | OUTPATIENT
Start: 2022-12-06 | End: 2022-12-08

## 2022-12-06 RX ORDER — SODIUM CHLORIDE 9 MG/ML
INJECTION, SOLUTION INTRAVENOUS CONTINUOUS
Status: DISCONTINUED | OUTPATIENT
Start: 2022-12-06 | End: 2022-12-06

## 2022-12-06 RX ORDER — TAMSULOSIN HYDROCHLORIDE 0.4 MG/1
0.4 CAPSULE ORAL
Status: DISCONTINUED | OUTPATIENT
Start: 2022-12-06 | End: 2022-12-08 | Stop reason: HOSPADM

## 2022-12-06 RX ADMIN — CEFAZOLIN 2 G: 2 INJECTION, POWDER, FOR SOLUTION INTRAMUSCULAR; INTRAVENOUS at 05:24

## 2022-12-06 RX ADMIN — SODIUM CHLORIDE: 9 INJECTION, SOLUTION INTRAVENOUS at 17:36

## 2022-12-06 RX ADMIN — DOCUSATE SODIUM 50 MG AND SENNOSIDES 8.6 MG 1 TABLET: 8.6; 5 TABLET, FILM COATED ORAL at 19:49

## 2022-12-06 RX ADMIN — DOCUSATE SODIUM 100 MG: 100 CAPSULE, LIQUID FILLED ORAL at 05:16

## 2022-12-06 RX ADMIN — TAMSULOSIN HYDROCHLORIDE 0.4 MG: 0.4 CAPSULE ORAL at 15:47

## 2022-12-06 RX ADMIN — SODIUM CHLORIDE: 9 INJECTION, SOLUTION INTRAVENOUS at 21:06

## 2022-12-06 RX ADMIN — CALCIUM CARBONATE 500 MG: 500 TABLET, CHEWABLE ORAL at 17:23

## 2022-12-06 RX ADMIN — ALPRAZOLAM 0.25 MG: 0.25 TABLET ORAL at 15:47

## 2022-12-06 RX ADMIN — CALCIUM CARBONATE 500 MG: 500 TABLET, CHEWABLE ORAL at 05:16

## 2022-12-06 RX ADMIN — BACLOFEN 10 MG: 10 TABLET ORAL at 14:47

## 2022-12-06 RX ADMIN — SODIUM CHLORIDE 500 ML: 9 INJECTION, SOLUTION INTRAVENOUS at 20:29

## 2022-12-06 RX ADMIN — VALSARTAN 160 MG: 80 TABLET, FILM COATED ORAL at 05:16

## 2022-12-06 RX ADMIN — ACETAMINOPHEN 1000 MG: 500 TABLET ORAL at 11:56

## 2022-12-06 RX ADMIN — FUROSEMIDE 20 MG: 20 TABLET ORAL at 05:17

## 2022-12-06 RX ADMIN — ACETAMINOPHEN 1000 MG: 500 TABLET ORAL at 05:15

## 2022-12-06 RX ADMIN — DOCUSATE SODIUM 100 MG: 100 CAPSULE, LIQUID FILLED ORAL at 17:23

## 2022-12-06 RX ADMIN — ACETAMINOPHEN 1000 MG: 500 TABLET ORAL at 17:22

## 2022-12-06 RX ADMIN — AMLODIPINE BESYLATE 10 MG: 10 TABLET ORAL at 05:15

## 2022-12-06 RX ADMIN — MECLIZINE 12.5 MG: 12.5 TABLET ORAL at 09:31

## 2022-12-06 RX ADMIN — BACLOFEN 10 MG: 10 TABLET ORAL at 21:12

## 2022-12-06 RX ADMIN — OXYCODONE 5 MG: 5 TABLET ORAL at 15:46

## 2022-12-06 RX ADMIN — BACLOFEN 10 MG: 10 TABLET ORAL at 10:30

## 2022-12-06 RX ADMIN — OXYCODONE 5 MG: 5 TABLET ORAL at 10:31

## 2022-12-06 ASSESSMENT — COGNITIVE AND FUNCTIONAL STATUS - GENERAL
TURNING FROM BACK TO SIDE WHILE IN FLAT BAD: A LITTLE
DAILY ACTIVITIY SCORE: 19
HELP NEEDED FOR BATHING: A LOT
STANDING UP FROM CHAIR USING ARMS: A LITTLE
CLIMB 3 TO 5 STEPS WITH RAILING: A LITTLE
MOBILITY SCORE: 18
DRESSING REGULAR LOWER BODY CLOTHING: A LOT
MOVING FROM LYING ON BACK TO SITTING ON SIDE OF FLAT BED: A LITTLE
SUGGESTED CMS G CODE MODIFIER MOBILITY: CK
MOVING TO AND FROM BED TO CHAIR: A LITTLE
SUGGESTED CMS G CODE MODIFIER DAILY ACTIVITY: CK
WALKING IN HOSPITAL ROOM: A LITTLE
TOILETING: A LITTLE

## 2022-12-06 ASSESSMENT — GAIT ASSESSMENTS
DISTANCE (FEET): 300
GAIT LEVEL OF ASSIST: SUPERVISED
ASSISTIVE DEVICE: FRONT WHEEL WALKER

## 2022-12-06 ASSESSMENT — PAIN DESCRIPTION - PAIN TYPE
TYPE: SURGICAL PAIN

## 2022-12-06 ASSESSMENT — ACTIVITIES OF DAILY LIVING (ADL): TOILETING: INDEPENDENT

## 2022-12-06 NOTE — PROGRESS NOTES
4 Eyes Skin Assessment Completed by Kobe SMILEY and Syl GARCIA RN.    Head WDL  Ears WDL  Nose WDL  Mouth WDL  Neck WDL  Breast/Chest WDL  Shoulder Blades WDL  Spine Incision  (R) Arm/Elbow/Hand WDL  (L) Arm/Elbow/Hand WDL  Abdomen WDL  Groin WDL  Scrotum/Coccyx/Buttocks WDL  (R) Leg WDL  (L) Leg WDL  (R) Heel/Foot/Toe WDL  (L) Heel/Foot/Toe WDL    Devices In Places Blood Pressure Cuff, Pulse Ox, and Trevino      Interventions In Place Pressure Redistribution Mattress    Possible Skin Injury No    Pictures Uploaded Into Epic N/A  Wound Consult Placed N/A  RN Wound Prevention Protocol Ordered Yes    Determine placement PT/OT

## 2022-12-06 NOTE — PROGRESS NOTES
Neurosurgery Progress Note    Subjective:  No events overnight   No complaints of pain while sitting up in bed, has pain level of 4 or more with movement      Exam:  AAO, fluent speech   PERRL, EOMI   Incision CDI   HVAC 200ml/8hr   BLE IP/quad 5/5, pf/df 5/5 sensation intact   Trevino   Pain control     BP  Min: 81/51  Max: 107/70  Pulse  Av.1  Min: 68  Max: 89  Resp  Av.4  Min: 13  Max: 20  Temp  Av.6 °C (97.8 °F)  Min: 36.4 °C (97.5 °F)  Max: 36.8 °C (98.2 °F)  SpO2  Av.9 %  Min: 87 %  Max: 96 %    No data recorded    Recent Labs     22  0044   WBC 9.1   RBC 3.41*   HEMOGLOBIN 10.2*   HEMATOCRIT 31.3*   MCV 91.8   MCH 29.9   MCHC 32.6*   RDW 46.0   PLATELETCT 187   MPV 10.2     Recent Labs     22  0044   SODIUM 133*   POTASSIUM 4.8   CHLORIDE 100   CO2 22   GLUCOSE 168*   BUN 18   CREATININE 1.25   CALCIUM 8.1*     Recent Labs     22  0735   INR 1.11           Intake/Output                         22 0700 - 22 0659 22 0700 - 22 0659     4183-4077 0472-7608 Total 3097-8032 2964-9330 Total                 Intake    I.V.  2500  4 2504  36  -- 36    PCA End of Shift Total Volume (ml) -- 4 4 36 -- 36    Volume (mL) (Lactated Ringers) 2500 -- 2500 -- -- --    Total Intake 2500 4 2504 36 -- 36       Output    Urine  280  400 680  --  -- --    Urine 250 -- 250 -- -- --    Output (mL) (Urethral Catheter Non-latex;Temperature probe 16 Fr.) 30 400 430 -- -- --    Drains  180  480 660  --  -- --    Output (mL) (Closed/Suction Drain 1 Midline Back Hemovac) 180 480 660 -- -- --    Blood  450  -- 450  --  -- --    Est. Blood Loss 450 -- 450 -- -- --    Total Output  -- -- --       Net I/O     1590 -876 714 36 -- 36              Intake/Output Summary (Last 24 hours) at 2022 0824  Last data filed at 2022 0700  Gross per 24 hour   Intake 2540 ml   Output 1790 ml   Net 750 ml             oxyCODONE immediate-release  5 mg Q4HRS PRN    morphine  injection  3 mg Q3HRS PRN    amLODIPine  10 mg DAILY    baclofen  10 mg TID    furosemide  20 mg DAILY    meclizine  12.5 mg TID PRN    metoprolol SR  50 mg BID    valsartan  160 mg BID    Pharmacy Consult Request  1 Each PHARMACY TO DOSE    MD ALERT...DO NOT ADMINISTER NSAIDS or ASPIRIN unless ORDERED By Neurosurgery  1 Each PRN    docusate sodium  100 mg BID    senna-docusate  1 Tablet Nightly    senna-docusate  1 Tablet Q24HRS PRN    polyethylene glycol/lytes  1 Packet BID PRN    bisacodyl  10 mg Q24HRS PRN    sodium phosphate  1 Each Once PRN    acetaminophen  1,000 mg Q6HRS    Followed by    [START ON 12/10/2022] acetaminophen  1,000 mg Q6HRS PRN    diphenhydrAMINE  25 mg Q6HRS PRN    Or    diphenhydrAMINE  25 mg Q6HRS PRN    ALPRAZolam  0.25 mg BID PRN    hydrALAZINE  10 mg Q HOUR PRN    benzocaine-menthol  1 Lozenge Q2HRS PRN    calcium carbonate  500 mg BID       Assessment and Plan:  Hospital day #2  POD #L1-4 TLIF   Prophylactic anticoagulation: no         Start date/time: TBD     Plan:  Stable neuro   DC PCA, orals (oxy 5mg Q4, baclofen TID, MS 4mg Q3 prn)  HVAC remains, record output Q8, can DC when 30 ml or less   PT/OT eval treat   Mobilize, meals in chair   DC noé

## 2022-12-06 NOTE — THERAPY
Physical Therapy   Initial Evaluation     Patient Name: Warren Templeton  Age:  72 y.o., Sex:  male  Medical Record #: [id number]  Today's Date: 12/6/2022     Precautions: Fall Risk;Spinal / Back Precautions   Comments: No brace per orders    Assessment  Patient is 72 y.o. male POD #1 elective L1-5 laminectomy/fusion.  PMH includes HTN, A fib, hx of TKA, and progressive back pain with bowel/bladder dysfunction & radiculopathy.  Today patient ambulated ~300ft with FWW and SPV, ambulating with slow, steady pace & frequent standing breaks (primarily to talk to therapists).  He demonstrated single limb balance with slight UE support, anticipate will be able to negotiate 1 step to enter home, family reported able to assist.  Educated patient on spine precautions and log roll with handout provided.  Educated on compensatory strategies for mobility at home and use of FWW for increased support.  Recommend FWW and home health PT.  Patient will not be actively followed for physical therapy services at this time, however may be seen if requested by physician for 1 more visit within 30 days to address any discharge or equipment needs.    Plan    Recommend Physical Therapy for Evaluation only.    DC Equipment Recommendations: Front-Wheel Walker  Discharge Recommendations: Recommend home health for continued physical therapy services     Objective     12/06/22 1224   Precautions   Precautions Fall Risk;Spinal / Back Precautions    Comments No brace per orders   Prior Living Situation   Prior Services None   Housing / Facility 1 Story House   Steps Into Home 1   Equipment Owned 4-Wheel Walker   Lives with - Patient's Self Care Capacity Spouse   Comments Pt with very supportive family (6 visitors in room), reported family able to assist as needed   Prior Level of Functional Mobility   Bed Mobility Independent   Transfer Status Independent   Ambulation Independent   Distance Ambulation (Feet) (community ambulator)   Assistive  Devices Used None   Stairs Independent   Cognition    Cognition / Consciousness WDL   Level of Consciousness Alert   Comments Pleasant & cooperative, good sense of humor   Active ROM Lower Body    Active ROM Lower Body  WDL   Strength Lower Body   Lower Body Strength  WDL   Comments BLE grossly 4+/5, slight pain with L hip flexion   Sensation Lower Body   Lower Extremity Sensation   WDL   Comments Denied numbness & tingling   Balance Assessment   Sitting Balance (Static) Good   Sitting Balance (Dynamic) Good   Standing Balance (Static) Fair   Standing Balance (Dynamic) Fair   Weight Shift Sitting Fair   Weight Shift Standing Fair   Gait Analysis   Gait Level Of Assist Supervised   Assistive Device Front Wheel Walker   Distance (Feet) 300   # of Times Distance was Traveled 1   Deviation (slow, shuffled pace)   Weight Bearing Status No restrictions   Comments Pt demonstrated adequate strength & balance to negotiate 1 step to enter home, >5 sec single limb balance per leg.  Family able to assist if needed   Bed Mobility    Supine to Sit Minimal Assist (due to 1st time OOB since sx, reported family able to help if needed)   Sit to Supine (NT, left up in chair)   Scooting Supervised (seated EOB)   Functional Mobility   Sit to Stand Standby Assist   Bed, Chair, Wheelchair Transfer Supervised   Transfer Method Stand Step   Mobility bed mobility, ambulation in brown/room/bathroom   Comments SBA to stand due to slow extension, improved with cues for forward lean   Activity Tolerance   Sitting in Chair Post session   Sitting Edge of Bed 10 min+   Standing 10 min+   Anticipated Discharge Equipment and Recommendations   DC Equipment Recommendations Front-Wheel Walker   Discharge Recommendations Recommend home health for continued physical therapy services

## 2022-12-06 NOTE — ANESTHESIA TIME REPORT
Anesthesia Start and Stop Event Times     Date Time Event    12/5/2022 0758 Ready for Procedure     0858 Anesthesia Start     1427 Anesthesia Stop        Responsible Staff  12/05/22    Name Role Begin End    Martinez Morales M.D. Anesth 0858 1427        Overtime Reason:  no overtime (within assigned shift)    Comments:

## 2022-12-06 NOTE — CARE PLAN
The patient is Watcher - Medium risk of patient condition declining or worsening         Progress made toward(s) clinical / shift goals:  Post op observation. Education started.     Patient is not progressing towards the following goals:

## 2022-12-06 NOTE — PROGRESS NOTES
Spiritual Care Note    Patient Information     Patient's Name: Warren Templeton   MRN: [id number]    YOB: 1950   Age and Gender: 72 y.o. male   Service Area: U   Room (and Bed): [place] [address]   Ethnicity or Nationality:     Primary Language: English   Scientologist/Spiritual preference: Pentecostal   Place of Residence: [place]   Family/Friends/Others Present: Yes   Clinical Team Present: No   Medical Diagnosis(-es)/Procedure(s):    Code Status: Full Code    Date of Admission: 12/5/2022   Length of Stay: 0 days        Spiritual Care Provider Information:  Name of Spiritual Care Provider: Ana Aguilar  Title of Spiritual Care Provider: Volunteer   Phone Number: [phone]  E-mail: [email or web address]   Total time : 10 minutes    Spiritual Screen Results:    Gen Nursing  Spiritual Screen  Would you like to receive a visit from our Spiritual Care team or your own Gnosticist or spiritual leader?: No  Was spiritual care education provided to the patient?: Yes     Palliative Care  PC Scientologist/Spiritual Screening  Was spiritual care education provided to the patient?: Yes      Encounter/Request Information  Encounter/Request Type   Visited With: Patient and family together  Nature of the Visit: Initial, On shift  General Visit: Yes  Referral From/ Origin of Request: Epic nursing    Religous Needs/Values       Spiritual Assessment   Spiritual Care Encounters  Observations/Symptoms: Accepting, Thankfulness  Interacton/Conversation: Pt, surrounded by family (spouse, children, grandchildren), thanked the  for stopping by but denied any needs.  Plan: Visit Upon Request    Notes:

## 2022-12-06 NOTE — PROGRESS NOTES
Assumed care. A/O. Pain 4/10. States feels good.Sipping on liquids. Joking with family. Spinal dressing CDI. CECILLE intact. Small amount of blood in tubing.

## 2022-12-06 NOTE — PROGRESS NOTES
Settled in. PCA started. Pain controlled well. Patient still needs some reminders/cues. Just a bit forgetful. Otherwise doing well. Urine cath draining well. Taking fluids well. Main IV fluids DC'd. Using IS. Seq on. Using call light appropriately.

## 2022-12-06 NOTE — OR NURSING
1424: pt arrived to PACU in stable condition. VSS. Dressing to back is CDI. Hemovac drain in place with moderate drainage.     1503: pt medicated for moderate pain. Pt tolerated well. Denies nausea at this time.     1530: MD Morales updated that pt blood pressures in 90s/50s and asymptomatic. MD gave verbal order to give 500 of fluid.     1600: pt blood pressure improving. MD updated and he is ok if pt goes to room as long as pt is asymptomatic.     Pt drinking water without issue. All belongings with pt.   Attempted to call pt wife and voicemail received with no name listed. Did not leave message and pt was updated on this.     Report called to Syl JENSEN. All pertinent pt information discussed. Pt transported to room in stable condition.     Pt transported to room in stable condition.

## 2022-12-06 NOTE — CARE PLAN
Problem: Pain - Standard  Goal: Alleviation of pain or a reduction in pain to the patient’s comfort goal  Outcome: Progressing     Problem: Knowledge Deficit - Standard  Goal: Patient and family/care givers will demonstrate understanding of plan of care, disease process/condition, diagnostic tests and medications  Outcome: Progressing   The patient is Stable - Low risk of patient condition declining or worsening    Shift Goals  Clinical Goals: Pain control  Patient Goals: rest    Progress made toward(s) clinical / shift goals:  The patient has been updated and he currently has  no pain issues    Patient is not progressing towards the following goals:

## 2022-12-06 NOTE — ANESTHESIA POSTPROCEDURE EVALUATION
Patient: Warren Templeton    Procedure Summary     Date: 12/05/22 Room / Location: [facility] OR 04 / SURGERY [facility]    Anesthesia Start: 0858 Anesthesia Stop: 1427    Procedures:       POSTERIOR L1-4 DISCECTOMY INTERBODY GRAFT, BILATERAL PEDICLE SCREWS L1-4 WITH OARM (Back)      LAMINECTOMY, SPINE, LUMBAR, WITH DISCECTOMY (Back) Diagnosis: (DEGENERATION OF LUMBAR INTERVERTEBRAL DISC)    Surgeons: Johnny Acuna M.D. Responsible Provider: Martinez Morales M.D.    Anesthesia Type: general ASA Status: 2          Final Anesthesia Type: general  Last vitals  BP   Blood Pressure : (!) 98/60    Temp   36.7 °C (98.1 °F)    Pulse   77   Resp   15    SpO2   94 %      Anesthesia Post Evaluation    Patient location during evaluation: PACU  Patient participation: complete - patient participated  Level of consciousness: awake and alert  Pain score: 5    Airway patency: patent  Anesthetic complications: no  Cardiovascular status: hemodynamically stable  Respiratory status: acceptable  Hydration status: euvolemic    PONV: none          No notable events documented.

## 2022-12-06 NOTE — DISCHARGE PLANNING
[facility]    Referral from:  Dr. Acuna  Insurance Provider on Facesheet:  Medicare/[employer]  Potential Rehab diagnosis: Other Ortho    Chart review indicates patient has ongoing medical management and may have therapy needs to possibly meet inpatient rehab facility criteria with the goal of returning to community.      D/C Support:  Spouse    Physiatry consult pended while waiting for additional information.  Operative day lumbar laminectomy.   Would welcome PT/OT as clinically appropriate.  TCC will follow.  Please reach out sooner if PMR consult requested for medical management.     Last Covid test:    Thank you for the referral.

## 2022-12-07 LAB
ANION GAP SERPL CALC-SCNC: 9 MMOL/L (ref 7–16)
BUN SERPL-MCNC: 26 MG/DL (ref 8–22)
CALCIUM SERPL-MCNC: 8 MG/DL (ref 8.5–10.5)
CHLORIDE SERPL-SCNC: 102 MMOL/L (ref 96–112)
CO2 SERPL-SCNC: 22 MMOL/L (ref 20–33)
CREAT SERPL-MCNC: 1.85 MG/DL (ref 0.5–1.4)
ERYTHROCYTE [DISTWIDTH] IN BLOOD BY AUTOMATED COUNT: 46.7 FL (ref 35.9–50)
GFR SERPLBLD CREATININE-BSD FMLA CKD-EPI: 39 ML/MIN/1.73 M 2
GLUCOSE SERPL-MCNC: 137 MG/DL (ref 65–99)
HCT VFR BLD AUTO: 27.4 % (ref 42–52)
HGB BLD-MCNC: 8.9 G/DL (ref 14–18)
MCH RBC QN AUTO: 30 PG (ref 27–33)
MCHC RBC AUTO-ENTMCNC: 32.5 G/DL (ref 33.7–35.3)
MCV RBC AUTO: 92.3 FL (ref 81.4–97.8)
PLATELET # BLD AUTO: 144 K/UL (ref 164–446)
PMV BLD AUTO: 10.7 FL (ref 9–12.9)
POTASSIUM SERPL-SCNC: 4.2 MMOL/L (ref 3.6–5.5)
RBC # BLD AUTO: 2.97 M/UL (ref 4.7–6.1)
SODIUM SERPL-SCNC: 133 MMOL/L (ref 135–145)
WBC # BLD AUTO: 8 K/UL (ref 4.8–10.8)

## 2022-12-07 PROCEDURE — 700105 HCHG RX REV CODE 258: Performed by: NEUROLOGICAL SURGERY

## 2022-12-07 PROCEDURE — G0378 HOSPITAL OBSERVATION PER HR: HCPCS

## 2022-12-07 PROCEDURE — 80048 BASIC METABOLIC PNL TOTAL CA: CPT

## 2022-12-07 PROCEDURE — 700102 HCHG RX REV CODE 250 W/ 637 OVERRIDE(OP): Performed by: NEUROLOGICAL SURGERY

## 2022-12-07 PROCEDURE — A9270 NON-COVERED ITEM OR SERVICE: HCPCS | Performed by: NEUROLOGICAL SURGERY

## 2022-12-07 PROCEDURE — 85027 COMPLETE CBC AUTOMATED: CPT

## 2022-12-07 RX ADMIN — OXYCODONE 5 MG: 5 TABLET ORAL at 04:07

## 2022-12-07 RX ADMIN — ACETAMINOPHEN 1000 MG: 500 TABLET ORAL at 11:16

## 2022-12-07 RX ADMIN — ACETAMINOPHEN 1000 MG: 500 TABLET ORAL at 04:07

## 2022-12-07 RX ADMIN — ACETAMINOPHEN 1000 MG: 500 TABLET ORAL at 17:05

## 2022-12-07 RX ADMIN — CALCIUM CARBONATE 500 MG: 500 TABLET, CHEWABLE ORAL at 17:06

## 2022-12-07 RX ADMIN — BACLOFEN 10 MG: 10 TABLET ORAL at 09:14

## 2022-12-07 RX ADMIN — SODIUM CHLORIDE: 9 INJECTION, SOLUTION INTRAVENOUS at 09:16

## 2022-12-07 RX ADMIN — BACLOFEN 10 MG: 10 TABLET ORAL at 22:37

## 2022-12-07 RX ADMIN — BACLOFEN 10 MG: 10 TABLET ORAL at 15:05

## 2022-12-07 RX ADMIN — OXYCODONE 5 MG: 5 TABLET ORAL at 11:15

## 2022-12-07 RX ADMIN — DOCUSATE SODIUM 100 MG: 100 CAPSULE, LIQUID FILLED ORAL at 17:06

## 2022-12-07 RX ADMIN — DOCUSATE SODIUM 100 MG: 100 CAPSULE, LIQUID FILLED ORAL at 04:08

## 2022-12-07 RX ADMIN — TAMSULOSIN HYDROCHLORIDE 0.4 MG: 0.4 CAPSULE ORAL at 14:13

## 2022-12-07 RX ADMIN — OXYCODONE 5 MG: 5 TABLET ORAL at 15:05

## 2022-12-07 RX ADMIN — CALCIUM CARBONATE 500 MG: 500 TABLET, CHEWABLE ORAL at 04:10

## 2022-12-07 RX ADMIN — DOCUSATE SODIUM 50 MG AND SENNOSIDES 8.6 MG 1 TABLET: 8.6; 5 TABLET, FILM COATED ORAL at 22:37

## 2022-12-07 RX ADMIN — OXYCODONE 5 MG: 5 TABLET ORAL at 22:36

## 2022-12-07 ASSESSMENT — PAIN DESCRIPTION - PAIN TYPE
TYPE: SURGICAL PAIN

## 2022-12-07 NOTE — THERAPY
"Occupational Therapy   Initial Evaluation     Patient Name: Warren Templeton  Age:  72 y.o., Sex:  male  Medical Record #: [id number]  Today's Date: 12/6/2022    Precautions: Fall Risk, Spinal / Back Precautions   Comments: No brace per orders    Assessment  Patient is 72 y.o. male with progressive low back pain rating down his legs.  He also has leg and foot numbness.  Pt is s/p L1-L4 TLIF, no brace ordered.  Today pt & family educated on spinal precautions during ADL's & homemaking tasks.  Wife reports she has had multiple surgeries & will be able to assist pt as needed upon D/C as well as other various family members.  Pt currently required SBA for ADL transfers & Mod A for LB dressing due to pain.  Pt was pleasant & receptive to new learning & is motivated.  Suspect pt will progress well as his pain subsides.  Pt has excellent family support & may benefit from HH therapy upon D/C.    Patient will not be actively followed for occupational therapy services at this time, however may be seen if requested by physician for 1 more visit within 30 days to address any discharge or equipment needs.      Plan    Recommend Occupational Therapy for Evaluation only.    DC Equipment Recommendations: Tub / Shower Seat, Raised Toilet Seat with Arms  Discharge Recommendations: Recommend home health for continued occupational therapy services     Subjective    \"I've got plenty of help available when I get home\"     Objective       12/06/22 1118   Prior Living Situation   Prior Services None   Housing / Facility 1 Story House   Steps Into Home 1   Bathroom Set up Walk In Shower;Shower Chair   Equipment Owned Tub / Shower Seat   Lives with - Patient's Self Care Capacity Spouse   Comments Pt has a large family with good support.  Wife has had multiple sx & has some AE at home   Prior Level of ADL Function   Self Feeding Independent   Grooming / Hygiene Independent   Bathing Independent   Dressing Independent   Toileting " Independent   Cognition    Comments pt is pleasant, [place] but receptive to new learning   Balance Assessment   Sitting Balance (Static) Good   Sitting Balance (Dynamic) Good   Standing Balance (Static) Fair +   Standing Balance (Dynamic) Fair   Weight Shift Sitting Good   Weight Shift Standing Fair   Bed Mobility    Supine to Sit Minimal Assist   Sit to Supine   (pt left up in chair)   Scooting Supervised   Rolling Supervised   Comments pt reports he has an adjustable bed at home   ADL Assessment   Eating Modified Independent   Grooming Supervision;Standing   Upper Body Dressing Supervision   Lower Body Dressing Moderate Assist   Toileting Minimal Assist   Functional Mobility   Sit to Stand Standby Assist   Bed, Chair, Wheelchair Transfer Standby Assist   Toilet Transfers Standby Assist   Session Information   Date / Session Number  12/6- D/C needs only

## 2022-12-07 NOTE — PROGRESS NOTES
Assumed care of patient  Assessment completed. BP pressure is soft . Patient OOB into chair. NP from Neuro  saw patient

## 2022-12-07 NOTE — DISCHARGE PLANNING
Agency/Facility Name: [facility]   Outcome: DPA left a voicemail regarding referral. DPA waiting on a call back for updates.    1423  Agency/Facility Name: [facility]   Spoke To: Vish   Outcome: Per Vish, referral is currently under review. DPA waiting on a call back for updates.    1535  Agency/Facility Name: [facility]    Spoke To: Day   Outcome: Per Day, referral is under review at this time. DPA waiting on a call back for updates.

## 2022-12-07 NOTE — DISCHARGE PLANNING
Received DME and HHC choice from [facility]. [facility] messaged Angella THOMASON for Dr. Acuna to request orders as Therapy has recommended HHC and DME. Choice forms faxed to Keenan HURST. Message sent to Keenan HURST to update.

## 2022-12-07 NOTE — PROGRESS NOTES
On call Neuro KATELIN Nicole notified of patient's BP 69/41 and  80 ml Hemovac output. New orders received for 500 ml NS bolus and to increase continuous NS fluids to 75 ml/hr.

## 2022-12-07 NOTE — CARE PLAN
The patient is Watcher - Medium risk of patient condition declining or worsening    Shift Goals  Clinical Goals: Monitor BP and urine & hemovac output  Patient Goals: rest and be able to pee    Progress made toward(s) clinical / shift goals:      Problem: Pain - Standard  Goal: Alleviation of pain or a reduction in pain to the patient’s comfort goal  Outcome: Progressing     Problem: Knowledge Deficit - Standard  Goal: Patient and family/care givers will demonstrate understanding of plan of care, disease process/condition, diagnostic tests and medications  Outcome: Progressing    Patient is not progressing towards the following goals:

## 2022-12-07 NOTE — DISCHARGE PLANNING
Received Choice form at 1120  Agency/Facility Name: [facility]   Referral sent per Choice form @ [phone]  Received Choice form at 1124  Agency/Facility Name: [facility]   Referral sent per Choice form @ [phone]

## 2022-12-07 NOTE — DISCHARGE PLANNING
Warren is not requiring 2 of 3 disciplines.  [facility] will no longer follow.  Please reach out to myself with any interval changes/questions.

## 2022-12-07 NOTE — CARE PLAN
Problem: Pain - Standard  Goal: Alleviation of pain or a reduction in pain to the patient’s comfort goal  12/7/2022 0941 by Lana Rowland R.N.  Outcome: Progressing  12/7/2022 0941 by Lana Rowland R.N.  Outcome: Progressing     Problem: Knowledge Deficit - Standard  Goal: Patient and family/care givers will demonstrate understanding of plan of care, disease process/condition, diagnostic tests and medications  12/7/2022 0941 by Lana Rowland R.N.  Outcome: Progressing  12/7/2022 0941 by Lana Rowland R.N.  Outcome: Progressing   The patient is Stable - Low risk of patient condition declining or worsening    Shift Goals  Clinical Goals: BP / Ambulate  Patient Goals: Rest    Progress made toward(s) clinical / shift goals:  The patient has been updated  He states that he feels  better and is in less pain  He is up in chair    Patient is not progressing towards the following goals:

## 2022-12-07 NOTE — FACE TO FACE
Face to Face Note  -  Durable Medical Equipment    MAICO Hargrove - NPI: [id number]  I certify that this patient is under my care and that they have had a durable medical equipment(DME)face to face encounter by myself that meets the physician DME face-to-face encounter requirements with this patient on:    Date of encounter:   Patient:                    MRN:                       YOB: 2020  Warren Templeton  [id number]  1950     The encounter with the patient was in whole, or in part, for the following medical condition, which is the primary reason for durable medical equipment:  Other - s/p Lumbar fusion     I certify that, based on my findings, the following durable medical equipment is medically necessary:  Walkers and Other DME Equipment - tub/shower seat, raised toilet seat with arms  .        ------------------------------------------------------------------------------------------------------------------    Face to Face Supporting Documentation - Home Health    The encounter with this patient was in whole or in part the primary reason for home health admission.    Date of encounter:   Patient:                    MRN:                       YOB: 2020  Warren Templeton  [id number]  1950     Home health to see patient for:  Physical Therapy evaluation and treatment  Occupational therapy   Skilled need for:      Skilled nursing interventions to include:  Wound Care    Homebound evidenced status by:  Need the aid of supportive devices such as crutches, canes, wheelchairs or walkers. Leaving home must require a considerable and taxing effort. There must exist a normal inability to leave the home.    Community Physician to provide follow up care: Nicole Porter D.O.     Optional Interventions    Wound information & treatment:    Home Infusion Therapy orders:    Line/Drain/Airway:    I certify the face to face encounter for this home care  referral meets the CMS requirements and the encounter/clinical assessment with the patient was, in whole, or in part, for the medical condition(s) listed above, which is the primary reason for home health care. Based on my clinical findings: the service(s) are medically necessary, support the need for home health care, and the homebound criteria are met.  I certify that this patient has had a face to face encounter by myself.  Angella Nicole A.P.R.N. - NPI: [id number]    *Debility, frailty and advanced age in the absence of an acute deterioration or exacerbation of a condition do not qualify a patient for home health.

## 2022-12-07 NOTE — PROGRESS NOTES
I notified  Shelley Nicole NP  the first time  at 1500  for no urine output for patient . I bladder scanned him and h e had 57 mls of urine . A new order was written     I notified  her again  at 4:52 for no urine and for a bladder scan of 74.  A  new order for ns  was ordered .

## 2022-12-07 NOTE — PROGRESS NOTES
Updated KATELIN Nicole on patient's BP post bolus 87/50. Per KATELIN Perales, ok to give additional 500 ml NS of bolus if systolic drops below 80's.

## 2022-12-08 VITALS
TEMPERATURE: 98.7 F | BODY MASS INDEX: 39.23 KG/M2 | RESPIRATION RATE: 18 BRPM | HEIGHT: 71 IN | SYSTOLIC BLOOD PRESSURE: 144 MMHG | HEART RATE: 78 BPM | OXYGEN SATURATION: 93 % | WEIGHT: 280.2 LBS | DIASTOLIC BLOOD PRESSURE: 85 MMHG

## 2022-12-08 LAB
ANION GAP SERPL CALC-SCNC: 8 MMOL/L (ref 7–16)
BUN SERPL-MCNC: 12 MG/DL (ref 8–22)
CALCIUM SERPL-MCNC: 8.9 MG/DL (ref 8.5–10.5)
CHLORIDE SERPL-SCNC: 105 MMOL/L (ref 96–112)
CO2 SERPL-SCNC: 26 MMOL/L (ref 20–33)
CREAT SERPL-MCNC: 1.06 MG/DL (ref 0.5–1.4)
ERYTHROCYTE [DISTWIDTH] IN BLOOD BY AUTOMATED COUNT: 46.8 FL (ref 35.9–50)
GFR SERPLBLD CREATININE-BSD FMLA CKD-EPI: 75 ML/MIN/1.73 M 2
GLUCOSE SERPL-MCNC: 123 MG/DL (ref 65–99)
HCT VFR BLD AUTO: 30.5 % (ref 42–52)
HGB BLD-MCNC: 9.8 G/DL (ref 14–18)
MCH RBC QN AUTO: 29.7 PG (ref 27–33)
MCHC RBC AUTO-ENTMCNC: 32.1 G/DL (ref 33.7–35.3)
MCV RBC AUTO: 92.4 FL (ref 81.4–97.8)
PLATELET # BLD AUTO: 190 K/UL (ref 164–446)
PMV BLD AUTO: 10.4 FL (ref 9–12.9)
POTASSIUM SERPL-SCNC: 4.4 MMOL/L (ref 3.6–5.5)
RBC # BLD AUTO: 3.3 M/UL (ref 4.7–6.1)
SODIUM SERPL-SCNC: 139 MMOL/L (ref 135–145)
WBC # BLD AUTO: 8 K/UL (ref 4.8–10.8)

## 2022-12-08 PROCEDURE — A9270 NON-COVERED ITEM OR SERVICE: HCPCS | Performed by: NEUROLOGICAL SURGERY

## 2022-12-08 PROCEDURE — G0378 HOSPITAL OBSERVATION PER HR: HCPCS

## 2022-12-08 PROCEDURE — A9270 NON-COVERED ITEM OR SERVICE: HCPCS | Performed by: NURSE PRACTITIONER

## 2022-12-08 PROCEDURE — 85027 COMPLETE CBC AUTOMATED: CPT

## 2022-12-08 PROCEDURE — 700102 HCHG RX REV CODE 250 W/ 637 OVERRIDE(OP): Performed by: NEUROLOGICAL SURGERY

## 2022-12-08 PROCEDURE — 700102 HCHG RX REV CODE 250 W/ 637 OVERRIDE(OP): Performed by: NURSE PRACTITIONER

## 2022-12-08 PROCEDURE — 80048 BASIC METABOLIC PNL TOTAL CA: CPT

## 2022-12-08 RX ORDER — OXYCODONE HYDROCHLORIDE 5 MG/1
5 TABLET ORAL EVERY 4 HOURS PRN
Qty: 42 TABLET | Refills: 0
Start: 2022-12-08 | End: 2022-12-15

## 2022-12-08 RX ORDER — OMEPRAZOLE 20 MG/1
20 CAPSULE, DELAYED RELEASE ORAL
Status: DISCONTINUED | OUTPATIENT
Start: 2022-12-08 | End: 2022-12-08 | Stop reason: HOSPADM

## 2022-12-08 RX ORDER — ACETAMINOPHEN 500 MG
1000 TABLET ORAL EVERY 6 HOURS PRN
COMMUNITY
Start: 2022-12-08

## 2022-12-08 RX ORDER — POTASSIUM CHLORIDE 20 MEQ/1
10 TABLET, EXTENDED RELEASE ORAL DAILY
Status: DISCONTINUED | OUTPATIENT
Start: 2022-12-08 | End: 2022-12-08 | Stop reason: HOSPADM

## 2022-12-08 RX ADMIN — AMLODIPINE BESYLATE 10 MG: 10 TABLET ORAL at 06:15

## 2022-12-08 RX ADMIN — CALCIUM CARBONATE 500 MG: 500 TABLET, CHEWABLE ORAL at 06:17

## 2022-12-08 RX ADMIN — VALSARTAN 160 MG: 80 TABLET, FILM COATED ORAL at 06:15

## 2022-12-08 RX ADMIN — ALPRAZOLAM 0.25 MG: 0.25 TABLET ORAL at 02:05

## 2022-12-08 RX ADMIN — FUROSEMIDE 20 MG: 20 TABLET ORAL at 06:15

## 2022-12-08 RX ADMIN — OXYCODONE 5 MG: 5 TABLET ORAL at 06:17

## 2022-12-08 RX ADMIN — OMEPRAZOLE 20 MG: 20 CAPSULE, DELAYED RELEASE ORAL at 10:30

## 2022-12-08 RX ADMIN — TAMSULOSIN HYDROCHLORIDE 0.4 MG: 0.4 CAPSULE ORAL at 12:11

## 2022-12-08 RX ADMIN — DOCUSATE SODIUM 100 MG: 100 CAPSULE, LIQUID FILLED ORAL at 06:15

## 2022-12-08 RX ADMIN — ACETAMINOPHEN 1000 MG: 500 TABLET ORAL at 12:11

## 2022-12-08 RX ADMIN — BACLOFEN 10 MG: 10 TABLET ORAL at 07:52

## 2022-12-08 RX ADMIN — METOPROLOL SUCCINATE 50 MG: 50 TABLET, EXTENDED RELEASE ORAL at 06:15

## 2022-12-08 RX ADMIN — POTASSIUM CHLORIDE 10 MEQ: 1500 TABLET, EXTENDED RELEASE ORAL at 10:30

## 2022-12-08 ASSESSMENT — PAIN DESCRIPTION - PAIN TYPE: TYPE: SURGICAL PAIN

## 2022-12-08 NOTE — DISCHARGE INSTRUCTIONS
Discharge Instructions    Discharged to home by car with relative. Discharged via wheelchair, hospital escort: Yes.  Special equipment needed: NA    Be sure to schedule a follow-up appointment with your primary care doctor or any specialists as instructed.     Discharge Plan:   Diet Plan: Discussed  Activity Level: Discussed  Confirmed Follow up Appointment: Patient to Call and Schedule Appointment  Confirmed Symptoms Management: Discussed  Medication Reconciliation Updated: Yes  Influenza Vaccine Indication: Not indicated: Previously immunized this influenza season and > 8 years of age    I understand that a diet low in cholesterol, fat, and sodium is recommended for good health. Unless I have been given specific instructions below for another diet, I accept this instruction as my diet prescription.   Other diet: resume diet as tolerated    Special Instructions: None    -Is this patient being discharged with medication to prevent blood clots?  No    Is patient discharged on Warfarin / Coumadin?   No

## 2022-12-08 NOTE — CARE PLAN
Shift Goals  Clinical Goals: monitoring I/O for adequate UOP and decreased hemovac output  Patient Goals: rest, pain, distraction, activity  Family Goals: ESPINOZA    Progress made toward(s) clinical / shift goals:    Problem: Pain - Standard  Goal: Alleviation of pain or a reduction in pain to the patient’s comfort goal  Outcome: Progressing     Problem: Knowledge Deficit - Standard  Goal: Patient and family/care givers will demonstrate understanding of plan of care, disease process/condition, diagnostic tests and medications  Outcome: Progressing       Patient is not progressing towards the following goals:

## 2022-12-08 NOTE — DISCHARGE PLANNING
[facility] DCE chart review complete. Choice obtained for HH and DME FWW, faxed to DPA and given to CM. Thank you.

## 2022-12-08 NOTE — DISCHARGE PLANNING
Agency/Facility Name: [facility]    Outcome: DPA left v-mail requesting update on referral.     1144-  Agency/Facility Name: [facility]   Outcome: DPA left v-mail requesting update on referral.

## 2022-12-08 NOTE — PROGRESS NOTES
Neurosurgery Progress Note    Subjective:  No acute events overnight   Numbness/tingling of feet resolved post op  Sitting up in chair at bedside for lunch. Drowsy    Exam:  AAO, fluent speech. Drowsy  PERRL, EOMI   Incision CDI. No redness, drainage, swelling.   HVAC serous drainage  Strength: BLE IP/quad/pf/df 5/5   Sensation intact   Voiding +BM 2022  Pain controled    BP  Min: 87/57  Max: 144/85  Pulse  Av.3  Min: 78  Max: 104  Resp  Av.5  Min: 16  Max: 18  Temp  Av °C (98.6 °F)  Min: 36.8 °C (98.3 °F)  Max: 37.1 °C (98.8 °F)  SpO2  Av.8 %  Min: 92 %  Max: 96 %    No data recorded    Recent Labs     22  0044 22  0455 22  0703   WBC 9.1 8.0 8.0   RBC 3.41* 2.97* 3.30*   HEMOGLOBIN 10.2* 8.9* 9.8*   HEMATOCRIT 31.3* 27.4* 30.5*   MCV 91.8 92.3 92.4   MCH 29.9 30.0 29.7   MCHC 32.6* 32.5* 32.1*   RDW 46.0 46.7 46.8   PLATELETCT 187 144* 190   MPV 10.2 10.7 10.4       Recent Labs     22  0044 22  0455 22  0703   SODIUM 133* 133* 139   POTASSIUM 4.8 4.2 4.4   CHLORIDE 100 102 105   CO2 22 22 26   GLUCOSE 168* 137* 123*   BUN 18 26* 12   CREATININE 1.25 1.85* 1.06   CALCIUM 8.1* 8.0* 8.9                   Intake/Output                         22 - 22 0659 22 - 2259     9735-7330 2387-0268 Total [id number] Total                 Intake    Total Intake -- -- -- -- -- --       Output    Urine  1200  3125 4325  1250  -- 1250    Number of Times Voided 3 x 7 x 10 x -- -- --    Urine Void (mL) 1200 3125 4325 1250 -- 1250    Drains  250  200 450  80  -- 80    Output (mL) (Closed/Suction Drain 1 Midline Back Hemovac) 250 200 450 80 -- 80    Total Output 1453 5133 3675 1330 -- 1330       Net I/O     -1450 -3325 -4775 -1330 -- -1330              Intake/Output Summary (Last 24 hours) at 2022 1428  Last data filed at 2022 1100  Gross per 24 hour   Intake --   Output 5085 ml   Net -5085 ml               omeprazole  20  mg Q48HRS    potassium chloride SA  10 mEq DAILY    oxyCODONE immediate-release  5 mg Q4HRS PRN    morphine injection  3 mg Q3HRS PRN    tamsulosin  0.4 mg AFTER LUNCH    NS   Continuous    amLODIPine  10 mg DAILY    baclofen  10 mg TID    furosemide  20 mg DAILY    meclizine  12.5 mg TID PRN    metoprolol SR  50 mg BID    valsartan  160 mg BID    Pharmacy Consult Request  1 Each PHARMACY TO DOSE    MD ALERT...DO NOT ADMINISTER NSAIDS or ASPIRIN unless ORDERED By Neurosurgery  1 Each PRN    docusate sodium  100 mg BID    senna-docusate  1 Tablet Nightly    senna-docusate  1 Tablet Q24HRS PRN    polyethylene glycol/lytes  1 Packet BID PRN    bisacodyl  10 mg Q24HRS PRN    sodium phosphate  1 Each Once PRN    acetaminophen  1,000 mg Q6HRS    Followed by    [START ON 12/10/2022] acetaminophen  1,000 mg Q6HRS PRN    diphenhydrAMINE  25 mg Q6HRS PRN    Or    diphenhydrAMINE  25 mg Q6HRS PRN    ALPRAZolam  0.25 mg BID PRN    hydrALAZINE  10 mg Q HOUR PRN    benzocaine-menthol  1 Lozenge Q2HRS PRN    calcium carbonate  500 mg BID       Assessment and Plan:  Hospital day #3  POD #3  L1-4 TLIF   Prophylactic anticoagulation: no         Start date/time: TBD     Plan:  Stable neuro   Discontinue HVAC  PT/OT  Mobilize, up to chair for meals  Labs improved this am  Awaiting HH acceptance. Pt ready to DC home once HH arranged.   Medications eprescribed to his pharmacy through our office.   -oxycodone 5mg q4hr prn

## 2022-12-08 NOTE — PROGRESS NOTES
Neurosurgery Progress Note    Subjective:  No acute events overnight   Numbness/tingling of feet resolved post op  Mild hypotension, bolus and IVF started overnight    Exam:  AAO, fluent speech   PERRL, EOMI   Incision CDI   HVAC 100ml/8hr   BLE IP/quad 5/5, pf/df 5/5 sensation intact   voiding  Pain controled    BP  Min: 87/57  Max: 101/63  Pulse  Av.4  Min: 82  Max: 104  Resp  Av.3  Min: 16  Max: 18  Temp  Av.1 °C (98.7 °F)  Min: 36.9 °C (98.5 °F)  Max: 37.1 °C (98.8 °F)  SpO2  Av.1 %  Min: 91 %  Max: 96 %    No data recorded    Recent Labs     22  0044 22  0455   WBC 9.1 8.0   RBC 3.41* 2.97*   HEMOGLOBIN 10.2* 8.9*   HEMATOCRIT 31.3* 27.4*   MCV 91.8 92.3   MCH 29.9 30.0   MCHC 32.6* 32.5*   RDW 46.0 46.7   PLATELETCT 187 144*   MPV 10.2 10.7       Recent Labs     22  0044 22  0455   SODIUM 133* 133*   POTASSIUM 4.8 4.2   CHLORIDE 100 102   CO2 22 22   GLUCOSE 168* 137*   BUN 18 26*   CREATININE 1.25 1.85*   CALCIUM 8.1* 8.0*       Recent Labs     22  0735   INR 1.11             Intake/Output                         22 - 2259 22 - 22 0659     9333-4553 1100-3341 Total 2513-3281 6899-4408 Total                 Intake    I.V.  36  -- 36  --  -- --    PCA End of Shift Total Volume (ml) 36 -- 36 -- -- --    Total Intake 36 -- 36 -- -- --       Output    Urine  400  1575 1975  1200  1000 2200    Number of Times Voided -- 4 x 4 x 3 x 3 x 6 x    Urine Void (mL) -- 1575 1575 1200 1000 2200    Output (mL) ([REMOVED] Urethral Catheter Non-latex;Temperature probe 16 Fr.) 400 -- 400 -- -- --    Drains  320  180 500  250  50 300    Output (mL) (Closed/Suction Drain 1 Midline Back Hemovac) 320 180 500 250 50 300    Total Output 590 1755 2561 1459 1050 2166       Net I/O     -684 -1755 -2439 -1450 -1050 -2500              Intake/Output Summary (Last 24 hours) at 2022  Last data filed at 2022  Gross per 24 hour   Intake --    Output 4175 ml   Net -4175 ml               oxyCODONE immediate-release  5 mg Q4HRS PRN    morphine injection  3 mg Q3HRS PRN    tamsulosin  0.4 mg AFTER LUNCH    NS   Continuous    amLODIPine  10 mg DAILY    baclofen  10 mg TID    furosemide  20 mg DAILY    meclizine  12.5 mg TID PRN    metoprolol SR  50 mg BID    valsartan  160 mg BID    Pharmacy Consult Request  1 Each PHARMACY TO DOSE    MD ALERT...DO NOT ADMINISTER NSAIDS or ASPIRIN unless ORDERED By Neurosurgery  1 Each PRN    docusate sodium  100 mg BID    senna-docusate  1 Tablet Nightly    senna-docusate  1 Tablet Q24HRS PRN    polyethylene glycol/lytes  1 Packet BID PRN    bisacodyl  10 mg Q24HRS PRN    sodium phosphate  1 Each Once PRN    acetaminophen  1,000 mg Q6HRS    Followed by    [START ON 12/10/2022] acetaminophen  1,000 mg Q6HRS PRN    diphenhydrAMINE  25 mg Q6HRS PRN    Or    diphenhydrAMINE  25 mg Q6HRS PRN    ALPRAZolam  0.25 mg BID PRN    hydrALAZINE  10 mg Q HOUR PRN    benzocaine-menthol  1 Lozenge Q2HRS PRN    calcium carbonate  500 mg BID       Assessment and Plan:  Hospital day #2  POD #2  L1-4 TLIF   Prophylactic anticoagulation: no         Start date/time: TBD     Plan:  Stable neuro   HVAC remains,  DC when 30 ml or less   PT/OT  Mobilize, up to chair for meals  Monitor H/H, Renal function, Na level  CBC, BMP in am  Cont IVF

## 2022-12-08 NOTE — PROGRESS NOTES
Pt given discharge instructions.  Discussed diet, activity, follow up appointments, symptoms and management, and prescriptions provided.  Packet sent with patient.  IV and hemovac d/c'd by inpatient RN, and all questions answered.  Prescriptions confirmed in progress at outpatient pharmacy.

## 2022-12-15 NOTE — DISCHARGE SUMMARY
Discharge Summary    CHIEF COMPLAINT ON ADMISSION   progressive low back pain rating down his legs.  He also has leg and foot numbness.  He is using a walker due to the symptoms.  He has failed conservative management including extensive physical therapy.    Reason for Admission  Lumbar stenosis with neurogenic claudication    Admission Date  12/5/2022    CODE STATUS  Full Code    HPI & HOSPITAL COURSE  71yo male admitted the day of surgery. Surgery went as planned without complication. POD #1 patient is doing well. HVAC remains for highoutput. PT/OT recs pending. POD #2 HVAC remains for debi output. Patient reports numbness tingling of his feet has resolved post op. He had mild hypotension overnight requiring a bolus of IV fluids and continuous IV fluids started. PT rec HH. POD #3 hypotension resolved. HVAC dc'd. HH being arranged by CM. Pt is ready to dc home once HH arranged     Therefore, he is discharged in good and stable condition to home with close outpatient follow-up.    The patient met 2-midnight criteria for an inpatient stay at the time of discharge.    Discharge Date  12/8/2022    FOLLOW UP ITEMS POST DISCHARGE  [facility] as scheduled    DISCHARGE DIAGNOSES  Principal Problem:    Lumbar stenosis with neurogenic claudication POA: Yes  Resolved Problems:    * No resolved hospital problems. *      FOLLOW UP  Future Appointments   Date Time Provider Department Center   2/13/2023  1:10 PM [facility] None   2/16/2023  1:15 PM [facility] EXAM 9 ECHO [facility]     Johnny Acuna M.D.  [address]  [place] [address]  [phone]    Call        MEDICATIONS ON DISCHARGE     Medication List        START taking these medications        Instructions   acetaminophen 500 MG Tabs  Commonly known as: TYLENOL   Take 2 Tablets by mouth every 6 hours as needed for Mild Pain or Moderate Pain.  Dose: 1,000 mg     oxyCODONE immediate-release 5 MG Tabs  Commonly known as: ROXICODONE    Take 1 Tablet by mouth every four hours as needed for Severe Pain for up to 7 days.  Dose: 5 mg            CHANGE how you take these medications        Instructions   amLODIPine 10 MG Tabs  What changed: when to take this  Commonly known as: NORVASC   TAKE 1 TABLET BY MOUTH EVERY DAY     fluticasone 50 MCG/ACT nasal spray  What changed:   how much to take  how to take this  when to take this  reasons to take this  additional instructions  Commonly known as: FLONASE   Administer 1 spray into each nostril twice daily for 2 weeks, then daily thereafter.     metoprolol SR 50 MG Tb24  What changed: when to take this  Commonly known as: TOPROL XL   TAKE 1 TABLET BY MOUTH TWICE DAILY     potassium chloride SA 10 MEQ Tbcr  What changed:   how much to take  when to take this  Commonly known as: K-DUR   TAKE 2 TABLETS BY MOUTH EVERY DAY            CONTINUE taking these medications        Instructions   baclofen 10 MG Tabs  Commonly known as: LIORESAL   Take 1 Tablet by mouth 3 times a day.  Dose: 10 mg     fluocinonide 0.05 % external solution  Commonly known as: LIDEX   Apply 0.05 Applicators to affected area(s) every day.  Dose: 0.05 Applicator     furosemide 20 MG Tabs  Commonly known as: LASIX   TAKE 1 TABLET BY MOUTH EVERY DAY     IRON PO   Take 1 Tablet by mouth every 3 days. Takes Tuesday, Thursday, Sunday  Dose: 1 Tablet     meclizine 12.5 MG Tabs  Commonly known as: ANTIVERT   TAKE 1 TABLET BY MOUTH THREE TIMES DAILY AS NEEDED     omeprazole 20 MG delayed-release capsule  Commonly known as: PRILOSEC   Take 1 Capsule by mouth every 48 hours.  Dose: 20 mg     valsartan 160 MG Tabs  Commonly known as: DIOVAN   Take 1 Tablet by mouth 2 times a day.  Dose: 160 mg            STOP taking these medications      ALPRAZolam 0.25 MG Tabs  Commonly known as: XANAX     ascorbic acid 500 MG Tabs  Commonly known as: ascorbic acid     CITRACAL PO     cyanocobalamin 500 MCG Tabs  Commonly known as: VITAMIN B-12     Eliquis 5mg  Tabs  Generic drug: apixaban     OPTISOURCE POST BARIATRIC SURG PO     PRE- PO     VITAMIN D3 PO              Allergies  Allergies   Allergen Reactions    Magnesium Oxide Rash     Sores on scalp, rash on head&body, itchy anus       DIET  No orders of the defined types were placed in this encounter.      ACTIVITY  Keep incision clean and dry. No dressing required over incision.   OK to shower and pat dry.   Do not soak incision in bath, hot tub, etc.   Take over the counter stool softener daily with pain medication.   Do not lift anything over 10 pounds. No repetitive bending, lifting, twisting, movements.   No Aspirin or anticoagulants until cleared by Neurosurgery.   No driving if taking narcotic medication.   Follow up with [facility] in 2 weeks as scheduled,         CONSULTATIONS  None    PROCEDURES  1.  Lumbar 1, 2, 3, 4 laminectomy  2.  right Lumbar 1/2, 2/3, 3/4, 4/5 foraminotomy  3.  Left facetectomy lumbar 1/2, 2/3, 3/4 with pars resection  4.  Bilateral pedicle screw placement lumbar 1, 2, 3, 4 (Solaris, Medtronic)  5.  Diskectomy, interbody graft placement lumbar 1/2, 2/3, 3/4 (Catalyft, Medtroniic)  6.  Bone autograft obtained from same incision, bone allograft (Magnifuse, Medtronic), BMP placement  7.  [employer] intraoperative navigation  8.  Neurophysiologic monitoring  9.  Interbody, intertransverse process fusion with bone graft       LABORATORY  Lab Results   Component Value Date    SODIUM 139 2022    POTASSIUM 4.4 2022    CHLORIDE 105 2022    CO2 26 2022    GLUCOSE 123 (H) 2022    BUN 12 2022    CREATININE 1.06 2022        Lab Results   Component Value Date    WBC 8.0 2022    HEMOGLOBIN 9.8 (L) 2022    HEMATOCRIT 30.5 (L) 2022    PLATELETCT 190 2022        Total time of the discharge process exceeds 30 minutes.

## 2023-01-09 NOTE — TELEPHONE ENCOUNTER
Per Emiliana at [facility], the diagnosis for ASV must be either G47.31 or  G47.39.    The order needs to be changed.

## 2023-01-12 ENCOUNTER — NON-PROVIDER VISIT (OUTPATIENT)
Dept: URGENT CARE | Facility: PHYSICIAN GROUP | Age: 73
End: 2023-01-12
Payer: MEDICARE

## 2023-01-12 ENCOUNTER — APPOINTMENT (OUTPATIENT)
Dept: RADIOLOGY | Facility: IMAGING CENTER | Age: 73
End: 2023-01-12
Attending: NEUROLOGICAL SURGERY
Payer: MEDICARE

## 2023-01-12 DIAGNOSIS — M54.16 LUMBAR RADICULOPATHY: ICD-10-CM

## 2023-01-12 PROCEDURE — 72100 X-RAY EXAM L-S SPINE 2/3 VWS: CPT | Mod: TC,FY | Performed by: PHYSICIAN ASSISTANT

## 2023-01-23 DIAGNOSIS — I51.89 DIASTOLIC DYSFUNCTION: ICD-10-CM

## 2023-01-23 DIAGNOSIS — I11.9 BENIGN HYPERTENSIVE HEART DISEASE WITHOUT HEART FAILURE: ICD-10-CM

## 2023-01-23 DIAGNOSIS — I34.0 NONRHEUMATIC MITRAL VALVE REGURGITATION: ICD-10-CM

## 2023-01-25 NOTE — TELEPHONE ENCOUNTER
TW: Please review and approve RX if appropriate. Creatnine not within .2 per refill protocol. Thank you!

## 2023-01-26 RX ORDER — FUROSEMIDE 20 MG/1
20 TABLET ORAL
Qty: 90 TABLET | Refills: 2 | Status: SHIPPED | OUTPATIENT
Start: 2023-01-26 | End: 2023-10-02 | Stop reason: SDUPTHER

## 2023-02-13 ENCOUNTER — APPOINTMENT (OUTPATIENT)
Dept: SLEEP MEDICINE | Facility: MEDICAL CENTER | Age: 73
End: 2023-02-13
Payer: MEDICARE

## 2023-03-07 ENCOUNTER — TELEPHONE (OUTPATIENT)
Dept: CARDIOLOGY | Facility: MEDICAL CENTER | Age: 73
End: 2023-03-07
Payer: MEDICARE

## 2023-03-07 NOTE — TELEPHONE ENCOUNTER
Pt has active request at preferred pharmacy. Informed pt via [employer] to contact pharmacy for further refills.

## 2023-03-07 NOTE — TELEPHONE ENCOUNTER
TW    Caller: Warren Templeton    Medication Name and Dosage:  valsartan (DIOVAN) 160 MG Tab    Medication amount left: About 1 week    Preferred Pharmacy:  [id number] - [place], [place] - [address] AT [place] [address] & [address]   Phone: [phone]  Fax: [phone]    Other questions (Topic): 90 days supply is preferred.     Callback Number (Will only call for issues): [phone]    Thank you,  -Abril AVALOS

## 2023-03-08 ENCOUNTER — TELEPHONE (OUTPATIENT)
Dept: CARDIOLOGY | Facility: MEDICAL CENTER | Age: 73
End: 2023-03-08
Payer: MEDICARE

## 2023-03-08 NOTE — TELEPHONE ENCOUNTER
RADHAMES Templeton Kelley: [id number] help? Call us at [phone]  Status  New(Not sent to plan)  Drug  Valsartan 160MG tablets  Form  [employer] Medicare Part D Electronic Prior Authorization Form (2017 NCPDP)

## 2023-03-08 NOTE — TELEPHONE ENCOUNTER
PA sent to marquez Templeton Key: [id number] - PA Case ID: [id number] help? Call us at [phone]  Status  Sent to [facility]  Drug  Valsartan 160MG tablets  Form  [employer] Medicare Part D Electronic Prior Authorization Form (2017 NCPDP)

## 2023-03-08 NOTE — TELEPHONE ENCOUNTER
Called [employer] to complete PA.    Warren Templeton Kelley: [id number] - Rx #: [id number] help? Call us at [phone]  Outcome  Additional Information Required  Message from [employer]: Drug is not covered by plan  Drug  Valsartan 160MG tablets  Form  [employer] Electronic PA Form (2017 NCPDP)  Original Claim Info  70,75 Prior Auth In Progress - Check Fax  ? CoverMyMeds Recommendation   ?  This medication may be excluded from the patient's benefit. For more information, please reach out to [employer] directly at [phone].

## 2023-03-10 NOTE — TELEPHONE ENCOUNTER
PA not needed, medication available without authorization.     Warren Templeton Key: [id number] - PA Case ID: [id number] help? Call us at [phone]  Outcome  N/Aon March 9  This medication or product is on your plan's list of covered drugs. Prior authorization is not required at this time. If your pharmacy has questions regarding the processing of your prescription, please have them call the [facility] pharmacy help desk at [phone]. **Please note: This request was submitted electronically. Formulary lowering, tiering exception, cost reduction and/or pre-benefit determination review (including prospective Medicare hospice reviews) requests cannot be requested using this method of submission. Providers contact us at [phone] for further assistance.  Drug  Valsartan 160MG tablets  Form  "[employer] Medicare Part D Electronic Prior Authorization Form (2017 NCPDP)

## 2023-03-15 ENCOUNTER — TELEPHONE (OUTPATIENT)
Dept: CARDIOLOGY | Facility: MEDICAL CENTER | Age: 73
End: 2023-03-15
Payer: MEDICARE

## 2023-03-15 NOTE — TELEPHONE ENCOUNTER
TW    Caller: Felicita Sutton from [employer]    Topic/issue: Is needing information for Warren, he has sent numerous letters and is getting no response  The need medication review and records. He has also sent info to Medical Records.     Callback Number: - [phone]  Fax - [phone]    Thank you,  Susana CHAN

## 2023-03-16 ENCOUNTER — TELEPHONE (OUTPATIENT)
Dept: CARDIOLOGY | Facility: MEDICAL CENTER | Age: 73
End: 2023-03-16
Payer: MEDICARE

## 2023-03-16 DIAGNOSIS — I34.0 NONRHEUMATIC MITRAL VALVE REGURGITATION: ICD-10-CM

## 2023-03-16 DIAGNOSIS — I11.9 BENIGN HYPERTENSIVE HEART DISEASE WITHOUT HEART FAILURE: ICD-10-CM

## 2023-03-16 DIAGNOSIS — I48.0 PAROXYSMAL ATRIAL FIBRILLATION (HCC): ICD-10-CM

## 2023-03-16 DIAGNOSIS — I51.89 DIASTOLIC DYSFUNCTION: ICD-10-CM

## 2023-03-16 NOTE — TELEPHONE ENCOUNTER
MEDICATION REFILL : TW    Caller: Warren Templeton    Medication Name and Dosage:     ELIQUIS 5MG     Please call your pharmacy and have them send us a refill request or speak to a live representative, RX number may have changed.    Medication amount left: NONE    Preferred Pharmacy:     [id number] - [place], [place], [place] - [address] [address] AT [address]    Other questions (Topic): NONE    Callback Number (Will only call for issues): [phone] (home)

## 2023-03-16 NOTE — TELEPHONE ENCOUNTER
No information noted in patient chart or media. Will defer to medical records to provide needed information as stated in below message he has reached out to medical records.

## 2023-03-17 ENCOUNTER — TELEPHONE (OUTPATIENT)
Dept: CARDIOLOGY | Facility: MEDICAL CENTER | Age: 73
End: 2023-03-17
Payer: MEDICARE

## 2023-03-17 NOTE — TELEPHONE ENCOUNTER
Renewal request received for Eliquis 5mg BID.   Eliquis Dc'd during 12/5/23 surgery and hospitalization. Called patient to discuss. Patient states he restarted Eliquis a few days after surgery and has been taking since.  Per clearance noted in media 12/5/22, dated 9/30/22, Eliquis to be restarted per surgeon recommendation.       AL: Please review and approve RX per care team. TW out of office until 3/28/23. Thank you!

## 2023-03-17 NOTE — TELEPHONE ENCOUNTER
PA started and sent to marquez Templeton Key: [id number] - PA Case ID: [id number] help? Call us at [phone]  Status  Sent to [facility]  Drug  Potassium Chloride ER 10MEQ er tablets  Form  [employer] Medicare Part D Electronic Prior Authorization Form (2017 NCPDP)

## 2023-03-17 NOTE — TELEPHONE ENCOUNTER
Noted below from AL:  Carlton Ruelas M.D.  You 12 minutes ago (9:21 AM)     Yes agree to refill. Thank you    RX refilled and sent to requested pharmacy.

## 2023-03-20 NOTE — TELEPHONE ENCOUNTER
PA not needed, medication available without authorization.    Warren Templeton Key: [id number] - PA Case ID: [id number] help? Call us at [phone]  Outcome  N/Atoday  This medication or product is on your plan's list of covered drugs. Prior authorization is not required at this time. If your pharmacy has questions regarding the processing of your prescription, please have them call the [facility] pharmacy help desk at [phone]. **Please note: This request was submitted electronically. Formulary lowering, tiering exception, cost reduction and/or pre-benefit determination review (including prospective Medicare hospice reviews) requests cannot be requested using this method of submission. Providers contact us at [phone] for further assistance.  Drug  Potassium Chloride ER 10MEQ er tablets  Form  [employer] Medicare Part D Electronic Prior Authorization Form (2017 NCPDP)

## 2023-03-23 NOTE — TELEPHONE ENCOUNTER
TW     ** Herman will not be back in his office till Tuesday the 28th**      Caller: -  Herman from [facility]     Topic/issue: Herman is calling back again and has been numerous times for the last 2 years. He is needing information for Warren, he has sent numerous letters and is getting no response  The need medication review and records. He states he needs this information to be able to continue to approve  his medications.     Callback Number: -  [phone]  Fax - [phone]

## 2023-03-27 NOTE — TELEPHONE ENCOUNTER
Warm transfer from coordinator, spoke to Herman work comp . Confirmed patients name and  with Herman. States he has attempted to get records for this patient numerous times since 2020 from our ofice and medical records and has received nothing back. Per Herman, mailed requests have been sent to our office  on the following dates: 22, 22, 10/4/22 with a follow up phone call. Confirmed address he is mailing requests to as here, [facility]. No requested noted in media for dates above. Herman states this is an active work comp case that he needs previous visit notes over the past 2+ years. Herman also states he needs these records to confirm which medications patient is on for his cardiac condition as they are currently paying for: Eliquis 5mg, Metoprolol, Potassium Chloride, Amlodipine, Valsartan.     Records to be faxed to Utilization review at [phone] or to him at [phone].    Routed to medical records/HIM staff

## 2023-03-27 NOTE — TELEPHONE ENCOUNTER
"Spoke to Herman with [employer], advised to contact Medical records per message below:  Leena Joyner; [employer] Staff 2 hours ago (1:48 PM)     We can not send out any records until we get a request from either the patient directly or his insurance company.  They can also email it to [email or web address] but I do not show we ever received one.      Herman is frustrated about not being able to get these records and wants to confirm if this patient is being seen under work comp. He states that he is going to \"assume that patient is not being seen under work comp since we do not want to give them the records\". Advised again that he would need to contact medical records for the last 2 years of notes and he stated they do not need to request records as this is a work comp case and the records should automatically be sent to them. Provided him with medical records phone number for further information. Herman also stated that if this is how we do business, then they wouldn't want to do business with us if we aren't going to send the required records.     Discussed with CAMILA Ivy supervisor    HIM team: NEIL. Thank you.      "

## 2023-03-30 NOTE — TELEPHONE ENCOUNTER
Noted below from Leena:  Leena Willson R.N.; [place] [employer] Staff  I LM on VM for sulma again and gave him all our info again and our policy at [facility].

## 2023-04-06 ENCOUNTER — TELEPHONE (OUTPATIENT)
Dept: CARDIOLOGY | Facility: MEDICAL CENTER | Age: 73
End: 2023-04-06
Payer: MEDICARE

## 2023-04-06 NOTE — TELEPHONE ENCOUNTER
TW    Caller: Warren Templeton     Medication Name and Dosage: furosemide (LASIX) 20 MG      Medication amount left: 3    [facility]     Other questions (Topic): N/A    Callback Number (Will only call for issues): [phone] (home)      Thank you,    Ernst BARRETO

## 2023-07-12 ENCOUNTER — TELEPHONE (OUTPATIENT)
Dept: CARDIOLOGY | Facility: MEDICAL CENTER | Age: 73
End: 2023-07-12
Payer: MEDICARE

## 2023-07-12 NOTE — TELEPHONE ENCOUNTER
PA started.    Warren Templeton  [place]: [id number]  help_outlineNEED HELP? [phone]    errorPharmacy claim for Eliquis 5MG tablets, prescriber Duke Figueroa, has been rejected and requires prior authorization for coverage.  Non-preferred medications may have a higher patient co-pay than the health insurance plan's preferred medications.  If clinically appropriate, you may change the prescription. A therapeutic alternative may be available. Questions on alternatives? Contact [facility] Support Center at [phone]. You can also review [employer]'s formulary online or call them directly. Compare the original medication with possible alternatives:  Drug Name  PA Requirement*   Eliquis 5MG tablets Required   Xarelto Not Required   Savaysa Required

## 2023-07-12 NOTE — TELEPHONE ENCOUNTER
Medication excluded, will call [facility].     Warren Mcnair Key: [id number] â€‘ Rx #: [id number]  Need help?Â Call us at [phone]  Outcome   Additional Information Required  Message from [employer]: Drug is not covered by plan  Drug    Eliquis 5MG tablets   Form    [employer] Electronic PA Form (2017 NCPDP)           Original Claim Info    70,75   ? CoverMyMeds Recommendation   ?  This medication may be excluded from the patient's benefit. For more information, please reach out to [employer] directly at [phone].

## 2023-07-14 ENCOUNTER — HOSPITAL ENCOUNTER (OUTPATIENT)
Dept: LAB | Facility: MEDICAL CENTER | Age: 73
End: 2023-07-14
Attending: STUDENT IN AN ORGANIZED HEALTH CARE EDUCATION/TRAINING PROGRAM
Payer: MEDICARE

## 2023-07-14 LAB
ALBUMIN SERPL BCP-MCNC: 4.4 G/DL (ref 3.2–4.9)
ALBUMIN/GLOB SERPL: 1.5 G/DL
ALP SERPL-CCNC: 70 U/L (ref 30–99)
ALT SERPL-CCNC: 14 U/L (ref 2–50)
ANION GAP SERPL CALC-SCNC: 12 MMOL/L (ref 7–16)
AST SERPL-CCNC: 12 U/L (ref 12–45)
BASOPHILS # BLD AUTO: 0.7 % (ref 0–1.8)
BASOPHILS # BLD: 0.04 K/UL (ref 0–0.12)
BILIRUB SERPL-MCNC: 0.4 MG/DL (ref 0.1–1.5)
BUN SERPL-MCNC: 18 MG/DL (ref 8–22)
CALCIUM ALBUM COR SERPL-MCNC: 9 MG/DL (ref 8.5–10.5)
CALCIUM SERPL-MCNC: 9.3 MG/DL (ref 8.5–10.5)
CHLORIDE SERPL-SCNC: 103 MMOL/L (ref 96–112)
CO2 SERPL-SCNC: 24 MMOL/L (ref 20–33)
CREAT SERPL-MCNC: 1.24 MG/DL (ref 0.5–1.4)
EOSINOPHIL # BLD AUTO: 0.13 K/UL (ref 0–0.51)
EOSINOPHIL NFR BLD: 2.3 % (ref 0–6.9)
ERYTHROCYTE [DISTWIDTH] IN BLOOD BY AUTOMATED COUNT: 50.5 FL (ref 35.9–50)
EST. AVERAGE GLUCOSE BLD GHB EST-MCNC: 134 MG/DL
GFR SERPLBLD CREATININE-BSD FMLA CKD-EPI: 62 ML/MIN/1.73 M 2
GLOBULIN SER CALC-MCNC: 3 G/DL (ref 1.9–3.5)
GLUCOSE SERPL-MCNC: 139 MG/DL (ref 65–99)
HBA1C MFR BLD: 6.3 % (ref 4–5.6)
HCT VFR BLD AUTO: 41.7 % (ref 42–52)
HGB BLD-MCNC: 13.5 G/DL (ref 14–18)
IMM GRANULOCYTES # BLD AUTO: 0.02 K/UL (ref 0–0.11)
IMM GRANULOCYTES NFR BLD AUTO: 0.4 % (ref 0–0.9)
LYMPHOCYTES # BLD AUTO: 1.91 K/UL (ref 1–4.8)
LYMPHOCYTES NFR BLD: 34.5 % (ref 22–41)
MCH RBC QN AUTO: 28.4 PG (ref 27–33)
MCHC RBC AUTO-ENTMCNC: 32.4 G/DL (ref 32.3–36.5)
MCV RBC AUTO: 87.8 FL (ref 81.4–97.8)
MONOCYTES # BLD AUTO: 0.54 K/UL (ref 0–0.85)
MONOCYTES NFR BLD AUTO: 9.7 % (ref 0–13.4)
NEUTROPHILS # BLD AUTO: 2.9 K/UL (ref 1.82–7.42)
NEUTROPHILS NFR BLD: 52.4 % (ref 44–72)
NRBC # BLD AUTO: 0 K/UL
NRBC BLD-RTO: 0 /100 WBC (ref 0–0.2)
PLATELET # BLD AUTO: 275 K/UL (ref 164–446)
PMV BLD AUTO: 10.6 FL (ref 9–12.9)
POTASSIUM SERPL-SCNC: 4.1 MMOL/L (ref 3.6–5.5)
PROT SERPL-MCNC: 7.4 G/DL (ref 6–8.2)
RBC # BLD AUTO: 4.75 M/UL (ref 4.7–6.1)
SODIUM SERPL-SCNC: 139 MMOL/L (ref 135–145)
WBC # BLD AUTO: 5.5 K/UL (ref 4.8–10.8)

## 2023-07-14 PROCEDURE — 84439 ASSAY OF FREE THYROXINE: CPT

## 2023-07-14 PROCEDURE — 84443 ASSAY THYROID STIM HORMONE: CPT

## 2023-07-14 PROCEDURE — 36415 COLL VENOUS BLD VENIPUNCTURE: CPT

## 2023-07-14 PROCEDURE — 82728 ASSAY OF FERRITIN: CPT

## 2023-07-14 PROCEDURE — 82607 VITAMIN B-12: CPT

## 2023-07-14 PROCEDURE — 83036 HEMOGLOBIN GLYCOSYLATED A1C: CPT | Mod: GA

## 2023-07-14 PROCEDURE — 85025 COMPLETE CBC W/AUTO DIFF WBC: CPT

## 2023-07-14 PROCEDURE — 82746 ASSAY OF FOLIC ACID SERUM: CPT

## 2023-07-14 PROCEDURE — 80053 COMPREHEN METABOLIC PANEL: CPT

## 2023-07-15 LAB
FERRITIN SERPL-MCNC: 155 NG/ML (ref 22–322)
FOLATE SERPL-MCNC: >40 NG/ML
T4 FREE SERPL-MCNC: 1.14 NG/DL (ref 0.93–1.7)
TSH SERPL DL<=0.005 MIU/L-ACNC: 1.75 UIU/ML (ref 0.38–5.33)
VIT B12 SERPL-MCNC: 624 PG/ML (ref 211–911)

## 2023-07-18 ENCOUNTER — TELEPHONE (OUTPATIENT)
Dept: CARDIOLOGY | Facility: MEDICAL CENTER | Age: 73
End: 2023-07-18

## 2023-07-18 DIAGNOSIS — I11.9 BENIGN HYPERTENSIVE HEART DISEASE WITHOUT HEART FAILURE: ICD-10-CM

## 2023-07-18 DIAGNOSIS — I51.89 DIASTOLIC DYSFUNCTION: ICD-10-CM

## 2023-07-18 DIAGNOSIS — I48.0 PAROXYSMAL ATRIAL FIBRILLATION (HCC): ICD-10-CM

## 2023-07-18 NOTE — TELEPHONE ENCOUNTER
TW    Caller: Warren Templeton    Medication Name and Dosage:  potassium chloride SA (K-DUR) 10 MEQ Tab CR    Medication amount left: A couple of weeks worth    Preferred Pharmacy:   [id number] - [place], [place] - [address] AT [address] [address] [address] [facility] [facility] [address]   Phone: [phone]  Fax: [phone]    Other questions (Topic): N/A    Callback Number (Will only call for issues): [phone]    Thank you,  -Abril AVALOS

## 2023-07-19 NOTE — TELEPHONE ENCOUNTER
New PA started, pt does not have [employer].     The following medications may be covered  If clinically appropriate, you may change the prescription. A therapeutic alternative may be available. Questions on alternatives? We're here to help. Contact [facility] Support Center at [phone]. You can also review [employer]'s formulary online or call them directly. Compare the original medication with possible alternatives:    MEDICATION PA REQUIREMENT  ELIQUIS 5MG TABLETS Not Required  XARELTO 15 MG & 20 MG Not Required  SAVAYSA Required

## 2023-07-19 NOTE — TELEPHONE ENCOUNTER
PA sent to zain Templeton Key: [id number] - PA Case ID: [id number] help? Call us at [phone]  Status  [facility]  Drug  Eliquis 5MG tablets  Form  [employer] Medicare Part D Electronic Prior Authorization Form (2017 NCPDP)

## 2023-07-21 RX ORDER — POTASSIUM CHLORIDE 750 MG/1
20 TABLET, EXTENDED RELEASE ORAL
Qty: 180 TABLET | Refills: 0 | Status: CANCELLED | OUTPATIENT
Start: 2023-07-21

## 2023-07-21 NOTE — TELEPHONE ENCOUNTER
Is the patient due for a refill? Yes    Was the patient seen the past year? Yes    Date of last office visit: 10/12/22    Does the patient have an upcoming appointment?  No    Provider to refill:TW    Does the patients insurance require a 100 day supply?  No

## 2023-07-21 NOTE — TELEPHONE ENCOUNTER
PA not needed, medication available without authorization.    Warren Templeton Key: [id number] - PA Case ID: [id number] help? Call us at [phone]  Outcome  N/Atoday  This medication or product is on your plan's list of covered drugs. Prior authorization is not required at this time. If your pharmacy has questions regarding the processing of your prescription, please have them call the [facility] pharmacy help desk at [phone]. **Please note: This request was submitted electronically. Formulary lowering, tiering exception, cost reduction and/or pre-benefit determination review (including prospective Medicare hospice reviews) requests cannot be requested using this method of submission. Providers contact us at [phone] for further assistance.  Drug  Eliquis 5MG tablets  Form  [employer] Medicare Part D Electronic Prior Authorization Form (2017 NCPDP)

## 2023-07-27 DIAGNOSIS — I51.89 DIASTOLIC DYSFUNCTION: ICD-10-CM

## 2023-07-27 DIAGNOSIS — I11.9 BENIGN HYPERTENSIVE HEART DISEASE WITHOUT HEART FAILURE: ICD-10-CM

## 2023-07-27 DIAGNOSIS — I48.0 PAROXYSMAL ATRIAL FIBRILLATION (HCC): ICD-10-CM

## 2023-07-27 NOTE — TELEPHONE ENCOUNTER
TW    Caller: Warren Templeton    Medication Name and Dosage:  potassium chloride SA (K-DUR) 10 MEQ Tab CR [[id number]]    Please call your pharmacy and have them send us a refill request or speak to a live representative, RX number may have changed.    Medication amount left: N/A    Preferred Pharmacy: [facility] on file     Other questions (Topic): See encounter from 7/18/23. Patient states pharmacy does not have prescription. Please advise.     Callback Number (Will only call for issues): [phone] (home)     Thank you,   Bryanna SPEARS

## 2023-07-28 RX ORDER — POTASSIUM CHLORIDE 750 MG/1
20 TABLET, EXTENDED RELEASE ORAL
Qty: 180 TABLET | Refills: 0 | Status: SHIPPED | OUTPATIENT
Start: 2023-07-28 | End: 2023-11-10 | Stop reason: SDUPTHER

## 2023-07-28 NOTE — TELEPHONE ENCOUNTER
Is the patient due for a refill? Yes    Was the patient seen the past year? Yes    Date of last office visit: 10/12/2022    Does the patient have an upcoming appointment?  No   If yes, When?     Provider to refill:TW    Does the patients insurance require a 100 day supply?  No

## 2023-09-19 DIAGNOSIS — I48.0 PAROXYSMAL ATRIAL FIBRILLATION (HCC): ICD-10-CM

## 2023-09-20 ENCOUNTER — TELEPHONE (OUTPATIENT)
Dept: SCHEDULING | Facility: IMAGING CENTER | Age: 73
End: 2023-09-20
Payer: MEDICARE

## 2023-09-20 DIAGNOSIS — I11.9 BENIGN HYPERTENSIVE HEART DISEASE WITHOUT HEART FAILURE: ICD-10-CM

## 2023-09-20 DIAGNOSIS — I48.0 PAROXYSMAL ATRIAL FIBRILLATION (HCC): ICD-10-CM

## 2023-09-20 DIAGNOSIS — Z79.899 ENCOUNTER FOR MONITORING DIURETIC THERAPY: ICD-10-CM

## 2023-09-20 DIAGNOSIS — Z51.81 ENCOUNTER FOR MONITORING DIURETIC THERAPY: ICD-10-CM

## 2023-09-20 NOTE — TELEPHONE ENCOUNTER
TW        Caller: Warren Templeton      Topic/issue: Patient was calling to see if there was any imaging or blood work that he needed to complete before his appointment in november      Callback Number: [phone]      Thank you    -Maximus AVALOS

## 2023-09-20 NOTE — TELEPHONE ENCOUNTER
Lipid profile and magnesium ordered per routine monitoring & medication refill protocol.    Phone Number Called: [phone]    Call outcome: Spoke to patient regarding message below.    Message: Called to discuss need for Echo to be scheduled at [phone] & to get lab work done prior to appointment. Pt appreciative of phone call

## 2023-09-27 ENCOUNTER — HOSPITAL ENCOUNTER (OUTPATIENT)
Dept: CARDIOLOGY | Facility: MEDICAL CENTER | Age: 73
End: 2023-09-27
Attending: INTERNAL MEDICINE
Payer: MEDICARE

## 2023-09-27 DIAGNOSIS — I71.21 ANEURYSM OF ASCENDING AORTA WITHOUT RUPTURE (HCC): ICD-10-CM

## 2023-09-27 PROCEDURE — 93306 TTE W/DOPPLER COMPLETE: CPT

## 2023-09-28 LAB — LV EJECT FRACT  99904: 65

## 2023-09-28 PROCEDURE — 93306 TTE W/DOPPLER COMPLETE: CPT | Mod: 26 | Performed by: INTERNAL MEDICINE

## 2023-10-02 ENCOUNTER — TELEPHONE (OUTPATIENT)
Dept: CARDIOLOGY | Facility: MEDICAL CENTER | Age: 73
End: 2023-10-02
Payer: MEDICARE

## 2023-10-02 DIAGNOSIS — I48.0 PAROXYSMAL ATRIAL FIBRILLATION (HCC): ICD-10-CM

## 2023-10-02 DIAGNOSIS — I34.0 NONRHEUMATIC MITRAL VALVE REGURGITATION: ICD-10-CM

## 2023-10-02 DIAGNOSIS — I11.9 BENIGN HYPERTENSIVE HEART DISEASE WITHOUT HEART FAILURE: ICD-10-CM

## 2023-10-02 DIAGNOSIS — I51.89 DIASTOLIC DYSFUNCTION: ICD-10-CM

## 2023-10-02 RX ORDER — FUROSEMIDE 20 MG/1
20 TABLET ORAL
Qty: 90 TABLET | Refills: 0 | Status: SHIPPED | OUTPATIENT
Start: 2023-10-02 | End: 2023-11-10 | Stop reason: SDUPTHER

## 2023-10-02 RX ORDER — METOPROLOL SUCCINATE 50 MG/1
50 TABLET, EXTENDED RELEASE ORAL 2 TIMES DAILY
Qty: 180 TABLET | Refills: 0 | Status: SHIPPED | OUTPATIENT
Start: 2023-10-02 | End: 2023-11-10 | Stop reason: SDUPTHER

## 2023-10-02 RX ORDER — AMLODIPINE BESYLATE 10 MG/1
10 TABLET ORAL
Qty: 90 TABLET | Refills: 0 | Status: SHIPPED | OUTPATIENT
Start: 2023-10-02 | End: 2023-11-10 | Stop reason: SDUPTHER

## 2023-10-02 RX ORDER — VALSARTAN 160 MG/1
160 TABLET ORAL 2 TIMES DAILY
Qty: 180 TABLET | Refills: 0 | Status: SHIPPED | OUTPATIENT
Start: 2023-10-02 | End: 2023-11-10 | Stop reason: SDUPTHER

## 2023-10-02 NOTE — TELEPHONE ENCOUNTER
TW    Caller: Blanca    Medication Name and Dosage:    metoprolol SR (TOPROL XL) 50 MG TABLET SR 24 HR [[id number]]    amLODIPine (NORVASC) 10 MG Tab [[id number]]    valsartan (DIOVAN) 160 MG Tab [[id number]]    furosemide (LASIX) 20 MG Tab [[id number]]    Medication amount left: 0    [facility]    Other questions (Topic): N/a    Callback Number (Will only call for issues): [phone]    Thank you,   Susana CHAN

## 2023-10-17 ENCOUNTER — HOSPITAL ENCOUNTER (OUTPATIENT)
Dept: LAB | Facility: MEDICAL CENTER | Age: 73
End: 2023-10-17
Attending: INTERNAL MEDICINE
Payer: MEDICARE

## 2023-10-17 DIAGNOSIS — Z51.81 ENCOUNTER FOR MONITORING DIURETIC THERAPY: ICD-10-CM

## 2023-10-17 DIAGNOSIS — I11.9 BENIGN HYPERTENSIVE HEART DISEASE WITHOUT HEART FAILURE: ICD-10-CM

## 2023-10-17 DIAGNOSIS — I48.0 PAROXYSMAL ATRIAL FIBRILLATION (HCC): ICD-10-CM

## 2023-10-17 DIAGNOSIS — Z79.899 ENCOUNTER FOR MONITORING DIURETIC THERAPY: ICD-10-CM

## 2023-10-17 LAB
CHOLEST SERPL-MCNC: 168 MG/DL (ref 100–199)
HDLC SERPL-MCNC: 39 MG/DL
LDLC SERPL CALC-MCNC: 109 MG/DL
MAGNESIUM SERPL-MCNC: 2 MG/DL (ref 1.5–2.5)
TRIGL SERPL-MCNC: 100 MG/DL (ref 0–149)

## 2023-10-17 PROCEDURE — 80061 LIPID PANEL: CPT

## 2023-10-17 PROCEDURE — 36415 COLL VENOUS BLD VENIPUNCTURE: CPT

## 2023-10-17 PROCEDURE — 83735 ASSAY OF MAGNESIUM: CPT

## 2023-10-30 ENCOUNTER — TELEPHONE (OUTPATIENT)
Dept: CARDIOLOGY | Facility: MEDICAL CENTER | Age: 73
End: 2023-10-30
Payer: MEDICARE

## 2023-10-30 NOTE — TELEPHONE ENCOUNTER
"Last OV: 10/12/2022  Proposed Surgery: Cystoscopy, with Bilateral Ureteroscopy, with Lithotripsy, with Insertion of Bilateral Ureteral Stent   Surgery Date: 11/08/2023  Requesting Office Name: Miles Dominguez   Fax Number: [phone]  Preference of Location (default is surgery center unless specified by Cardiologist or [facility])  Prior Clearance Addressed: No      Anticoags/Antiplatelets: Apixaban   Outstanding Cardiac Imaging : No  Stent, Cardiac Devices, or Catheterization: Yes  Date : 02/12/2018   Ablation, TAVR/Valve (including open heart), Cardioversion: Yes  Date: 01/18/2018  >3 months post procedure for dental work request OR >6 months post procedure, send clearance letter  Recent Cardiac Hospitalization: No            When: N/A  History (cardiac history):   Past Medical History:   Diagnosis Date    Anesthesia 11/16/2022    \"light weight\"    Arthritis 11/16/2022    all over    Benign hypertensive heart disease without heart failure     Disabling from his career as a , occupation related.     Bowel habit changes 11/16/2022    fecal incontinence at times    Cataract 11/16/2022    IOLOU    Chest pain, unspecified     ~2000 with negative cardiac cath by [facility]    Chronic anticoagulation 11/11/2016    Claustrophobia 11/16/2022    severe claustrophobia    Cold 01/14/2018    Dental disorder 11/16/2022    upper front three teeth capped    Diastolic dysfunction     Dizziness 11/16/2022    history of vertigo, and still has problems with dizziness    Head ache     Headache, chronic daily     Heart burn 11/16/2022    medicated    Hemorrhagic disorder (HCC) 11/16/2022    Eliquis    Hypertension 11/16/2022    medicated    Indigestion 11/16/2022    medicated    MR (mitral regurgitation)     Osteoarthritis     Pain 11/16/2022    low back pain, with right leg involvement to toes, with numbness, burning    Paroxysmal atrial fibrillation (HCC) 11/16/2022    had ablation 2019, on Eliquis    Pure hypercholesterolemia "     [facility]    RLS (restless legs syndrome)     Sleep apnea 11/16/2022    uses a CPAP    Urinary incontinence 11/16/2022    at times             Surgical Clearance Letter Sent: YES   **Scan clearance request letter into [employer].**

## 2023-10-30 NOTE — LETTER
PROCEDURE/SURGERY CLEARANCE FORM      Encounter Date: 10/30/2023    Patient: Warren Templeton  YOB: 1950    CARDIOLOGIST:  Ayush Ya M.D.    REFERRING DOCTOR:  No ref. provider found      The above patient is cleared to have the following procedure/surgery: Cystoscopy, with Bilateral Ureteroscopy, with Lithotripsy, with Insertion of Bilateral Ureteral Stent     PROCEDURE/SURGERY CLEARANCE FORM    Date: 10/30/2023   Patient Name: Warren Templeton    Dear Surgeon or Proceduralist,      Thank you for your request for cardiac stratification of our mutual patient Warren Templeton 1950. We have reviewed their [facility] records; and to the best of our understanding this patient has not had stenting, ablation, cardiothoracic surgery or hospitalization for cardiovascular reasons in the past 6 months.  Warren Templeton has been seen within the past 18 months and is considered to have non-modifiable cardiac risk for this low-risk procedure/surgery. They may proceed from a cardiovascular standpoint and may hold their antiplatelet/anticoagulation as briefly as possible. Please have patient resume this medication when hemodynamically stable to do so.     Aspirin or Prasugrel   - hold 7 days prior to procedure/surgery, resume when hemodynamically stable      Clopidrogrel or Ticagrelor  - hold 7 days for all neurological procedures, hold 5 days prior to all other procedure/surgery,  resume when hemodynamically stable     Warfarin - hold 7 days for all neurological procedures, hold 5 days prior to all other procedure/surgery and coordinate with [facility] ([phone]) INR testing and dose management.      Pradaxa/Xarelto/Eliquis/Savesya - hold 1 day prior to procedure for low bleeding risk procedure, 2 days for high bleeding risk procedure, or consider holding 3 days or longer for patients with reduced kidney function (CrCl <30mL/min) or spinal/cranial  surgeries/procedures.      If they have a mechanical heart valve, please coordinate with [facility] ([phone]) the proper management of their anticoagulant in the periprocedural or perioperative period.      Some patients have higher risk for cardiovascular complications or holding medication. If our patient has had prior complications of holding antiplatelet or anticoagulants in the past and we have seen them after these events, we have addressed these concerns with the patient. They are at an unknown degree of increased risk for recurrent complication.  You may hold anticoagulation/antiplatelets for the procedure or surgery if the benefits of the procedure or surgery outweigh this nonmodifiable risk.      If Warren Templeton 1950 has new symptoms of heart failure decompensation, unstable arrythmia, or angina please reach out and we will assess the patient.      If you have other patient-specific concerns, please feel free to reach out to the patient's cardiologist directly at [phone].     Thank you,       [facility]

## 2023-11-10 ENCOUNTER — OFFICE VISIT (OUTPATIENT)
Dept: CARDIOLOGY | Facility: MEDICAL CENTER | Age: 73
End: 2023-11-10
Attending: NURSE PRACTITIONER
Payer: COMMERCIAL

## 2023-11-10 VITALS
BODY MASS INDEX: 36.96 KG/M2 | OXYGEN SATURATION: 94 % | DIASTOLIC BLOOD PRESSURE: 80 MMHG | SYSTOLIC BLOOD PRESSURE: 116 MMHG | RESPIRATION RATE: 16 BRPM | WEIGHT: 264 LBS | HEIGHT: 71 IN | HEART RATE: 71 BPM

## 2023-11-10 DIAGNOSIS — I51.89 DIASTOLIC DYSFUNCTION: ICD-10-CM

## 2023-11-10 DIAGNOSIS — I34.0 NONRHEUMATIC MITRAL VALVE REGURGITATION: ICD-10-CM

## 2023-11-10 DIAGNOSIS — I11.9 BENIGN HYPERTENSIVE HEART DISEASE WITHOUT HEART FAILURE: ICD-10-CM

## 2023-11-10 DIAGNOSIS — I48.0 PAROXYSMAL ATRIAL FIBRILLATION (HCC): ICD-10-CM

## 2023-11-10 PROCEDURE — 3074F SYST BP LT 130 MM HG: CPT | Performed by: NURSE PRACTITIONER

## 2023-11-10 PROCEDURE — 3079F DIAST BP 80-89 MM HG: CPT | Performed by: NURSE PRACTITIONER

## 2023-11-10 PROCEDURE — 99214 OFFICE O/P EST MOD 30 MIN: CPT | Performed by: NURSE PRACTITIONER

## 2023-11-10 PROCEDURE — 99213 OFFICE O/P EST LOW 20 MIN: CPT | Performed by: NURSE PRACTITIONER

## 2023-11-10 RX ORDER — FUROSEMIDE 20 MG/1
20 TABLET ORAL
Qty: 90 TABLET | Refills: 3 | Status: SHIPPED | OUTPATIENT
Start: 2023-11-10

## 2023-11-10 RX ORDER — AMLODIPINE BESYLATE 10 MG/1
10 TABLET ORAL
Qty: 90 TABLET | Refills: 3 | Status: SHIPPED | OUTPATIENT
Start: 2023-11-10

## 2023-11-10 RX ORDER — POTASSIUM CHLORIDE 750 MG/1
20 TABLET, EXTENDED RELEASE ORAL
Qty: 180 TABLET | Refills: 3 | Status: SHIPPED | OUTPATIENT
Start: 2023-11-10

## 2023-11-10 RX ORDER — VALSARTAN 160 MG/1
160 TABLET ORAL 2 TIMES DAILY
Qty: 180 TABLET | Refills: 3 | Status: SHIPPED | OUTPATIENT
Start: 2023-11-10

## 2023-11-10 RX ORDER — METOPROLOL SUCCINATE 50 MG/1
50 TABLET, EXTENDED RELEASE ORAL 2 TIMES DAILY
Qty: 180 TABLET | Refills: 3 | Status: SHIPPED | OUTPATIENT
Start: 2023-11-10

## 2023-11-10 ASSESSMENT — FIBROSIS 4 INDEX: FIB4 SCORE: 0.83

## 2023-11-10 NOTE — PROGRESS NOTES
"Chief Complaint   Patient presents with    Atrial Fibrillation     F/V Dx: Paroxysmal atrial fibrillation (HCC)    Hyperlipidemia     F/V Dx: Hypercholesterolemia       Subjective     Warren Templeton is a 73 y.o. male who presents today for follow up PAF    He is followed by Dr. Ya in our clinic, history of PAF s/p ablation with PVI Dr. Reddy 2/2018. JIGNA and ascending aortic root aneurysm.    Overall, patient is doing well. No cardiovascular complaints. Need medication refill.     Past Medical History:   Diagnosis Date    Anesthesia 11/16/2022    \"light weight\"    Arthritis 11/16/2022    all over    Benign hypertensive heart disease without heart failure     Disabling from his career as a , occupation related.     Bowel habit changes 11/16/2022    fecal incontinence at times    Cataract 11/16/2022    IOLOU    Chest pain, unspecified     ~[facility]    Chronic anticoagulation 11/11/2016    Claustrophobia 11/16/2022    severe claustrophobia    Cold 01/14/2018    Dental disorder 11/16/2022    upper front three teeth capped    Diastolic dysfunction     Dizziness 11/16/2022    history of vertigo, and still has problems with dizziness    Head ache     Headache, chronic daily     Heart burn 11/16/2022    medicated    Hemorrhagic disorder (HCC) 11/16/2022    Eliquis    Hypertension 11/16/2022    medicated    Indigestion 11/16/2022    medicated    MR (mitral regurgitation)     Osteoarthritis     Pain 11/16/2022    low back pain, with right leg involvement to toes, with numbness, burning    Paroxysmal atrial fibrillation (HCC) 11/16/2022    had ablation 2019, on Eliquis    Pure hypercholesterolemia     [facility]    RLS (restless legs syndrome)     Sleep apnea 11/16/2022    uses a CPAP    Urinary incontinence 11/16/2022    at times     Past Surgical History:   Procedure Laterality Date    LUMBAR FUSION O-ARM N/A 12/5/2022    Procedure: POSTERIOR L1-4 " DISCECTOMY INTERBODY GRAFT, BILATERAL PEDICLE SCREWS L1-4 WITH OARM;  Surgeon: Johnny Acuna M.D.;  Location: SURGERY [facility];  Service: Neurosurgery    LUMBAR LAMINECTOMY DISKECTOMY N/A 2022    Procedure: LAMINECTOMY, SPINE, LUMBAR, WITH DISCECTOMY;  Surgeon: Johnny Acuna M.D.;  Location: SURGERY [facility];  Service: Neurosurgery    CATARACT EXTRACTION WITH IOL  2020    CERVICAL DISK AND FUSION ANTERIOR      2 level    GASTRIC BYPASS LAPAROSCOPIC      With Daron-en-Y    KNEE ARTHROPLASTY TOTAL Bilateral     OTHER ORTHOPEDIC SURGERY   note;    Bilateral, with musculoskelatal limitations     Family History   Problem Relation Age of Onset    Hypertension Mother     Cancer Mother         uterine, leukemia, skin    Cancer Father         Esophageal    Cancer Brother         Brain    Hypertension Brother      Social History     Socioeconomic History    Marital status:      Spouse name: Not on file    Number of children: Not on file    Years of education: Not on file    Highest education level: Not on file   Occupational History    Not on file   Tobacco Use    Smoking status: Former     Types: Cigars     Quit date:      Years since quittin.8    Smokeless tobacco: Never   Vaping Use    Vaping Use: Never used   Substance and Sexual Activity    Alcohol use: Yes     Alcohol/week: 1.2 oz     Types: 2 Cans of beer per week     Comment: 2-3X WEEK    Drug use: No     Comment: CBD gummies, hasn't had times 1-2 months    Sexual activity: Yes     Partners: Female     Comment: Ashlee    Other Topics Concern    Not on file   Social History Narrative    Not on file     Social Determinants of Health     Financial Resource Strain: Not on file   Food Insecurity: Not on file   Transportation Needs: Not on file   Physical Activity: Not on file   Stress: Not on file   Social Connections: Not on file   Intimate Partner Violence: Not on file   Housing Stability: Not on file     Allergies   Allergen  Reactions    Magnesium Oxide Rash     Sores on scalp, rash on head&body, itchy anus     Outpatient Encounter Medications as of 11/10/2023   Medication Sig Dispense Refill    amLODIPine (NORVASC) 10 MG Tab Take 1 Tablet by mouth every day. 90 Tablet 3    apixaban (ELIQUIS) 5mg Tab Take 1 Tablet by mouth 2 times a day. 180 Tablet 3    furosemide (LASIX) 20 MG Tab Take 1 Tablet by mouth every day. 90 Tablet 3    metoprolol SR (TOPROL XL) 50 MG TABLET SR 24 HR Take 1 Tablet by mouth 2 times a day. 180 Tablet 3    potassium chloride SA (K-DUR) 10 MEQ Tab CR Take 2 Tablets by mouth every day. 180 Tablet 3    valsartan (DIOVAN) 160 MG Tab Take 1 Tablet by mouth 2 times a day. 180 Tablet 3    acetaminophen (TYLENOL) 500 MG Tab Take 2 Tablets by mouth every 6 hours as needed for Mild Pain or Moderate Pain.      baclofen (LIORESAL) 10 MG Tab Take 1 Tablet by mouth 3 times a day.      meclizine (ANTIVERT) 12.5 MG Tab TAKE 1 TABLET BY MOUTH THREE TIMES DAILY AS NEEDED (Patient taking differently: Take 12.5 mg by mouth 3 times a day as needed.) 30 tablet 1    fluticasone (FLONASE) 50 MCG/ACT nasal spray Administer 1 spray into each nostril twice daily for 2 weeks, then daily thereafter. (Patient taking differently: Administer 1 Spray into affected nostril(S) as needed.) 16 g 3    fluocinonide (LIDEX) 0.05 % external solution Apply 0.05 Applicators to affected area(s) every day.  3    omeprazole (PRILOSEC) 20 MG CPDR Take 1 Capsule by mouth every 48 hours.      [DISCONTINUED] metoprolol SR (TOPROL XL) 50 MG TABLET SR 24 HR Take 1 Tablet by mouth 2 times a day. 180 Tablet 0    [DISCONTINUED] amLODIPine (NORVASC) 10 MG Tab Take 1 Tablet by mouth every day. 90 Tablet 0    [DISCONTINUED] valsartan (DIOVAN) 160 MG Tab Take 1 Tablet by mouth 2 times a day. 180 Tablet 0    [DISCONTINUED] furosemide (LASIX) 20 MG Tab Take 1 Tablet by mouth every day. 90 Tablet 0    [DISCONTINUED] apixaban (ELIQUIS) 5mg Tab Take 1 Tablet by mouth 2 times  "a day. PLEASE CALL [phone] TO SCHEDULE A FOLLOW UP VISIT WITH YOUR CARDIOLOGY PROVIDER TO ENSURE FURTHER REFILLS. 180 Tablet 0    [DISCONTINUED] potassium chloride SA (K-DUR) 10 MEQ Tab CR Take 2 Tablets by mouth every day. 180 Tablet 0    [DISCONTINUED] Ferrous Sulfate (IRON PO) Take 1 Tablet by mouth every 3 days. Takes Tuesday, Thursday, Sunday (Patient not taking: Reported on 11/10/2023)       No facility-administered encounter medications on file as of 11/10/2023.     Review of Systems   Constitutional:  Negative for malaise/fatigue.   Eyes:  Negative for blurred vision and double vision.   Respiratory:  Negative for cough, shortness of breath and wheezing.    Cardiovascular:  Negative for chest pain, palpitations, orthopnea and leg swelling.   Musculoskeletal:  Negative for falls.   Neurological:  Negative for dizziness, focal weakness, loss of consciousness, weakness and headaches.   All other systems reviewed and are negative.             Objective     /80 (BP Location: Left arm, Patient Position: Sitting, BP Cuff Size: Adult)   Pulse 71   Resp 16   Ht 1.803 m (5' 11\")   Wt 120 kg (264 lb)   SpO2 94%   BMI 36.82 kg/m²     Physical Exam  Constitutional:       General: He is not in acute distress.     Appearance: He is well-developed. He is not diaphoretic.   HENT:      Head: Normocephalic and atraumatic.   Eyes:      Pupils: Pupils are equal, round, and reactive to light.   Neck:      Vascular: No JVD.   Cardiovascular:      Rate and Rhythm: Normal rate and regular rhythm.      Heart sounds: Normal heart sounds.   Pulmonary:      Effort: Pulmonary effort is normal.      Breath sounds: Normal breath sounds.   Abdominal:      General: Bowel sounds are normal. There is no distension.      Palpations: Abdomen is soft.   Musculoskeletal:      Right lower leg: Edema present.      Left lower leg: Edema present.   Skin:     General: Skin is warm and dry.   Neurological:      Mental Status: He is " alert and oriented to person, place, and time.   Psychiatric:         Behavior: Behavior normal.         Thought Content: Thought content normal.         Judgment: Judgment normal.              ECHO  9/27/2023  Normal left ventricular chamber size.  The left ventricular ejection fraction is visually estimated to be 65%.  Normal right ventricular size and systolic function.  Normal inferior vena cava size and inspiratory collapse.  Mild mitral regurgitation.      Assessment & Plan     1. Benign hypertensive heart disease without heart failure  amLODIPine (NORVASC) 10 MG Tab    furosemide (LASIX) 20 MG Tab    metoprolol SR (TOPROL XL) 50 MG TABLET SR 24 HR    potassium chloride SA (K-DUR) 10 MEQ Tab CR    valsartan (DIOVAN) 160 MG Tab      2. Paroxysmal atrial fibrillation (HCC)  apixaban (ELIQUIS) 5mg Tab    metoprolol SR (TOPROL XL) 50 MG TABLET SR 24 HR    potassium chloride SA (K-DUR) 10 MEQ Tab CR    valsartan (DIOVAN) 160 MG Tab      3. Diastolic dysfunction  amLODIPine (NORVASC) 10 MG Tab    furosemide (LASIX) 20 MG Tab    metoprolol SR (TOPROL XL) 50 MG TABLET SR 24 HR    potassium chloride SA (K-DUR) 10 MEQ Tab CR    valsartan (DIOVAN) 160 MG Tab      4. Nonrheumatic mitral valve regurgitation  furosemide (LASIX) 20 MG Tab          Medical Decision Making: Today's Assessment/Status/Plan:        Paroxysmal atrial fibrillation   - pulse is regular today   - continue metoprolol XL 50mg BID   - continue apixaban 5mg BID   - ECHO showed ef 65%    2. Hypertension   - stable   - continue valsartan 160mg BID, metoprolol Xl 50mg BID, furosemide 20mg qd, and amlodipine 10mg qd     3. Sleep apnea:  - on cpap     Follow up in 1 year, sooner as needed.

## 2023-11-11 ASSESSMENT — ENCOUNTER SYMPTOMS
BLURRED VISION: 0
DIZZINESS: 0
HEADACHES: 0
SHORTNESS OF BREATH: 0
COUGH: 0
LOSS OF CONSCIOUSNESS: 0
FALLS: 0
PALPITATIONS: 0
WHEEZING: 0
DOUBLE VISION: 0
ORTHOPNEA: 0
WEAKNESS: 0
FOCAL WEAKNESS: 0

## 2024-07-18 ENCOUNTER — HOSPITAL ENCOUNTER (OUTPATIENT)
Dept: RADIOLOGY | Facility: MEDICAL CENTER | Age: 74
End: 2024-07-18
Attending: STUDENT IN AN ORGANIZED HEALTH CARE EDUCATION/TRAINING PROGRAM
Payer: MEDICARE

## 2024-07-18 ENCOUNTER — HOSPITAL ENCOUNTER (OUTPATIENT)
Dept: LAB | Facility: MEDICAL CENTER | Age: 74
End: 2024-07-18
Attending: STUDENT IN AN ORGANIZED HEALTH CARE EDUCATION/TRAINING PROGRAM
Payer: MEDICARE

## 2024-07-18 DIAGNOSIS — N28.1 ACQUIRED CYST OF KIDNEY: ICD-10-CM

## 2024-07-18 LAB
BUN SERPL-MCNC: 18 MG/DL (ref 8–22)
CREAT SERPL-MCNC: 1.12 MG/DL (ref 0.5–1.4)
GFR SERPLBLD CREATININE-BSD FMLA CKD-EPI: 70 ML/MIN/1.73 M 2

## 2024-07-18 PROCEDURE — 82565 ASSAY OF CREATININE: CPT

## 2024-07-18 PROCEDURE — 36415 COLL VENOUS BLD VENIPUNCTURE: CPT

## 2024-07-18 PROCEDURE — 74178 CT ABD&PLV WO CNTR FLWD CNTR: CPT

## 2024-07-18 PROCEDURE — 84520 ASSAY OF UREA NITROGEN: CPT

## 2024-07-18 PROCEDURE — 700117 HCHG RX CONTRAST REV CODE 255: Performed by: STUDENT IN AN ORGANIZED HEALTH CARE EDUCATION/TRAINING PROGRAM

## 2024-07-18 RX ADMIN — IOHEXOL 100 ML: 350 INJECTION, SOLUTION INTRAVENOUS at 18:53

## 2024-09-16 DIAGNOSIS — Z51.81 ENCOUNTER FOR MONITORING DIURETIC THERAPY: ICD-10-CM

## 2024-09-16 DIAGNOSIS — Z79.899 ENCOUNTER FOR MONITORING DIURETIC THERAPY: ICD-10-CM

## 2024-09-16 DIAGNOSIS — I51.89 DIASTOLIC DYSFUNCTION: ICD-10-CM

## 2024-09-16 DIAGNOSIS — I34.0 NONRHEUMATIC MITRAL VALVE REGURGITATION: ICD-10-CM

## 2024-09-16 DIAGNOSIS — I11.9 BENIGN HYPERTENSIVE HEART DISEASE WITHOUT HEART FAILURE: ICD-10-CM

## 2024-09-16 RX ORDER — FUROSEMIDE 20 MG
20 TABLET ORAL
Qty: 90 TABLET | Refills: 0 | Status: SHIPPED | OUTPATIENT
Start: 2024-09-16

## 2024-09-20 DIAGNOSIS — I48.0 PAROXYSMAL ATRIAL FIBRILLATION (HCC): ICD-10-CM

## 2024-09-20 DIAGNOSIS — I11.9 BENIGN HYPERTENSIVE HEART DISEASE WITHOUT HEART FAILURE: ICD-10-CM

## 2024-09-20 DIAGNOSIS — I51.89 DIASTOLIC DYSFUNCTION: ICD-10-CM

## 2024-09-20 RX ORDER — POTASSIUM CHLORIDE 750 MG/1
20 TABLET, EXTENDED RELEASE ORAL
Qty: 180 TABLET | Refills: 0 | Status: SHIPPED | OUTPATIENT
Start: 2024-09-20

## 2024-11-20 ENCOUNTER — HOSPITAL ENCOUNTER (OUTPATIENT)
Dept: LAB | Facility: MEDICAL CENTER | Age: 74
End: 2024-11-20
Attending: NURSE PRACTITIONER
Payer: MEDICARE

## 2024-11-20 DIAGNOSIS — Z51.81 ENCOUNTER FOR MONITORING DIURETIC THERAPY: ICD-10-CM

## 2024-11-20 DIAGNOSIS — I11.9 BENIGN HYPERTENSIVE HEART DISEASE WITHOUT HEART FAILURE: ICD-10-CM

## 2024-11-20 DIAGNOSIS — I51.89 DIASTOLIC DYSFUNCTION: ICD-10-CM

## 2024-11-20 DIAGNOSIS — I48.0 PAROXYSMAL ATRIAL FIBRILLATION (HCC): ICD-10-CM

## 2024-11-20 DIAGNOSIS — Z79.899 ENCOUNTER FOR MONITORING DIURETIC THERAPY: ICD-10-CM

## 2024-11-20 LAB
ANION GAP SERPL CALC-SCNC: 11 MMOL/L (ref 7–16)
BUN SERPL-MCNC: 20 MG/DL (ref 8–22)
CALCIUM SERPL-MCNC: 9.6 MG/DL (ref 8.5–10.5)
CHLORIDE SERPL-SCNC: 102 MMOL/L (ref 96–112)
CO2 SERPL-SCNC: 25 MMOL/L (ref 20–33)
CREAT SERPL-MCNC: 1.12 MG/DL (ref 0.5–1.4)
ERYTHROCYTE [DISTWIDTH] IN BLOOD BY AUTOMATED COUNT: 48.2 FL (ref 35.9–50)
GFR SERPLBLD CREATININE-BSD FMLA CKD-EPI: 69 ML/MIN/1.73 M 2
GLUCOSE SERPL-MCNC: 116 MG/DL (ref 65–99)
HCT VFR BLD AUTO: 43.9 % (ref 42–52)
HGB BLD-MCNC: 14.1 G/DL (ref 14–18)
MCH RBC QN AUTO: 29.1 PG (ref 27–33)
MCHC RBC AUTO-ENTMCNC: 32.1 G/DL (ref 32.3–36.5)
MCV RBC AUTO: 90.7 FL (ref 81.4–97.8)
PLATELET # BLD AUTO: 300 K/UL (ref 164–446)
PMV BLD AUTO: 10.2 FL (ref 9–12.9)
POTASSIUM SERPL-SCNC: 4.3 MMOL/L (ref 3.6–5.5)
RBC # BLD AUTO: 4.84 M/UL (ref 4.7–6.1)
SODIUM SERPL-SCNC: 138 MMOL/L (ref 135–145)
WBC # BLD AUTO: 6.2 K/UL (ref 4.8–10.8)

## 2024-11-20 PROCEDURE — 80048 BASIC METABOLIC PNL TOTAL CA: CPT

## 2024-11-20 PROCEDURE — 85027 COMPLETE CBC AUTOMATED: CPT

## 2024-11-20 PROCEDURE — 36415 COLL VENOUS BLD VENIPUNCTURE: CPT

## 2024-11-21 ENCOUNTER — APPOINTMENT (OUTPATIENT)
Dept: CARDIOLOGY | Facility: MEDICAL CENTER | Age: 74
End: 2024-11-21
Attending: INTERNAL MEDICINE
Payer: COMMERCIAL

## 2024-11-22 PROBLEM — M79.671 PAIN IN RIGHT FOOT: Status: ACTIVE | Noted: 2024-11-22

## 2024-11-22 PROBLEM — M25.571 PAIN IN RIGHT ANKLE: Status: ACTIVE | Noted: 2024-11-22

## 2024-12-08 DIAGNOSIS — I11.9 BENIGN HYPERTENSIVE HEART DISEASE WITHOUT HEART FAILURE: ICD-10-CM

## 2024-12-08 DIAGNOSIS — I48.0 PAROXYSMAL ATRIAL FIBRILLATION (HCC): ICD-10-CM

## 2024-12-08 DIAGNOSIS — I51.89 DIASTOLIC DYSFUNCTION: ICD-10-CM

## 2024-12-10 RX ORDER — VALSARTAN 160 MG/1
160 TABLET ORAL 2 TIMES DAILY
Qty: 180 TABLET | Refills: 0 | Status: SHIPPED | OUTPATIENT
Start: 2024-12-10 | End: 2024-12-27

## 2024-12-10 NOTE — TELEPHONE ENCOUNTER
Is the patient due for a refill? Yes    Was the patient seen the past year? No    Date of last office visit: 11/10/2023    Does the patient have an upcoming appointment?  Yes   If yes, When? 12/12/2024    Provider to refill:RT    Does the patient have MCC Plus and need 100-day supply? (This applies to ALL medications) Patient does not have SCP

## 2024-12-11 DIAGNOSIS — I34.0 NONRHEUMATIC MITRAL VALVE REGURGITATION: ICD-10-CM

## 2024-12-11 DIAGNOSIS — I51.89 DIASTOLIC DYSFUNCTION: ICD-10-CM

## 2024-12-11 DIAGNOSIS — I48.0 PAROXYSMAL ATRIAL FIBRILLATION (HCC): ICD-10-CM

## 2024-12-11 DIAGNOSIS — I11.9 BENIGN HYPERTENSIVE HEART DISEASE WITHOUT HEART FAILURE: ICD-10-CM

## 2024-12-12 ENCOUNTER — OFFICE VISIT (OUTPATIENT)
Dept: CARDIOLOGY | Facility: MEDICAL CENTER | Age: 74
End: 2024-12-12
Attending: INTERNAL MEDICINE
Payer: COMMERCIAL

## 2024-12-12 VITALS
HEIGHT: 71 IN | WEIGHT: 249 LBS | HEART RATE: 77 BPM | SYSTOLIC BLOOD PRESSURE: 122 MMHG | OXYGEN SATURATION: 95 % | BODY MASS INDEX: 34.86 KG/M2 | DIASTOLIC BLOOD PRESSURE: 78 MMHG

## 2024-12-12 DIAGNOSIS — I48.0 PAROXYSMAL ATRIAL FIBRILLATION (HCC): ICD-10-CM

## 2024-12-12 DIAGNOSIS — I34.0 NONRHEUMATIC MITRAL VALVE REGURGITATION: ICD-10-CM

## 2024-12-12 DIAGNOSIS — Z79.01 CHRONIC ANTICOAGULATION: Chronic | ICD-10-CM

## 2024-12-12 PROCEDURE — 99212 OFFICE O/P EST SF 10 MIN: CPT | Performed by: INTERNAL MEDICINE

## 2024-12-12 PROCEDURE — 99215 OFFICE O/P EST HI 40 MIN: CPT | Performed by: INTERNAL MEDICINE

## 2024-12-12 PROCEDURE — 99213 OFFICE O/P EST LOW 20 MIN: CPT | Performed by: INTERNAL MEDICINE

## 2024-12-12 ASSESSMENT — FIBROSIS 4 INDEX: FIB4 SCORE: 0.77

## 2024-12-12 NOTE — LETTER
"     [facility]   [address], [address]  [place] [address]  Phone: [phone]  Fax: [phone]              Warren Templeton  1950    Encounter Date: 12/12/2024    Dr. Porter,     I had the pleasure of seeing Warren Templeton today in cardiology clinic. I've attached my visit note below. If you have any questions please feel free to give me a call anytime.      Ayush Ya MD, FACC, [facility]  Interventional Cardiology  [facility]                                                                PROGRESS NOTE:  Chief Complaint   Patient presents with    Atrial Fibrillation       Subjective:   Warren Templeton is a 74 y.o. male who presents today for follow-up of atrial fibrillation status post ablation with PVI by Dr. Reddy 2/12/2018 currently managed with oral anticoagulation in the form of Eliquis that he is tolerating well and antiarrhythmic therapy with dofetilide also tolerated well.  Has not had any recurrence of symptomatic atrial fibrillation since his ablation and was maintained on dofetilide prior to and subsequently with recent discontinuation.     Doing well since last visit.  Planning to undergo a surgical procedure.  No exertional symptoms.    Past Medical History:   Diagnosis Date    Anesthesia 11/16/2022    \"light weight\"    Arthritis 11/16/2022    all over    Benign hypertensive heart disease without heart failure     Disabling from his career as a , occupation related.     Bowel habit changes 11/16/2022    fecal incontinence at times    Cataract 11/16/2022    IOLOU    Chest pain, unspecified     [facility]    Chronic anticoagulation 11/11/2016    Claustrophobia 11/16/2022    severe claustrophobia    Cold 01/14/2018    Dental disorder 11/16/2022    upper front three teeth capped    Diastolic " dysfunction     Dizziness 11/16/2022    history of vertigo, and still has problems with dizziness    Head ache     Headache, chronic daily     Heart burn 11/16/2022    medicated    Hemorrhagic disorder (HCC) 11/16/2022    Eliquis    Hypertension 11/16/2022    medicated    Indigestion 11/16/2022    medicated    MR (mitral regurgitation)     Osteoarthritis     Pain 11/16/2022    low back pain, with right leg involvement to toes, with numbness, burning    Paroxysmal atrial fibrillation (HCC) 11/16/2022    had ablation 2019, on Eliquis    Pure hypercholesterolemia     [facility]    RLS (restless legs syndrome)     Sleep apnea 11/16/2022    uses a CPAP    Urinary incontinence 11/16/2022    at times     Past Surgical History:   Procedure Laterality Date    LUMBAR FUSION O-ARM N/A 12/5/2022    Procedure: POSTERIOR L1-4 DISCECTOMY INTERBODY GRAFT, BILATERAL PEDICLE SCREWS L1-4 WITH OARM;  Surgeon: Johnny Acuna M.D.;  Location: SURGERY [facility];  Service: Neurosurgery    LUMBAR LAMINECTOMY DISKECTOMY N/A 12/5/2022    Procedure: LAMINECTOMY, SPINE, LUMBAR, WITH DISCECTOMY;  Surgeon: Johnny Acuna M.D.;  Location: SURGERY [facility];  Service: Neurosurgery    CATARACT EXTRACTION WITH IOL  2020    CERVICAL DISK AND FUSION ANTERIOR      2 level    GASTRIC BYPASS LAPAROSCOPIC      With Daron-en-Y    KNEE ARTHROPLASTY TOTAL Bilateral     OTHER ORTHOPEDIC SURGERY  2007 note;    Bilateral, with musculoskelatal limitations     Family History   Problem Relation Age of Onset    Hypertension Mother     Cancer Mother         uterine, leukemia, skin    Cancer Father         Esophageal    Cancer Brother         Brain    Hypertension Brother      Social History     Socioeconomic History    Marital status:      Spouse name: Not on file    Number of children: Not on file    Years of education: Not on file    Highest education level: Not on file   Occupational History    Not on file   Tobacco Use    Smoking status:  Former     Types: Cigars     Quit date:      Years since quittin.9    Smokeless tobacco: Never   Vaping Use    Vaping status: Never Used   Substance and Sexual Activity    Alcohol use: Yes     Alcohol/week: 1.2 oz     Types: 2 Cans of beer per week     Comment: 2-3X WEEK    Drug use: No     Comment: CBD gummies, hasn't had times 1-2 months    Sexual activity: Yes     Partners: Female     Comment: Ashlee    Other Topics Concern    Not on file   Social History Narrative    Not on file     Social Drivers of Health     Financial Resource Strain: Not on file   Food Insecurity: Not on file   Transportation Needs: Not on file   Physical Activity: Not on file   Stress: Not on file   Social Connections: Not on file   Intimate Partner Violence: Not on file   Housing Stability: Not on file     Allergies   Allergen Reactions    Magnesium Oxide Rash     Sores on scalp, rash on head&body, itchy anus     Outpatient Encounter Medications as of 2024   Medication Sig Dispense Refill    valsartan (DIOVAN) 160 MG Tab TAKE 1 TABLET BY MOUTH TWICE DAILY 180 Tablet 0    JARDIANCE 10 MG Tab tablet Take 10 mg by mouth every day.      apixaban (ELIQUIS) 5mg Tab Take 1 Tablet by mouth 2 times a day. 180 Tablet 0    potassium chloride SA (K-DUR) 10 MEQ Tab CR Take 2 Tablets by mouth every day. 180 Tablet 0    furosemide (LASIX) 20 MG Tab Take 1 Tablet by mouth every day. 90 Tablet 0    amLODIPine (NORVASC) 10 MG Tab Take 1 Tablet by mouth every day. 90 Tablet 3    metoprolol SR (TOPROL XL) 50 MG TABLET SR 24 HR Take 1 Tablet by mouth 2 times a day. 180 Tablet 3    acetaminophen (TYLENOL) 500 MG Tab Take 2 Tablets by mouth every 6 hours as needed for Mild Pain or Moderate Pain.      baclofen (LIORESAL) 10 MG Tab Take 1 Tablet by mouth 3 times a day.      meclizine (ANTIVERT) 12.5 MG Tab TAKE 1 TABLET BY MOUTH THREE TIMES DAILY AS NEEDED (Patient taking differently: Take 12.5 mg by mouth 3 times a day as needed.) 30 tablet 1     "fluticasone (FLONASE) 50 MCG/ACT nasal spray Administer 1 spray into each nostril twice daily for 2 weeks, then daily thereafter. (Patient taking differently: Administer 1 Spray into affected nostril(S) as needed.) 16 g 3    fluocinonide (LIDEX) 0.05 % external solution Apply 0.05 Applicators to affected area(s) every day.  3    omeprazole (PRILOSEC) 20 MG CPDR Take 1 Capsule by mouth every 48 hours.      [DISCONTINUED] valsartan (DIOVAN) 160 MG Tab Take 1 Tablet by mouth 2 times a day. 180 Tablet 3     No facility-administered encounter medications on file as of 12/12/2024.     Review of Systems   All other systems reviewed and are negative.       Objective:   /78 (BP Location: Left arm, Patient Position: Sitting, BP Cuff Size: Adult)   Pulse 77   Ht 1.803 m (5' 11\")   Wt 113 kg (249 lb)   SpO2 95%   BMI 34.73 kg/m²     Physical Exam  Vitals reviewed.   Constitutional:       General: He is not in acute distress.     Appearance: He is well-developed. He is not diaphoretic.      Comments: Obese   HENT:      Head: Normocephalic and atraumatic.      Right Ear: External ear normal.      Left Ear: External ear normal.   Eyes:      General: No scleral icterus.        Right eye: No discharge.         Left eye: No discharge.      Conjunctiva/sclera: Conjunctivae normal.      Pupils: Pupils are equal, round, and reactive to light.   Neck:      Thyroid: No thyromegaly.      Vascular: No JVD.      Trachea: No tracheal deviation.   Cardiovascular:      Rate and Rhythm: Normal rate and regular rhythm. Occasional Extrasystoles are present.     Chest Wall: PMI is not displaced.      Pulses:           Carotid pulses are 2+ on the right side and 2+ on the left side.       Radial pulses are 2+ on the left side.        Popliteal pulses are 2+ on the right side and 2+ on the left side.        Dorsalis pedis pulses are 2+ on the right side and 2+ on the left side.        Posterior tibial pulses are 2+ on the right side and 2+ " on the left side.      Heart sounds: No murmur heard.     No friction rub. No gallop.   Pulmonary:      Effort: Pulmonary effort is normal. No respiratory distress.      Breath sounds: Normal breath sounds. No wheezing or rales.   Chest:      Chest wall: No tenderness.   Abdominal:      General: Bowel sounds are normal. There is no distension.      Palpations: Abdomen is soft.      Tenderness: There is no abdominal tenderness.   Musculoskeletal:         General: No swelling, tenderness or deformity. Normal range of motion.      Cervical back: Normal range of motion and neck supple.   Skin:     General: Skin is warm and dry.      Coloration: Skin is not pale.      Findings: No erythema or rash.   Neurological:      Mental Status: He is alert and oriented to person, place, and time.      Cranial Nerves: No cranial nerve deficit (cranial nerves II through XII grossly intact).      Coordination: Coordination normal.   Psychiatric:         Behavior: Behavior normal.         Thought Content: Thought content normal.     No significant change in since prior evaluation 12/12/2019    LABS:  Lab Results   Component Value Date/Time    CHOLSTRLTOT 168 10/17/2023 12:18 PM     (H) 10/17/2023 12:18 PM    HDL 39 (A) 10/17/2023 12:18 PM    TRIGLYCERIDE 100 10/17/2023 12:18 PM       Lab Results   Component Value Date/Time    WBC 6.2 11/20/2024 11:02 AM    RBC 4.84 11/20/2024 11:02 AM    HEMOGLOBIN 14.1 11/20/2024 11:02 AM    HEMATOCRIT 43.9 11/20/2024 11:02 AM    MCV 90.7 11/20/2024 11:02 AM    NEUTSPOLYS 52.40 07/14/2023 03:26 PM    LYMPHOCYTES 34.50 07/14/2023 03:26 PM    MONOCYTES 9.70 07/14/2023 03:26 PM    EOSINOPHILS 2.30 07/14/2023 03:26 PM    BASOPHILS 0.70 07/14/2023 03:26 PM     Lab Results   Component Value Date/Time    SODIUM 138 11/20/2024 11:02 AM    POTASSIUM 4.3 11/20/2024 11:02 AM    CHLORIDE 102 11/20/2024 11:02 AM    CO2 25 11/20/2024 11:02 AM    GLUCOSE 116 (H) 11/20/2024 11:02 AM    BUN 20 11/20/2024 11:02  "AM    CREATININE 1.12 11/20/2024 11:02 AM     Lab Results   Component Value Date    HBA1C 6.3 (H) 07/14/2023      Lab Results   Component Value Date/Time    ALKPHOSPHAT 70 07/14/2023 03:26 PM    ASTSGOT 12 07/14/2023 03:26 PM    ALTSGPT 14 07/14/2023 03:26 PM    TBILIRUBIN 0.4 07/14/2023 03:26 PM      No results found for: \"BNPBTYPENAT\"   No results found for: \"TSH\"  Lab Results   Component Value Date/Time    PROTHROMBTM 14.2 12/05/2022 07:35 AM    INR 1.11 12/05/2022 07:35 AM       Imaging reviewed  Assessment:     1. Paroxysmal atrial fibrillation (HCC)        2. Chronic anticoagulation  Basic Metabolic Panel      3. Nonrheumatic mitral valve regurgitation            Medical Decision Making:  Today's Assessment / Status / Plan:     Doing well.  Tolerating oral anticoagulation which is high risk medication be monitored laboratory studies ordered today.  Dosing is appropriate low risk for upcoming surgery from a cardiovascular perspective.  Continue other medical therapy.  Low risk to hold oral anticoagulation prior to surgery and resume following without bridging.  Follow-up routinely.    Thank you for this interesting consultation. It was my pleasure to see Warren Templeton today.    Ayush Ya MD, FACC, [facility]  Division of Interventional Cardiology  [facility]          Nicole Porter D.O.  [address] [facility] [address]  Via Fax: [phone]              "

## 2024-12-13 DIAGNOSIS — I11.9 BENIGN HYPERTENSIVE HEART DISEASE WITHOUT HEART FAILURE: ICD-10-CM

## 2024-12-13 DIAGNOSIS — I48.0 PAROXYSMAL ATRIAL FIBRILLATION (HCC): ICD-10-CM

## 2024-12-13 DIAGNOSIS — I51.89 DIASTOLIC DYSFUNCTION: ICD-10-CM

## 2024-12-13 NOTE — PROGRESS NOTES
"Chief Complaint   Patient presents with    Atrial Fibrillation       Subjective:   Warren Templeton is a 74 y.o. male who presents today for follow-up of atrial fibrillation status post ablation with PVI by Dr. Reddy 2/12/2018 currently managed with oral anticoagulation in the form of Eliquis that he is tolerating well and antiarrhythmic therapy with dofetilide also tolerated well.  Has not had any recurrence of symptomatic atrial fibrillation since his ablation and was maintained on dofetilide prior to and subsequently with recent discontinuation.     Doing well since last visit.  Planning to undergo a surgical procedure.  No exertional symptoms.    Past Medical History:   Diagnosis Date    Anesthesia 11/16/2022    \"light weight\"    Arthritis 11/16/2022    all over    Benign hypertensive heart disease without heart failure     Disabling from his career as a , occupation related.     Bowel habit changes 11/16/2022    fecal incontinence at times    Cataract 11/16/2022    IOLOU    Chest pain, unspecified     ~2000 with negative cardiac cath by [facility]    Chronic anticoagulation 11/11/2016    Claustrophobia 11/16/2022    severe claustrophobia    Cold 01/14/2018    Dental disorder 11/16/2022    upper front three teeth capped    Diastolic dysfunction     Dizziness 11/16/2022    history of vertigo, and still has problems with dizziness    Head ache     Headache, chronic daily     Heart burn 11/16/2022    medicated    Hemorrhagic disorder (HCC) 11/16/2022    Eliquis    Hypertension 11/16/2022    medicated    Indigestion 11/16/2022    medicated    MR (mitral regurgitation)     Osteoarthritis     Pain 11/16/2022    low back pain, with right leg involvement to toes, with numbness, burning    Paroxysmal atrial fibrillation (HCC) 11/16/2022    had ablation 2019, on Eliquis    Pure hypercholesterolemia     [facility]    RLS (restless legs syndrome)     Sleep apnea 11/16/2022    uses a CPAP    Urinary " incontinence 2022    at times     Past Surgical History:   Procedure Laterality Date    LUMBAR FUSION O-ARM N/A 2022    Procedure: POSTERIOR L1-4 DISCECTOMY INTERBODY GRAFT, BILATERAL PEDICLE SCREWS L1-4 WITH OARM;  Surgeon: Johnny Acuna M.D.;  Location: SURGERY [facility];  Service: Neurosurgery    LUMBAR LAMINECTOMY DISKECTOMY N/A 2022    Procedure: LAMINECTOMY, SPINE, LUMBAR, WITH DISCECTOMY;  Surgeon: Johnny Acuna M.D.;  Location: SURGERY [facility];  Service: Neurosurgery    CATARACT EXTRACTION WITH IOL  2020    CERVICAL DISK AND FUSION ANTERIOR      2 level    GASTRIC BYPASS LAPAROSCOPIC      With Daron-en-Y    KNEE ARTHROPLASTY TOTAL Bilateral     OTHER ORTHOPEDIC SURGERY   note;    Bilateral, with musculoskelatal limitations     Family History   Problem Relation Age of Onset    Hypertension Mother     Cancer Mother         uterine, leukemia, skin    Cancer Father         Esophageal    Cancer Brother         Brain    Hypertension Brother      Social History     Socioeconomic History    Marital status:      Spouse name: Not on file    Number of children: Not on file    Years of education: Not on file    Highest education level: Not on file   Occupational History    Not on file   Tobacco Use    Smoking status: Former     Types: Cigars     Quit date:      Years since quittin.9    Smokeless tobacco: Never   Vaping Use    Vaping status: Never Used   Substance and Sexual Activity    Alcohol use: Yes     Alcohol/week: 1.2 oz     Types: 2 Cans of beer per week     Comment: 2-3X WEEK    Drug use: No     Comment: CBD gummies, hasn't had times 1-2 months    Sexual activity: Yes     Partners: Female     Comment: Ashlee    Other Topics Concern    Not on file   Social History Narrative    Not on file     Social Drivers of Health     Financial Resource Strain: Not on file   Food Insecurity: Not on file   Transportation Needs: Not on file   Physical Activity: Not on file    Stress: Not on file   Social Connections: Not on file   Intimate Partner Violence: Not on file   Housing Stability: Not on file     Allergies   Allergen Reactions    Magnesium Oxide Rash     Sores on scalp, rash on head&body, itchy anus     Outpatient Encounter Medications as of 12/12/2024   Medication Sig Dispense Refill    valsartan (DIOVAN) 160 MG Tab TAKE 1 TABLET BY MOUTH TWICE DAILY 180 Tablet 0    JARDIANCE 10 MG Tab tablet Take 10 mg by mouth every day.      apixaban (ELIQUIS) 5mg Tab Take 1 Tablet by mouth 2 times a day. 180 Tablet 0    potassium chloride SA (K-DUR) 10 MEQ Tab CR Take 2 Tablets by mouth every day. 180 Tablet 0    furosemide (LASIX) 20 MG Tab Take 1 Tablet by mouth every day. 90 Tablet 0    amLODIPine (NORVASC) 10 MG Tab Take 1 Tablet by mouth every day. 90 Tablet 3    metoprolol SR (TOPROL XL) 50 MG TABLET SR 24 HR Take 1 Tablet by mouth 2 times a day. 180 Tablet 3    acetaminophen (TYLENOL) 500 MG Tab Take 2 Tablets by mouth every 6 hours as needed for Mild Pain or Moderate Pain.      baclofen (LIORESAL) 10 MG Tab Take 1 Tablet by mouth 3 times a day.      meclizine (ANTIVERT) 12.5 MG Tab TAKE 1 TABLET BY MOUTH THREE TIMES DAILY AS NEEDED (Patient taking differently: Take 12.5 mg by mouth 3 times a day as needed.) 30 tablet 1    fluticasone (FLONASE) 50 MCG/ACT nasal spray Administer 1 spray into each nostril twice daily for 2 weeks, then daily thereafter. (Patient taking differently: Administer 1 Spray into affected nostril(S) as needed.) 16 g 3    fluocinonide (LIDEX) 0.05 % external solution Apply 0.05 Applicators to affected area(s) every day.  3    omeprazole (PRILOSEC) 20 MG CPDR Take 1 Capsule by mouth every 48 hours.      [DISCONTINUED] valsartan (DIOVAN) 160 MG Tab Take 1 Tablet by mouth 2 times a day. 180 Tablet 3     No facility-administered encounter medications on file as of 12/12/2024.     Review of Systems   All other systems reviewed and are negative.       Objective:  "  /78 (BP Location: Left arm, Patient Position: Sitting, BP Cuff Size: Adult)   Pulse 77   Ht 1.803 m (5' 11\")   Wt 113 kg (249 lb)   SpO2 95%   BMI 34.73 kg/m²     Physical Exam  Vitals reviewed.   Constitutional:       General: He is not in acute distress.     Appearance: He is well-developed. He is not diaphoretic.      Comments: Obese   HENT:      Head: Normocephalic and atraumatic.      Right Ear: External ear normal.      Left Ear: External ear normal.   Eyes:      General: No scleral icterus.        Right eye: No discharge.         Left eye: No discharge.      Conjunctiva/sclera: Conjunctivae normal.      Pupils: Pupils are equal, round, and reactive to light.   Neck:      Thyroid: No thyromegaly.      Vascular: No JVD.      Trachea: No tracheal deviation.   Cardiovascular:      Rate and Rhythm: Normal rate and regular rhythm. Occasional Extrasystoles are present.     Chest Wall: PMI is not displaced.      Pulses:           Carotid pulses are 2+ on the right side and 2+ on the left side.       Radial pulses are 2+ on the left side.        Popliteal pulses are 2+ on the right side and 2+ on the left side.        Dorsalis pedis pulses are 2+ on the right side and 2+ on the left side.        Posterior tibial pulses are 2+ on the right side and 2+ on the left side.      Heart sounds: No murmur heard.     No friction rub. No gallop.   Pulmonary:      Effort: Pulmonary effort is normal. No respiratory distress.      Breath sounds: Normal breath sounds. No wheezing or rales.   Chest:      Chest wall: No tenderness.   Abdominal:      General: Bowel sounds are normal. There is no distension.      Palpations: Abdomen is soft.      Tenderness: There is no abdominal tenderness.   Musculoskeletal:         General: No swelling, tenderness or deformity. Normal range of motion.      Cervical back: Normal range of motion and neck supple.   Skin:     General: Skin is warm and dry.      Coloration: Skin is not pale. " "     Findings: No erythema or rash.   Neurological:      Mental Status: He is alert and oriented to person, place, and time.      Cranial Nerves: No cranial nerve deficit (cranial nerves II through XII grossly intact).      Coordination: Coordination normal.   Psychiatric:         Behavior: Behavior normal.         Thought Content: Thought content normal.     No significant change in since prior evaluation 12/12/2019    LABS:  Lab Results   Component Value Date/Time    CHOLSTRLTOT 168 10/17/2023 12:18 PM     (H) 10/17/2023 12:18 PM    HDL 39 (A) 10/17/2023 12:18 PM    TRIGLYCERIDE 100 10/17/2023 12:18 PM       Lab Results   Component Value Date/Time    WBC 6.2 11/20/2024 11:02 AM    RBC 4.84 11/20/2024 11:02 AM    HEMOGLOBIN 14.1 11/20/2024 11:02 AM    HEMATOCRIT 43.9 11/20/2024 11:02 AM    MCV 90.7 11/20/2024 11:02 AM    NEUTSPOLYS 52.40 07/14/2023 03:26 PM    LYMPHOCYTES 34.50 07/14/2023 03:26 PM    MONOCYTES 9.70 07/14/2023 03:26 PM    EOSINOPHILS 2.30 07/14/2023 03:26 PM    BASOPHILS 0.70 07/14/2023 03:26 PM     Lab Results   Component Value Date/Time    SODIUM 138 11/20/2024 11:02 AM    POTASSIUM 4.3 11/20/2024 11:02 AM    CHLORIDE 102 11/20/2024 11:02 AM    CO2 25 11/20/2024 11:02 AM    GLUCOSE 116 (H) 11/20/2024 11:02 AM    BUN 20 11/20/2024 11:02 AM    CREATININE 1.12 11/20/2024 11:02 AM     Lab Results   Component Value Date    HBA1C 6.3 (H) 07/14/2023      Lab Results   Component Value Date/Time    ALKPHOSPHAT 70 07/14/2023 03:26 PM    ASTSGOT 12 07/14/2023 03:26 PM    ALTSGPT 14 07/14/2023 03:26 PM    TBILIRUBIN 0.4 07/14/2023 03:26 PM      No results found for: \"BNPBTYPENAT\"   No results found for: \"TSH\"  Lab Results   Component Value Date/Time    PROTHROMBTM 14.2 12/05/2022 07:35 AM    INR 1.11 12/05/2022 07:35 AM       Imaging reviewed  Assessment:     1. Paroxysmal atrial fibrillation (HCC)        2. Chronic anticoagulation  Basic Metabolic Panel      3. Nonrheumatic mitral valve regurgitation  "           Medical Decision Making:  Today's Assessment / Status / Plan:     Doing well.  Tolerating oral anticoagulation which is high risk medication be monitored laboratory studies ordered today.  Dosing is appropriate low risk for upcoming surgery from a cardiovascular perspective.  Continue other medical therapy.  Low risk to hold oral anticoagulation prior to surgery and resume following without bridging.  Follow-up routinely.    Thank you for this interesting consultation. It was my pleasure to see Warren Templeton today.    Ayush Ya MD, FACC, [facility]  Division of Interventional Cardiology  [facility]    Mr. Templeton's care is highly complex due to high risk diagnosis with either severe exacerbation, progression, or side effects of treatment and is at high risk for complication, morbidity, and mortality. We specifically discussed the need for high risk medication requiring at least quarterly testing and/or made a decision on elective/emergent major surgery with identified patient or procedure risk factors specific to Mr. Templeton. I have personally spent extra time in discussion about these facts with Mr. Templeton and reviewed and or ordered at least 3 tests, documents or other physician/[facility] reports available including labs, imaging and EKGs as appropriate separate from today's encounter.  When relevant, I have reviewed images with Mr. Templeton and personally interpreted on this encounter day the referenced EKG, echocardiogram, stress tests, CT scan, cardiac catheterization or other cardiac imaging and my personal interpretation is what is specifically stated in this note.

## 2024-12-16 RX ORDER — METOPROLOL SUCCINATE 50 MG/1
50 TABLET, EXTENDED RELEASE ORAL 2 TIMES DAILY
Qty: 180 TABLET | Refills: 3 | Status: SHIPPED | OUTPATIENT
Start: 2024-12-16 | End: 2024-12-23 | Stop reason: SDUPTHER

## 2024-12-16 RX ORDER — FUROSEMIDE 20 MG/1
20 TABLET ORAL
Qty: 90 TABLET | Refills: 2 | Status: SHIPPED | OUTPATIENT
Start: 2024-12-16 | End: 2024-12-23 | Stop reason: SDUPTHER

## 2024-12-16 RX ORDER — POTASSIUM CHLORIDE 750 MG/1
20 TABLET, EXTENDED RELEASE ORAL
Qty: 180 TABLET | Refills: 3 | Status: SHIPPED | OUTPATIENT
Start: 2024-12-16

## 2024-12-16 RX ORDER — AMLODIPINE BESYLATE 10 MG/1
10 TABLET ORAL
Qty: 90 TABLET | Refills: 3 | Status: SHIPPED | OUTPATIENT
Start: 2024-12-16 | End: 2024-12-23 | Stop reason: SDUPTHER

## 2024-12-16 NOTE — TELEPHONE ENCOUNTER
Is the patient due for a refill? Yes    Was the patient seen the past year? Yes    Date of last office visit: 12/12/2024    Does the patient have an upcoming appointment?  Yes   If yes, When? 12/12/2025    Provider to refill:TW    Does the patient have half-way Plus and need 100-day supply? (This applies to ALL medications) Patient does not have SCP

## 2024-12-18 ENCOUNTER — TELEPHONE (OUTPATIENT)
Dept: CARDIOLOGY | Facility: MEDICAL CENTER | Age: 74
End: 2024-12-18
Payer: MEDICARE

## 2024-12-18 DIAGNOSIS — I11.9 BENIGN HYPERTENSIVE HEART DISEASE WITHOUT HEART FAILURE: ICD-10-CM

## 2024-12-18 DIAGNOSIS — I34.0 NONRHEUMATIC MITRAL VALVE REGURGITATION: ICD-10-CM

## 2024-12-18 DIAGNOSIS — I51.89 DIASTOLIC DYSFUNCTION: ICD-10-CM

## 2024-12-18 DIAGNOSIS — I48.0 PAROXYSMAL ATRIAL FIBRILLATION (HCC): ICD-10-CM

## 2024-12-19 RX ORDER — VALSARTAN 160 MG/1
160 TABLET ORAL 2 TIMES DAILY
Qty: 180 TABLET | Refills: 0 | OUTPATIENT
Start: 2024-12-19

## 2024-12-19 RX ORDER — METOPROLOL SUCCINATE 50 MG/1
50 TABLET, EXTENDED RELEASE ORAL 2 TIMES DAILY
Qty: 180 TABLET | Refills: 3 | OUTPATIENT
Start: 2024-12-19

## 2024-12-19 RX ORDER — FUROSEMIDE 20 MG/1
20 TABLET ORAL
Qty: 90 TABLET | Refills: 2 | OUTPATIENT
Start: 2024-12-19

## 2024-12-19 RX ORDER — AMLODIPINE BESYLATE 10 MG/1
10 TABLET ORAL
Qty: 90 TABLET | Refills: 3 | OUTPATIENT
Start: 2024-12-19

## 2024-12-19 RX ORDER — POTASSIUM CHLORIDE 750 MG/1
20 TABLET, EXTENDED RELEASE ORAL
Qty: 180 TABLET | Refills: 3 | OUTPATIENT
Start: 2024-12-19

## 2024-12-19 NOTE — TELEPHONE ENCOUNTER
All RX's previously refilled, duplicate requests, RX's denied, except Eliquis.    Eliquis refilled to preferred pharmacy.

## 2024-12-23 RX ORDER — METOPROLOL SUCCINATE 50 MG/1
50 TABLET, EXTENDED RELEASE ORAL 2 TIMES DAILY
Qty: 180 TABLET | Refills: 3 | Status: SHIPPED | OUTPATIENT
Start: 2024-12-23

## 2024-12-23 RX ORDER — AMLODIPINE BESYLATE 10 MG/1
10 TABLET ORAL
Qty: 90 TABLET | Refills: 3 | Status: SHIPPED | OUTPATIENT
Start: 2024-12-23

## 2024-12-23 RX ORDER — FUROSEMIDE 20 MG/1
20 TABLET ORAL
Qty: 90 TABLET | Refills: 2 | Status: SHIPPED | OUTPATIENT
Start: 2024-12-23

## 2024-12-23 NOTE — TELEPHONE ENCOUNTER
TW    Caller: Warren eTmpleton    Topic/issue: As of today's date, pharmacy told patient that we refused and declined to fill the following medications.   I explained we did send over these on 12/16/24 to [facility], receipt confirmation received.    Can pharmacy be called to resolve this for patient so he can get his medications.    Metoprolol  Furosemide  Amlodipine    Callback Number: [phone]    Thank you,  Shanita EASTMAN

## 2024-12-23 NOTE — TELEPHONE ENCOUNTER
Called [facility] pharmacy [place] at [phone] for clarification. [facility] ver[facility] did not rcv metoprolol, furosemide, amlodipine. Resent refills.     Called pt and advised of same.

## 2024-12-25 DIAGNOSIS — I51.89 DIASTOLIC DYSFUNCTION: ICD-10-CM

## 2024-12-25 DIAGNOSIS — I11.9 BENIGN HYPERTENSIVE HEART DISEASE WITHOUT HEART FAILURE: ICD-10-CM

## 2024-12-25 DIAGNOSIS — I48.0 PAROXYSMAL ATRIAL FIBRILLATION (HCC): ICD-10-CM

## 2024-12-27 RX ORDER — VALSARTAN 160 MG/1
160 TABLET ORAL 2 TIMES DAILY
Qty: 180 TABLET | Refills: 3 | Status: SHIPPED | OUTPATIENT
Start: 2024-12-27

## 2024-12-31 ENCOUNTER — TELEPHONE (OUTPATIENT)
Dept: CARDIOLOGY | Facility: MEDICAL CENTER | Age: 74
End: 2024-12-31
Payer: MEDICARE

## 2024-12-31 NOTE — TELEPHONE ENCOUNTER
WINIFRED    Caller: Octavia with [facility]    Topic/issue: Calling in regards to prior authorization submitted for medication:  amLODIPine (NORVASC) 10 MG Tab    Medication is formulary and does not require prior authorization unless there is another reason we submitted the prior auth.    If any additional questions, please call at number listed below.    Callback Number: [phone]    Thank you,  Shanita EASTMAN

## 2024-12-31 NOTE — TELEPHONE ENCOUNTER
TW    Caller: Petra - [facility] Prior Authorizations    Topic/issue: [facility] called for a Prior Authorization for another medication, apixaban (ELIQUIS) 5mg Tab.    Callback Number: [phone]  REF:  [id number]    Thank you,  Sonu POLANCO

## 2024-12-31 NOTE — TELEPHONE ENCOUNTER
Called [phone]  [facility] PAs   Re:  REF:  [id number] (Eliquis) and  Ref # [id number] (KDur)    These medications do not require PA and PA needs to be removed.     To pharmacy coordinators, please assist. Thank you!

## 2024-12-31 NOTE — TELEPHONE ENCOUNTER
Medication is formulary and does not require prior authorization unless there is another reason we submitted the prior auth.     To pharmacy coordinators for assistance. Thank you!

## 2024-12-31 NOTE — TELEPHONE ENCOUNTER
TW          Caller: IRENE WITH PRIOR AUTH DEPT      Topic/issue: Their office was calling about a prior auth that was sent for the metoprolol medication and that a prior auth wasn't needed for it and the patient can pick it up at their pharmacy.        Callback Number: see below    Thank you    -Maximus AVALOS

## 2024-12-31 NOTE — TELEPHONE ENCOUNTER
Pharmacy coordinators, can you assist with the PAs that were submitted for this pt's refills. Please see all tele encounters dated today. Thank you!

## 2024-12-31 NOTE — TELEPHONE ENCOUNTER
TW    Caller:   April - [employer]    Topic/issue:   Calling regarding pre-authorization for:   potassium chloride SA (K-DUR) 10 MEQ Tab CR [[id number]]     Callback Number:   [phone]  Ref # [id number]    Thank you,   Susana CHAN   no radiation

## 2025-01-29 NOTE — PROGRESS NOTES
48 hour Holter monitor placed, per Felton Pena M.D.  >In clinic hook up, monitor serial # [id number].   rolling walker

## (undated) DEVICE — SET EXTENSION WITH 2 PORTS (48EA/CA) ***PART #2C8610 IS A SUBSTITUTE*****

## (undated) DEVICE — SEALER BIPOLAR 2.3 AQUAMANTYS

## (undated) DEVICE — TUBING CLEARLINK DUO-VENT - C-FLO (48EA/CA)

## (undated) DEVICE — PACK NEURO - (2EA/CA)

## (undated) DEVICE — TOWEL STOP TIMEOUT SAFETY FLAG (40EA/CA)

## (undated) DEVICE — SUTURE 3-0 VICRYL PLUS SH - 8X 18 INCH (12/BX)

## (undated) DEVICE — NEEDLE SPINAL NON-SAFETY 18 GA X 3 IN (25EA/BX)

## (undated) DEVICE — BLANKET WARMING LOWER BODY (10EA/CA)

## (undated) DEVICE — LIGHT SOURCE MIS 12FT

## (undated) DEVICE — SUTURE 1 VICRYL PLUS CT-1 - 18 INCH (12/BX)

## (undated) DEVICE — GLOVE BIOGEL PI INDICATOR SZ 8.0 SURGICAL PF LF -(50/BX 4BX/CA)

## (undated) DEVICE — CHLORAPREP 26 ML APPLICATOR - ORANGE TINT(25/CA)

## (undated) DEVICE — COVER MAYO STAND X-LG - (22EA/CA)

## (undated) DEVICE — CANISTER SUCTION 3000ML MECHANICAL FILTER AUTO SHUTOFF MEDI-VAC NONSTERILE LF DISP  (40EA/CA)

## (undated) DEVICE — LACTATED RINGERS INJ. 500 ML - (24EA/CA)

## (undated) DEVICE — GLOVE PROTEXIS PI MICRO SZ 8.5 (200PR/CA)

## (undated) DEVICE — TUBE CONNECT SUCTION CLEAR 120 X 1/4" (50EA/CA)"

## (undated) DEVICE — ELECTRODE DUAL RETURN W/ CORD - (50/PK)

## (undated) DEVICE — SUCTION INSTRUMENT YANKAUER BULBOUS TIP W/O VENT (50EA/CA)

## (undated) DEVICE — SLEEVE VASO CALF MED - (10PR/CA)

## (undated) DEVICE — NEEDLE, DISP 16G X 1-1/2 BLUNT

## (undated) DEVICE — SODIUM CHL IRRIGATION 0.9% 1000ML (12EA/CA)

## (undated) DEVICE — BONE PRESS SPINAL EDITION HENSLER (10EA/CA)

## (undated) DEVICE — GLOVESZ 8.5 BIOGEL PI MICRO - PF LF (50PR/BX)

## (undated) DEVICE — SUTURE 4-0 MONOCRYL PLUS PS-2 - 27 INCH (36/BX)

## (undated) DEVICE — ARMREST CRADLE FOAM - (2PR/PK 12PR/CA)

## (undated) DEVICE — MIDAS LUBRICATOR DIFFUSER PACK (4EA/CA)

## (undated) DEVICE — GOWN SURGEONS X-LARGE - DISP. (30/CA)

## (undated) DEVICE — DRAPE STRLE REG TOWEL 18X24 - (10/BX 4BX/CA)"

## (undated) DEVICE — HEADREST PRONEVIEW LARGE - (10/CA)

## (undated) DEVICE — INTRAOP NEURO IN OR 1:1 PER 15 MIN

## (undated) DEVICE — BLADE SURGICAL CLIPPER - (50EA/CA)

## (undated) DEVICE — SUTURE 2-0 ETHILON FS - (36/BX) 18 INCH

## (undated) DEVICE — DRAPE PATIENT STERILE FOR USE WITH O OR C ARMS (10EA/BX)

## (undated) DEVICE — PACK JACKSON TABLE KIT W/OUT - HR (6EA/CA)

## (undated) DEVICE — NEEDLE NON SAFETY HYPO 22 GA X 1 1/2 IN (100/BX)

## (undated) DEVICE — BOVIE BLADE COATED &INSULATED - 25/PK

## (undated) DEVICE — TUBING C&T SET FLYING LEADS DRAIN TUBING (10EA/BX)

## (undated) DEVICE — TOOL MR8 14CM MATCH HD SYM-TRI 3MM DIAMETER (1/EA)

## (undated) DEVICE — CONTAINER SPECIMEN BAG OR - STERILE 4 OZ W/LID (100EA/CA)

## (undated) DEVICE — KIT EVACUATER 3 SPRING PVC LF 1/8 DRAIN SIZE (10EA/CA)"

## (undated) DEVICE — SENSOR OXIMETER ADULT SPO2 RD SET (20EA/BX)

## (undated) DEVICE — GLOVE BIOGEL ECLIPSE PF LATEX SIZE 7.5

## (undated) DEVICE — TRAY SKIN SCRUB PVP WET (20EA/CA) PART #DYND70356 DISCONTINUED

## (undated) DEVICE — SUTURE GENERAL

## (undated) DEVICE — BONE MILL BM210

## (undated) DEVICE — LIGHTSOURCE LONG ROUND PHOTONSABER 12FR

## (undated) DEVICE — SYRINGE NON SAFETY 10 CC 20 GA X 1-1/2 IN (100/BX 4BX/CA)

## (undated) DEVICE — LACTATED RINGERS INJ 1000 ML - (14EA/CA 60CA/PF)

## (undated) DEVICE — GLOVE PROTEXIS W/NEU-THERA SZ 8.5 (50PR/BX)

## (undated) DEVICE — SPHERE NAVIGATION STEALTH (5EA/TY 12TY/PK)

## (undated) DEVICE — DRAPE LAPAROTOMY T SHEET - (12EA/CA)

## (undated) DEVICE — FORCEPS IRRIGATING 8 X 1.5MM (5EA/BX)

## (undated) DEVICE — SYRINGE 20 ML LL (50EA/BX 4BX/CA)

## (undated) DEVICE — COVER LIGHT HANDLE ALC PLUS DISP (18EA/BX)

## (undated) DEVICE — TRAY SRGPRP PVP IOD WT PRP - (20/CA)

## (undated) DEVICE — SET LEADWIRE 5 LEAD BEDSIDE DISPOSABLE ECG (1SET OF 5/EA)

## (undated) DEVICE — SPONGE XRAY 8X4 STERL. 12PL - (10EA/TY 80TY/CA)

## (undated) DEVICE — GOWN WARMING STANDARD FLEX - (30/CA)

## (undated) DEVICE — SUTURE 1 VICRYL PLUS CTX - 8 X 18 INCH (12/BX)

## (undated) DEVICE — KIT SURGIFLO W/OUT THROMBIN - (6EA/CA)